# Patient Record
Sex: MALE | Race: WHITE | Employment: FULL TIME | URBAN - METROPOLITAN AREA
[De-identification: names, ages, dates, MRNs, and addresses within clinical notes are randomized per-mention and may not be internally consistent; named-entity substitution may affect disease eponyms.]

---

## 2020-09-26 ENCOUNTER — HOSPITAL ENCOUNTER (INPATIENT)
Age: 20
LOS: 3 days | Discharge: HOME | DRG: 871 | End: 2020-09-29
Attending: INTERNAL MEDICINE | Admitting: STUDENT IN AN ORGANIZED HEALTH CARE EDUCATION/TRAINING PROGRAM

## 2020-09-26 PROBLEM — B95.5 STREPTOCOCCAL BACTEREMIA: Status: ACTIVE | Noted: 2020-09-26

## 2020-09-26 PROBLEM — J02.0 PHARYNGITIS DUE TO STREPTOCOCCUS SPECIES: Status: ACTIVE | Noted: 2020-09-26

## 2020-09-26 PROBLEM — R78.81 BACTEREMIA DUE TO STREPTOCOCCUS PNEUMONIAE: Status: ACTIVE | Noted: 2020-09-26

## 2020-09-26 PROBLEM — R93.89 ABNORMAL CT OF THE CHEST: Status: ACTIVE | Noted: 2020-09-26

## 2020-09-26 PROBLEM — R78.81 STREPTOCOCCAL BACTEREMIA: Status: ACTIVE | Noted: 2020-09-26

## 2020-09-26 PROBLEM — B95.3 BACTEREMIA DUE TO STREPTOCOCCUS PNEUMONIAE: Status: ACTIVE | Noted: 2020-09-26

## 2020-09-26 PROBLEM — E87.6 HYPOKALEMIA: Status: ACTIVE | Noted: 2020-09-26

## 2020-09-26 PROCEDURE — 6360000002 HC RX W HCPCS: Performed by: INTERNAL MEDICINE

## 2020-09-26 PROCEDURE — 6370000000 HC RX 637 (ALT 250 FOR IP): Performed by: INTERNAL MEDICINE

## 2020-09-26 PROCEDURE — 2580000003 HC RX 258: Performed by: INTERNAL MEDICINE

## 2020-09-26 PROCEDURE — 1200000000 HC SEMI PRIVATE

## 2020-09-26 PROCEDURE — 36415 COLL VENOUS BLD VENIPUNCTURE: CPT

## 2020-09-26 PROCEDURE — 99222 1ST HOSP IP/OBS MODERATE 55: CPT | Performed by: INTERNAL MEDICINE

## 2020-09-26 PROCEDURE — 87040 BLOOD CULTURE FOR BACTERIA: CPT

## 2020-09-26 RX ORDER — FLUOXETINE HYDROCHLORIDE 20 MG/1
20 CAPSULE ORAL DAILY
COMMUNITY

## 2020-09-26 RX ORDER — HYDROXYZINE 50 MG/1
50 TABLET, FILM COATED ORAL EVERY 6 HOURS PRN
Status: DISCONTINUED | OUTPATIENT
Start: 2020-09-26 | End: 2020-09-29 | Stop reason: HOSPADM

## 2020-09-26 RX ORDER — OLANZAPINE 5 MG/1
5 TABLET ORAL NIGHTLY
COMMUNITY
End: 2021-01-29 | Stop reason: ALTCHOICE

## 2020-09-26 RX ORDER — SODIUM CHLORIDE 9 MG/ML
INJECTION, SOLUTION INTRAVENOUS CONTINUOUS
Status: DISCONTINUED | OUTPATIENT
Start: 2020-09-26 | End: 2020-09-29 | Stop reason: HOSPADM

## 2020-09-26 RX ORDER — SODIUM CHLORIDE 0.9 % (FLUSH) 0.9 %
10 SYRINGE (ML) INJECTION PRN
Status: DISCONTINUED | OUTPATIENT
Start: 2020-09-26 | End: 2020-09-29 | Stop reason: HOSPADM

## 2020-09-26 RX ORDER — ALBUTEROL SULFATE 2.5 MG/3ML
2.5 SOLUTION RESPIRATORY (INHALATION)
Status: DISCONTINUED | OUTPATIENT
Start: 2020-09-26 | End: 2020-09-29 | Stop reason: HOSPADM

## 2020-09-26 RX ORDER — HYDROXYZINE PAMOATE 50 MG/1
50 CAPSULE ORAL EVERY 6 HOURS PRN
COMMUNITY

## 2020-09-26 RX ORDER — POTASSIUM CHLORIDE 20 MEQ/1
40 TABLET, EXTENDED RELEASE ORAL PRN
Status: DISCONTINUED | OUTPATIENT
Start: 2020-09-26 | End: 2020-09-29 | Stop reason: HOSPADM

## 2020-09-26 RX ORDER — FLUOXETINE HYDROCHLORIDE 20 MG/1
20 CAPSULE ORAL DAILY
Status: DISCONTINUED | OUTPATIENT
Start: 2020-09-26 | End: 2020-09-29 | Stop reason: HOSPADM

## 2020-09-26 RX ORDER — OLANZAPINE 5 MG/1
5 TABLET ORAL NIGHTLY
Status: DISCONTINUED | OUTPATIENT
Start: 2020-09-26 | End: 2020-09-29 | Stop reason: HOSPADM

## 2020-09-26 RX ORDER — NICOTINE 21 MG/24HR
1 PATCH, TRANSDERMAL 24 HOURS TRANSDERMAL DAILY PRN
Status: DISCONTINUED | OUTPATIENT
Start: 2020-09-26 | End: 2020-09-29 | Stop reason: HOSPADM

## 2020-09-26 RX ORDER — PROMETHAZINE HYDROCHLORIDE 12.5 MG/1
12.5 TABLET ORAL EVERY 6 HOURS PRN
Status: DISCONTINUED | OUTPATIENT
Start: 2020-09-26 | End: 2020-09-29 | Stop reason: HOSPADM

## 2020-09-26 RX ORDER — ACETAMINOPHEN 650 MG/1
650 SUPPOSITORY RECTAL EVERY 6 HOURS PRN
Status: DISCONTINUED | OUTPATIENT
Start: 2020-09-26 | End: 2020-09-29 | Stop reason: HOSPADM

## 2020-09-26 RX ORDER — IBUPROFEN 400 MG/1
400 TABLET ORAL EVERY 6 HOURS PRN
COMMUNITY

## 2020-09-26 RX ORDER — IPRATROPIUM BROMIDE AND ALBUTEROL SULFATE 2.5; .5 MG/3ML; MG/3ML
1 SOLUTION RESPIRATORY (INHALATION)
Status: DISCONTINUED | OUTPATIENT
Start: 2020-09-26 | End: 2020-09-27

## 2020-09-26 RX ORDER — POTASSIUM CHLORIDE 7.45 MG/ML
10 INJECTION INTRAVENOUS PRN
Status: DISCONTINUED | OUTPATIENT
Start: 2020-09-26 | End: 2020-09-29 | Stop reason: HOSPADM

## 2020-09-26 RX ORDER — SODIUM CHLORIDE 0.9 % (FLUSH) 0.9 %
10 SYRINGE (ML) INJECTION EVERY 12 HOURS SCHEDULED
Status: DISCONTINUED | OUTPATIENT
Start: 2020-09-26 | End: 2020-09-29 | Stop reason: HOSPADM

## 2020-09-26 RX ORDER — ACETAMINOPHEN 325 MG/1
650 TABLET ORAL EVERY 6 HOURS PRN
Status: DISCONTINUED | OUTPATIENT
Start: 2020-09-26 | End: 2020-09-29 | Stop reason: HOSPADM

## 2020-09-26 RX ORDER — AZITHROMYCIN 250 MG/1
250 TABLET, FILM COATED ORAL DAILY
Status: ON HOLD | COMMUNITY
End: 2020-09-29 | Stop reason: HOSPADM

## 2020-09-26 RX ORDER — ONDANSETRON 2 MG/ML
4 INJECTION INTRAMUSCULAR; INTRAVENOUS EVERY 6 HOURS PRN
Status: DISCONTINUED | OUTPATIENT
Start: 2020-09-26 | End: 2020-09-29 | Stop reason: HOSPADM

## 2020-09-26 RX ORDER — IPRATROPIUM BROMIDE AND ALBUTEROL SULFATE 2.5; .5 MG/3ML; MG/3ML
1 SOLUTION RESPIRATORY (INHALATION) EVERY 4 HOURS PRN
Status: DISCONTINUED | OUTPATIENT
Start: 2020-09-26 | End: 2020-09-29 | Stop reason: HOSPADM

## 2020-09-26 RX ADMIN — OLANZAPINE 5 MG: 5 TABLET, FILM COATED ORAL at 20:15

## 2020-09-26 RX ADMIN — SODIUM CHLORIDE: 9 INJECTION, SOLUTION INTRAVENOUS at 18:30

## 2020-09-26 RX ADMIN — ENOXAPARIN SODIUM 40 MG: 40 INJECTION SUBCUTANEOUS at 20:04

## 2020-09-26 RX ADMIN — GENTAMICIN SULFATE 400 MG: 40 INJECTION, SOLUTION INTRAMUSCULAR; INTRAVENOUS at 20:04

## 2020-09-26 RX ADMIN — CEFTRIAXONE SODIUM 2 G: 2 INJECTION, POWDER, FOR SOLUTION INTRAMUSCULAR; INTRAVENOUS at 23:16

## 2020-09-26 ASSESSMENT — ENCOUNTER SYMPTOMS
BLOOD IN STOOL: 0
ABDOMINAL DISTENTION: 0
SHORTNESS OF BREATH: 0
PHOTOPHOBIA: 0
SORE THROAT: 1
COUGH: 1
VOMITING: 0
DIARRHEA: 1
WHEEZING: 0
NAUSEA: 0

## 2020-09-26 ASSESSMENT — PAIN SCALES - GENERAL
PAINLEVEL_OUTOF10: 0
PAINLEVEL_OUTOF10: 0

## 2020-09-26 NOTE — H&P
[facility]  Office: [address], DO, Shanice Krissy, DO, Nicolasachela Davila, DO, Kayla Singhl Blood, DO, Denisse Brady MD, Keli Billings MD, Darryle So, MD, Alecia Dorantes MD, Carlos Smith MD, Santosh Bettencourt MD, Tacos Goodman MD, Maren Stanley MD, Juan Zambrano MD, Shawn Vanegas, DO, Gio Cason MD, Ally Wilkins MD, Jorge Marquez DO, Dakotah Torres MD,  Lilian Dixon DO, Omar Bueno MD, Shawna Hernandez MD, Bibi Hickey, [facility], [address], CNP, Chip Jett, CNP, Zander Walters, CNS, Patrice Hernandez, CNP, Manish Gomez, CNP, Rene Merino, CNP, Stiven Adams, CNP, Mariano Dhillon, CNP, Iveth Martinez PA-C, Herlinda Barros, DOLLY, Chai Bell, CNP, Francisca Waddell, CNP, Damián Meng, CNP, Tawana Rubinstein, CNP, Basilio Romero, CNP         [facility]   [address] [facility]    HISTORY AND PHYSICAL EXAMINATION            Date:   9/26/2020  Patient name:  Epifanio Key  Date of admission:  9/26/2020  4:14 PM  MRN:   [id number]  Account:  [de-identified]  YOB: 2000  PCP:    No primary care provider on file. Room:   [address] [place], [place] [id number]  Code Status:    No Order    Chief Complaint:     Positive Blood culture    History Obtained From:     patient, electronic medical record    History of Present Illness:      The patient is a 22 y.o. male who is admitted to the hospital for the management of:  Strep viridans positive blood culture    The patient reports his problems began last Saturday  He participated in a fear camp at the [facility]  There had been some yelling and screaming  He developed a sore throat  The patient presented to the [facility] urgent care  He was started on Keflex  His symptoms persisted so he followed up with Annelise Mondragon  He was switched to Zithromax  Blood cultures were drawn and 1 was reportedly positive for Strep viridans  The patient was called back to the hospital  CTA revealed bilateral nodules concerning for septic emboli  ID consultation was obtained  The patient was started on Rocephin and gentamicin    Patient reports that he has been feeling much better  His sore throat has resolved  No sick contacts  Fevers appear to have resolved  No recent dental work  No IV drug abuse    The patient and family requested transfer to [facility]      Past Medical History:     Past Medical History:   Diagnosis Date    Depression     Schizoaffective disorder ([facility] [address]         Past Surgical History:     Past Surgical History:   Procedure Laterality Date    WISDOM TOOTH EXTRACTION      While in high school        Medications Prior to Admission:     Prior to Admission medications    Medication Sig Start Date End Date Taking? Authorizing Provider   FLUoxetine (PROZAC) 20 MG capsule Take 20 mg by mouth daily   Yes Historical Provider, MD   hydrOXYzine (VISTARIL) 50 MG capsule Take 50 mg by mouth every 6 hours as needed for Itching   Yes Historical Provider, MD   azithromycin (ZITHROMAX) 250 MG tablet Take 250 mg by mouth daily   Yes Historical Provider, MD        Allergies:     Patient has no known allergies. Social History:     Tobacco:    reports that he has been smoking. He does not have any smokeless tobacco history on file. Alcohol:      reports previous alcohol use. Drug Use:  has no history on file for drug. Family History:     Family History   Problem Relation Age of Onset    Stroke Mother        Review of Systems:     Positive and Negative as described in HPI. Review of Systems   Constitutional: Positive for fever (Resolving). Negative for activity change, appetite change, chills, diaphoresis and fatigue. HENT: Positive for sore throat (Resolving). Negative for congestion and nosebleeds. Eyes: Negative for photophobia and visual disturbance. Respiratory: Positive for cough (Nonproductive). Negative for shortness of breath and wheezing. Cardiovascular: Negative for chest pain and palpitations.    Gastrointestinal: Positive for diarrhea (Resolving). Negative for abdominal distention, blood in stool, nausea and vomiting. Genitourinary: Negative for flank pain and hematuria. Musculoskeletal: Negative for arthralgias and myalgias. Skin: Negative for rash and wound. Neurological: Negative for dizziness and light-headedness. Physical Exam:   /81   Pulse 95   Temp 98.4 °F (36.9 °C) (Oral)   Resp 16   SpO2 100%   Temp (24hrs), Av.4 °F (36.9 °C), Min:98.4 °F (36.9 °C), Max:98.4 °F (36.9 °C)    No results for input(s): POCGLU in the last 72 hours. No intake or output data in the 24 hours ending 20 1718    Physical Exam  Vitals signs reviewed. Constitutional:       General: He is not in acute distress. Appearance: Normal appearance. He is not diaphoretic. HENT:      Head: Normocephalic. Nose: Nose normal.      Mouth/Throat:      Pharynx: No oropharyngeal exudate or posterior oropharyngeal erythema. Eyes:      General: No scleral icterus. Conjunctiva/sclera: Conjunctivae normal.   Neck:      Musculoskeletal: Neck supple. Trachea: No tracheal deviation. Cardiovascular:      Rate and Rhythm: Normal rate and regular rhythm. Pulmonary:      Effort: Pulmonary effort is normal. No respiratory distress. Breath sounds: Normal breath sounds. No wheezing or rales. Chest:      Chest wall: No tenderness. Abdominal:      General: Bowel sounds are normal. There is no distension. Palpations: Abdomen is soft. Tenderness: There is no abdominal tenderness. Musculoskeletal:         General: No tenderness. Skin:     General: Skin is warm and dry. Neurological:      Mental Status: He is alert and oriented to person, place, and time. Investigations:      Laboratory Testing:  No results found for this or any previous visit (from the past 24 hour(s)). Imaging/Diagnostics:    No results found.     Assessment :      Primary Problem  Principal Problem:    Streptococcal bacteremia: Viridans  Active Problems:    Hypokalemia    Pharyngitis due to Streptococcus species    Abnormal CT of the chest - possible septic emboli   Resolved Problems:    * No resolved hospital problems. *        Plan:     Admit  Antibiotics per Infectious Disease service  Respiratory Therapy and Bronchodilators prn  DVT prophylaxis   May need DANE  Data base in progress   See orders      Consultations:   [facility] TO INFECTIOUS DISEASES     Patient is admitted as inpatient status because of co-morbidities listed above, severity of signs and symptoms as outlined, requirement for current medical therapies and most importantly because of direct risk to patient if care not provided in a hospital setting.     Bereket Lopez DO  9/26/2020  5:18 PM    No PCP on file

## 2020-09-27 ENCOUNTER — APPOINTMENT (OUTPATIENT)
Dept: GENERAL RADIOLOGY | Age: 20
DRG: 871 | End: 2020-09-27
Attending: INTERNAL MEDICINE

## 2020-09-27 PROBLEM — E87.1 HYPONATREMIA: Status: ACTIVE | Noted: 2020-09-27

## 2020-09-27 PROBLEM — R93.89 OPACITY NOTED ON IMAGING STUDY: Status: ACTIVE | Noted: 2020-09-27

## 2020-09-27 LAB
ANION GAP SERPL CALCULATED.3IONS-SCNC: 12 MMOL/L (ref 9–17)
BUN BLDV-MCNC: 12 MG/DL (ref 6–20)
BUN/CREAT BLD: ABNORMAL (ref 9–20)
CALCIUM SERPL-MCNC: 8.8 MG/DL (ref 8.6–10.4)
CHLORIDE BLD-SCNC: 103 MMOL/L (ref 98–107)
CO2: 19 MMOL/L (ref 20–31)
CREAT SERPL-MCNC: 1.09 MG/DL (ref 0.7–1.2)
GENT RANDOM DATE LAST DOSE: NORMAL
GENT RANDOM DOSE AMOUNT: NORMAL
GENT RANDOM DOSE TIME: NORMAL
GENTAMICIN RANDOM: 1.8 UG/ML
GFR AFRICAN AMERICAN: >60 ML/MIN
GFR NON-AFRICAN AMERICAN: >60 ML/MIN
GFR SERPL CREATININE-BSD FRML MDRD: ABNORMAL ML/MIN/{1.73_M2}
GFR SERPL CREATININE-BSD FRML MDRD: ABNORMAL ML/MIN/{1.73_M2}
GLUCOSE BLD-MCNC: 105 MG/DL (ref 70–99)
HCT VFR BLD CALC: 45.5 % (ref 40.7–50.3)
HEMOGLOBIN: 15.1 G/DL (ref 13–17)
MCH RBC QN AUTO: 27.5 PG (ref 25.2–33.5)
MCHC RBC AUTO-ENTMCNC: 33.2 G/DL (ref 28.4–34.8)
MCV RBC AUTO: 82.7 FL (ref 82.6–102.9)
NRBC AUTOMATED: 0 PER 100 WBC
PDW BLD-RTO: 12.3 % (ref 11.8–14.4)
PLATELET # BLD: 195 K/UL (ref 138–453)
PMV BLD AUTO: 10.8 FL (ref 8.1–13.5)
POTASSIUM SERPL-SCNC: 4.4 MMOL/L (ref 3.7–5.3)
RBC # BLD: 5.5 M/UL (ref 4.21–5.77)
SODIUM BLD-SCNC: 134 MMOL/L (ref 135–144)
WBC # BLD: 8 K/UL (ref 4.5–13.5)

## 2020-09-27 PROCEDURE — 71046 X-RAY EXAM CHEST 2 VIEWS: CPT

## 2020-09-27 PROCEDURE — 99232 SBSQ HOSP IP/OBS MODERATE 35: CPT | Performed by: INTERNAL MEDICINE

## 2020-09-27 PROCEDURE — 80170 ASSAY OF GENTAMICIN: CPT

## 2020-09-27 PROCEDURE — 1200000000 HC SEMI PRIVATE

## 2020-09-27 PROCEDURE — 6360000002 HC RX W HCPCS: Performed by: INTERNAL MEDICINE

## 2020-09-27 PROCEDURE — 85027 COMPLETE CBC AUTOMATED: CPT

## 2020-09-27 PROCEDURE — 2580000003 HC RX 258: Performed by: INTERNAL MEDICINE

## 2020-09-27 PROCEDURE — 80048 BASIC METABOLIC PNL TOTAL CA: CPT

## 2020-09-27 PROCEDURE — 6370000000 HC RX 637 (ALT 250 FOR IP): Performed by: INTERNAL MEDICINE

## 2020-09-27 PROCEDURE — 36415 COLL VENOUS BLD VENIPUNCTURE: CPT

## 2020-09-27 RX ORDER — IPRATROPIUM BROMIDE AND ALBUTEROL SULFATE 2.5; .5 MG/3ML; MG/3ML
1 SOLUTION RESPIRATORY (INHALATION) EVERY 4 HOURS PRN
Status: DISCONTINUED | OUTPATIENT
Start: 2020-09-27 | End: 2020-09-29 | Stop reason: HOSPADM

## 2020-09-27 RX ADMIN — OLANZAPINE 5 MG: 5 TABLET, FILM COATED ORAL at 20:21

## 2020-09-27 RX ADMIN — ENOXAPARIN SODIUM 40 MG: 40 INJECTION SUBCUTANEOUS at 08:06

## 2020-09-27 RX ADMIN — FLUOXETINE HYDROCHLORIDE 20 MG: 20 CAPSULE ORAL at 08:06

## 2020-09-27 RX ADMIN — CEFTRIAXONE SODIUM 2 G: 2 INJECTION, POWDER, FOR SOLUTION INTRAMUSCULAR; INTRAVENOUS at 17:41

## 2020-09-27 RX ADMIN — GENTAMICIN SULFATE 400 MG: 40 INJECTION, SOLUTION INTRAMUSCULAR; INTRAVENOUS at 20:18

## 2020-09-27 RX ADMIN — ACETAMINOPHEN 650 MG: 325 TABLET ORAL at 20:18

## 2020-09-27 RX ADMIN — SODIUM CHLORIDE: 9 INJECTION, SOLUTION INTRAVENOUS at 11:42

## 2020-09-27 ASSESSMENT — ENCOUNTER SYMPTOMS
COUGH: 0
SORE THROAT: 0
SHORTNESS OF BREATH: 0
ABDOMINAL DISTENTION: 0
ABDOMINAL PAIN: 0
COLOR CHANGE: 0
TROUBLE SWALLOWING: 0
APNEA: 0
VOMITING: 0
NAUSEA: 0
EYE DISCHARGE: 0

## 2020-09-27 ASSESSMENT — PAIN DESCRIPTION - LOCATION: LOCATION: HEAD

## 2020-09-27 ASSESSMENT — PAIN SCALES - GENERAL: PAINLEVEL_OUTOF10: 2

## 2020-09-27 ASSESSMENT — PAIN DESCRIPTION - PAIN TYPE: TYPE: ACUTE PAIN

## 2020-09-27 NOTE — CONSULTS
[facility] -   Infectious diseases evaluation  admission date 9/26/2020    reason for consultation:   Septic pulm emboli    Impression :   Current:  · Strep bacteremia  · Febrile illness  · Blat pulm  septic emboli    Other:  ·   Discussion / summary of stay / plan of care   · Fever and septic P emboli across the lungs bilat-  · Sore throat but never was pharyngitis on exam  · Illness x 7 PTA  · Echo neg [place]  · DANE ordered  Recommendations   · Keep ceftriaxone and genta/  · DANE pend for today  · COVID neg rapid [place] last 9/22    Infection Control Recommendations   · [place] Precautions    Antimicrobial Stewardship Recommendations   · Simplification of therapy  · Targeted therapy  · Per Kg dosing      Coordination ofOutpatient Care:   · Estimated Length of IV antimicrobials:  · Patient will need Midline / picc Catheter Insertion:   · Patient will need SNF:  · Patient will need outpatient wound care:     History of Present Illness:   Initial history:  Quang Sheffield is a 22y.o.-year-old male admitted to the hospital for management of positive blood culture for strep viridans. He was having some sore throat after yelling and screaming, fever started 9/22 102 max, was given Keflex but did not improve he was switched to Zithromax and blood culture drawn, 1 came back positive for strep viridens - fever contin=ous and throat exam reported as neg.     The patient then was called to the hospital, CT of the chest showed bilateral septic emboli, reviewed films  He was started on ceftriaxone and gentamicin were consulted  In the meantime the sore throat has resolved  No history of drug use      Interval changes  9/28/2020     Summary of relevant labs:  Labs:  WBC 8  Micro:  Blood cultures increased 9/2616 I  [facility]- strep [facility]idans     Imaging:  CT chest Imaging from the other hospital showing septic emboli bilaterally        I have personally reviewed the past medical history, past surgical history, medications, social history, and family history, and I haveupdated the database accordingly.   Past Medical History:     Past Medical History:   Diagnosis Date    Depression     Schizoaffective disorder ([facility] [address]        Past Surgical  History:     Past Surgical History:   Procedure Laterality Date    WISDOM TOOTH EXTRACTION      While in high school       Medications:      gentamicin  400 mg Intravenous Q24H    sodium chloride flush  10 mL Intravenous 2 times per day    enoxaparin  40 mg Subcutaneous Daily    cefTRIAXone (ROCEPHIN) IV  2 g Intravenous Q24H    FLUoxetine  20 mg Oral Daily    OLANZapine  5 mg Oral Nightly    aminoglycoside intermittent dosing (placeholder)   Other RX Placeholder       Social History:     Social History     Socioeconomic History    Marital status: Single     Spouse name: Not on file    Number of children: Not on file    Years of education: Not on file    Highest education level: Not on file   Occupational History    Not on file   Social Needs    Financial resource strain: Not on file    Food insecurity     Worry: Not on file     Inability: Not on file    Transportation needs     Medical: Not on file     Non-medical: Not on file   Tobacco Use    Smoking status: Current Every Day Smoker   Substance and Sexual Activity    Alcohol use: Not Currently    Drug use: Not on file    Sexual activity: Not on file   Lifestyle    Physical activity     Days per week: Not on file     Minutes per session: Not on file    Stress: Not on file   Relationships    Social connections     Talks on phone: Not on file     Gets together: Not on file     Attends Orthodox service: Not on file     Active member of club or organization: Not on file     Attends meetings of clubs or organizations: Not on file     Relationship status: Not on file    Intimate partner violence     Fear of current or ex partner: Not on file Emotionally abused: Not on file     Physically abused: Not on file     Forced sexual activity: Not on file   Other Topics Concern    Not on file   Social History Narrative    Not on file       Family History:     Family History   Problem Relation Age of Onset    Stroke Mother         Allergies:   Patient has no known allergies. Review of Systems:     Review of Systems   Constitutional: Negative for activity change and appetite change. HENT: Positive for sore throat. Negative for congestion. Tinnitus: resolved about 2 days after beginning of the illnes. Eyes: Negative for discharge. Respiratory: Negative for apnea. Cardiovascular: Negative for chest pain. Gastrointestinal: Positive for diarrhea (after AB). Negative for abdominal distention. Endocrine: Negative for heat intolerance. Genitourinary: Negative for dysuria. Musculoskeletal: Negative for arthralgias. Skin: Negative for color change. Allergic/Immunologic: Negative for immunocompromised state. Neurological: Negative for dizziness and weakness. Hematological: Negative for adenopathy. Psychiatric/Behavioral: Negative for agitation. Physical Examination :     Patient Vitals for the past 8 hrs:   BP Temp Temp src Pulse Resp SpO2 Weight   09/28/20 0727 119/75 98.4 °F (36.9 °C) Oral 85 16 94 % --   09/28/20 0645 -- -- -- -- -- -- 198 lb 3.2 oz (89.9 kg)       Physical Exam  Constitutional:       Appearance: Normal appearance. HENT:      Head: Normocephalic and atraumatic. Nose: Nose normal.      Mouth/Throat:      Mouth: Mucous membranes are moist.      Pharynx: Oropharynx is clear. Eyes:      General: No scleral icterus. Conjunctiva/sclera: Conjunctivae normal.      Pupils: Pupils are equal, round, and reactive to light. Neck:      Musculoskeletal: Neck supple. No neck rigidity. Cardiovascular:      Rate and Rhythm: Normal rate and regular rhythm. Heart sounds: Normal heart sounds. No murmur. [phone]

## 2020-09-27 NOTE — PROGRESS NOTES
[facility]    Office: [address], DO, Shanique Garzaer, DO, Farrah Manifold, DO, Gaurav Blanca Blood, DO, Polly Perez MD, Freddy Greene MD, Velvet Fitch MD, Milton Bansal MD, Mark Pack MD, Servando Valdez MD, Chelsi Rowe MD, Celestino Eden MD, Mbradha Hsieh MD, Ronald Og DO, Julia Rodriguez MD, Devon Whatley MD, Daina Danielson DO, Fasial Nelson MD,  Hira Foster DO, Norris Holter, MD, Nicol Avitia MD, Analia Dickerson CNP, [facility] [place], CNP, Jina Gillette CNP, Haresh Christensen, CNS, Arley Howard, CNP, Santi Crawford, CNP, Lauren Grier, CNP, Adair Weaver, CNP, Cristal Berumen, CNP, Pedro Reyes PA-C, Calvin Powers [facility], Pamela Meade, CNP, Lisa Schmidt, CNP, Carey Cox, CNP, Helio Traore, CNP, Yovana Machuca, [address]    Progress Note    9/27/2020    2:54 PM    Name:   Vanessa Lambert  MRN:     [id number]     Acct:      [de-identified]   Room:   [facility] Day:  1  Admit Date:  9/26/2020  4:14 PM    PCP:   No primary care provider on file. Code Status:  Full Code    Subjective:     C/C:   Streptococcal bacteremia  Concern with endocarditis    Interval History Status:  improved  Feels good  No malaise  T-max 99.9    Data Base Updates:  WBC 8.0 k/uL RBC 5.50 m/uL Hemoglobin 15.1     Sodium 134Low     Chest x-ray:  Impression:  Subtle left basilar opacity may represent developing pneumonia. Otherwise   negative chest.     Culture, Blood 1 [[id number]] Collected: 09/26/20 1845 Updated: 09/27/20 0609 Specimen Source: Blood Specimen Description . BLOOD Special Requests LT HAND 10ML Culture NO GROWTH 9 HOURS       Brief History:     The patient is a 22 y.o. male who is admitted to the hospital for the management of:  Strep viridans positive blood culture     The patient reports his problems began last Saturday  He participated in a fear camp [facility] the [facility]  There had been some yelling and screaming  He developed a sore throat  The patient presented to the [address] urgent care  He was started on Keflex  His symptoms persisted so he followed up with Hema Barnes  He was switched to Zithromax  Blood cultures were drawn and 1 was reportedly positive for Strep viridans  The patient was called back to the hospital  CTA revealed bilateral nodules concerning for septic emboli  ID consultation was obtained  The patient was started on Rocephin and gentamicin     Patient reports that he has been feeling much better  His sore throat has resolved  No sick contacts  Fevers appear to have resolved  No recent dental work  No IV drug abuse     The patient and family requested transfer to [facility]    The initial plan included:  Antibiotics per Infectious Disease service  Respiratory Therapy and Bronchodilators prn  DVT prophylaxis   May need DANE    Medications: Allergies:  No Known Allergies    Current Meds:   Scheduled Meds:    gentamicin  400 mg Intravenous Q24H    sodium chloride flush  10 mL Intravenous 2 times per day    enoxaparin  40 mg Subcutaneous Daily    cefTRIAXone (ROCEPHIN) IV  2 g Intravenous Q24H    FLUoxetine  20 mg Oral Daily    OLANZapine  5 mg Oral Nightly    aminoglycoside intermittent dosing (placeholder)   Other RX Placeholder     Continuous Infusions:    sodium chloride 75 mL/hr at 09/27/20 1142     PRN Meds: ipratropium-albuterol, sodium chloride flush, acetaminophen **OR** acetaminophen, magnesium hydroxide, promethazine **OR** ondansetron, nicotine, albuterol, ipratropium-albuterol, hydrOXYzine, potassium chloride **OR** potassium alternative oral replacement **OR** potassium chloride    Data:     Past Medical History:   has a past medical history of Depression and Schizoaffective disorder ([facility] [address] Social History:   reports that he has been smoking. He does not have any smokeless tobacco history on file. He reports previous alcohol use.      Family History:   Family History   Problem Relation Age of Onset    Stroke Mother        Review of Systems:     Review of Systems   Constitutional: Negative for chills and diaphoresis. HENT: Negative for sore throat and trouble swallowing. Respiratory: Negative for cough and shortness of breath. Cardiovascular: Negative for chest pain and palpitations. Gastrointestinal: Negative for abdominal pain, nausea and vomiting. Genitourinary: Negative for flank pain and hematuria. Skin: Negative for rash. Physical Examination:        Physical Exam  Vitals signs reviewed. Constitutional:       General: He is not in acute distress. Appearance: He is not diaphoretic. HENT:      Head: Normocephalic. Nose: Nose normal.   Eyes:      General: No scleral icterus. Conjunctiva/sclera: Conjunctivae normal.   Neck:      Musculoskeletal: Neck supple. Trachea: No tracheal deviation. Cardiovascular:      Rate and Rhythm: Normal rate and regular rhythm. Pulmonary:      Effort: Pulmonary effort is normal. No respiratory distress. Breath sounds: Normal breath sounds. No wheezing or rales. Chest:      Chest wall: No tenderness. Abdominal:      General: Bowel sounds are normal. There is no distension. Palpations: Abdomen is soft. Tenderness: There is no abdominal tenderness. Musculoskeletal:         General: No tenderness. Skin:     General: Skin is warm and dry. Vitals:  /78   Pulse 90   Temp 98.6 °F (37 °C) (Oral)   Resp 18   Ht 5' 7\" (1.702 m) Comment: 5' 7\"  Wt 200 lb 14.4 oz (91.1 kg)   SpO2 94%   BMI 31.47 kg/m²   Temp (24hrs), Av °F (37.2 °C), Min:98.4 °F (36.9 °C), Max:99.9 °F (37.7 °C)    No results for input(s): POCGLU in the last 72 hours. I/O (24Hr):     Intake/Output Summary (Last 24 hours) at 2020 1454  Last data filed at 2020 0650  Gross per 24 hour   Intake 1273.84 ml   Output --   Net 1273.84 ml       Labs:  Hematology:  Recent Labs     20  0609   WBC 8.0 RBC 5.50   HGB 15.1   HCT 45.5   MCV 82.7   MCH 27.5   MCHC 33.2   RDW 12.3      MPV 10.8     Chemistry:  Recent Labs     09/27/20  0609   *   K 4.4      CO2 19*   GLUCOSE 105*   BUN 12   CREATININE 1.09   ANIONGAP 12   LABGLOM >60   GFRAA >60   CALCIUM 8.8   No results for input(s): PROT, LABALBU, LABA1C, I7OAIRA, F8GCXFV, FT4, TSH, AST, ALT, LDH, GGT, ALKPHOS, LABGGT, BILITOT, BILIDIR, AMMONIA, AMYLASE, LIPASE, LACTATE, CHOL, HDL, LDLCHOLESTEROL, CHOLHDLRATIO, TRIG, VLDL, LAB48TB, PHENYTOIN, PHENYF, URICACID, POCGLU in the last 72 hours. ABG:No results found for: POCPH, PHART, PH, POCPCO2, XOF1GJZ, PCO2, POCPO2, PO2ART, PO2, POCHCO3, ZRU4UED, HCO3, NBEA, PBEA, BEART, BE, THGBART, THB, KIQ0DMD, RJAO9ENN, G3QXJRFW, O2SAT, FIO2  Lab Results   Component Value Date/Time    SPECIAL LT HAND 10ML 09/26/2020 06:45 PM     Lab Results   Component Value Date/Time    CULTURE NO GROWTH 9 HOURS 09/26/2020 06:45 PM       Radiology:  Tomas Estrada Chest (2 Vw)    Result Date: 9/27/2020  Subtle left basilar opacity may represent developing pneumonia. Otherwise negative chest.         Assessment:        Principal Problem:    Streptococcal bacteremia: Viridans  Active Problems:    Hypokalemia    Pharyngitis due to Streptococcus species    Abnormal CT of the chest - possible septic emboli     Hyponatremia    Opacity left lower lobe  Resolved Problems:    * No resolved hospital problems.  *      Plan:        Antibiotics per Infectious Disease service  Respiratory Therapy and Bronchodilators prn  DVT prophylaxis   May need DANE  Correct electrolyte abnormalities  Observe for possible left lower lobe pneumonia  Data base in progress   See orders      IP CONSULT TO PHARMACY  IP CONSULT TO INFECTIOUS DISEASES    Shiva Hernandez DO  9/27/2020  2:54 PM

## 2020-09-27 NOTE — PROGRESS NOTES
Pharmacy Aminoglycoside Consult    Aminoglycoside: gentamicin  Day: 2  Current Dosin mg IV q24h    Temp max: 99.9    Estimated Creatinine Clearance: 115 mL/min (based on SCr of 1.09 mg/dL). Estimated CrCl using Ideal Body Weight: 100.23 mL/min (based on IBW 66.1 kg)    Recent Labs     20  0609   CREATININE 1.09       Recent Labs     20  0609   WBC 8.0         Random gentamicin: 1.8    ASSESSMENT/PLAN: continue gentamicin 400 mg IV q24h.      Shavon Lion, PharmD, BCPS  2020  11:51 AM

## 2020-09-27 NOTE — PROGRESS NOTES
Beatrice Novak PPatient Assessment complete. Bacteremia due to Streptococcus pneumoniae [R78.81] . Vitals:    09/26/20 1922   BP: 136/74   Pulse: 94   Resp: 16   Temp: 99.9 °F (37.7 °C)   SpO2: 95%     Assessment    Patient alert and oriented. He currently on room air O2 saturation 97% ,RR18,breathing sounds clear and diminished , patient states that he has history of Asthma. He dose not wear O2 at home or take breathing treatment. No Complication no signs of distress at this time will continue to monitor.   RR 18  Breath Sounds: clear      · Bronchodilator assessment at level  1  · Hyperinflation assessment at level   · Secretion Management assessment at level    ·   · [x]    Bronchodilator Assessment  BRONCHODILATOR ASSESSMENT SCORE  Score 0 1 2 3 4 5   Breath Sounds   []  Patient Baseline [x]  No Wheeze good aeration []  Faint, scattered wheezing, good aeration []  Expiratory Wheezing and or moderately diminished []  Insp/Exp wheeze and/or very diminished []  Insp/Exp and/ or marked distress   Respiratory Rate   []  Patient Baseline [x]  Less than 20 []  Less than 20 []  20-25 []  Greater than 25 []  Greater than 25   Peak flow % of Pred or PB [x]  NA   []  Greater than 90%  []  81-90% []  71-80% []  Less than or equal to 70%  or unable to perform []  Unable due to Respiratory Distress   Dyspnea re []  Patient Baseline [x]  No SOB []  No SOB []  SOB on exertion []  SOB min activity []  At rest/acute   e FEV% Predicted       [x]  NA []  Above 69%  []  Unable []  Above 60-69%  []  Unable []  Above 50-59%  []  Unable []  Above 35-49%  []  Unable []  Less than 35%  []  Unable

## 2020-09-27 NOTE — PLAN OF CARE
Problem: Falls - Risk of:  Goal: Will remain free from falls  Description: Will remain free from falls  9/27/2020 1753 by Urvashi Salmeron RN  Outcome: Ongoing     Problem: Falls - Risk of:  Goal: Absence of physical injury  Description: Absence of physical injury  9/27/2020 1753 by Urvashi Salmeron RN  Outcome: Ongoing     Problem: Daily Care:  Goal: Daily care needs are met  Description: Daily care needs are met  Outcome: Ongoing     Problem: Discharge Planning:  Goal: Patients continuum of care needs are met  Description: Patients continuum of care needs are met  Outcome: Ongoing

## 2020-09-28 ENCOUNTER — APPOINTMENT (OUTPATIENT)
Dept: CARDIAC CATH/INVASIVE PROCEDURES | Age: 20
DRG: 871 | End: 2020-09-28
Attending: INTERNAL MEDICINE

## 2020-09-28 LAB
HIV AG/AB: NONREACTIVE
LV EF: 55 %
LV EF: 58 %
LVEF MODALITY: NORMAL
LVEF MODALITY: NORMAL
SARS-COV-2, RAPID: NOT DETECTED
SARS-COV-2: NORMAL
SARS-COV-2: NORMAL
SOURCE: NORMAL

## 2020-09-28 PROCEDURE — 2580000003 HC RX 258: Performed by: STUDENT IN AN ORGANIZED HEALTH CARE EDUCATION/TRAINING PROGRAM

## 2020-09-28 PROCEDURE — 1200000000 HC SEMI PRIVATE

## 2020-09-28 PROCEDURE — U0002 COVID-19 LAB TEST NON-CDC: HCPCS

## 2020-09-28 PROCEDURE — 6370000000 HC RX 637 (ALT 250 FOR IP): Performed by: STUDENT IN AN ORGANIZED HEALTH CARE EDUCATION/TRAINING PROGRAM

## 2020-09-28 PROCEDURE — 2709999900 HC NON-CHARGEABLE SUPPLY

## 2020-09-28 PROCEDURE — 6360000002 HC RX W HCPCS: Performed by: STUDENT IN AN ORGANIZED HEALTH CARE EDUCATION/TRAINING PROGRAM

## 2020-09-28 PROCEDURE — 99254 IP/OBS CNSLTJ NEW/EST MOD 60: CPT | Performed by: INTERNAL MEDICINE

## 2020-09-28 PROCEDURE — 6360000002 HC RX W HCPCS

## 2020-09-28 PROCEDURE — 99232 SBSQ HOSP IP/OBS MODERATE 35: CPT | Performed by: INTERNAL MEDICINE

## 2020-09-28 PROCEDURE — 36415 COLL VENOUS BLD VENIPUNCTURE: CPT

## 2020-09-28 PROCEDURE — B24BZZ4 ULTRASONOGRAPHY OF HEART WITH AORTA, TRANSESOPHAGEAL: ICD-10-PCS | Performed by: STUDENT IN AN ORGANIZED HEALTH CARE EDUCATION/TRAINING PROGRAM

## 2020-09-28 PROCEDURE — 93312 ECHO TRANSESOPHAGEAL: CPT

## 2020-09-28 PROCEDURE — 87389 HIV-1 AG W/HIV-1&-2 AB AG IA: CPT

## 2020-09-28 PROCEDURE — 93325 DOPPLER ECHO COLOR FLOW MAPG: CPT

## 2020-09-28 RX ORDER — SODIUM CHLORIDE 0.9 % (FLUSH) 0.9 %
10 SYRINGE (ML) INJECTION EVERY 12 HOURS SCHEDULED
Status: DISCONTINUED | OUTPATIENT
Start: 2020-09-28 | End: 2020-09-29 | Stop reason: HOSPADM

## 2020-09-28 RX ORDER — SODIUM CHLORIDE 0.9 % (FLUSH) 0.9 %
10 SYRINGE (ML) INJECTION PRN
Status: DISCONTINUED | OUTPATIENT
Start: 2020-09-28 | End: 2020-09-29 | Stop reason: HOSPADM

## 2020-09-28 RX ADMIN — CEFTRIAXONE SODIUM 2 G: 2 INJECTION, POWDER, FOR SOLUTION INTRAMUSCULAR; INTRAVENOUS at 17:42

## 2020-09-28 RX ADMIN — SODIUM CHLORIDE: 9 INJECTION, SOLUTION INTRAVENOUS at 17:41

## 2020-09-28 RX ADMIN — OLANZAPINE 5 MG: 5 TABLET, FILM COATED ORAL at 21:57

## 2020-09-28 RX ADMIN — FLUOXETINE HYDROCHLORIDE 20 MG: 20 CAPSULE ORAL at 16:28

## 2020-09-28 RX ADMIN — GENTAMICIN SULFATE 400 MG: 40 INJECTION, SOLUTION INTRAMUSCULAR; INTRAVENOUS at 19:49

## 2020-09-28 RX ADMIN — ACETAMINOPHEN 650 MG: 325 TABLET ORAL at 19:49

## 2020-09-28 ASSESSMENT — PAIN SCALES - GENERAL
PAINLEVEL_OUTOF10: 2
PAINLEVEL_OUTOF10: 5

## 2020-09-28 ASSESSMENT — ENCOUNTER SYMPTOMS
NAUSEA: 0
ABDOMINAL PAIN: 0
TROUBLE SWALLOWING: 0
SORE THROAT: 1
SHORTNESS OF BREATH: 0
COUGH: 0
SORE THROAT: 0
VOMITING: 0
DIARRHEA: 1

## 2020-09-28 NOTE — FLOWSHEET NOTE
Assessment: The patient is a 22 y.o. male who is admitted to the hospital for the management of:  Strep viridans positive blood culture. Pt not feeling very well. Intervention:  was rounding on floor.  was bedside support offering prayer and comfort. Outcome: Pt was open to spiritual care. Connection was established. Follow Up: Tricia Aschoff will continue to support as needed during hospital stay.         09/27/20 0742   Encounter Summary   Services provided to: Patient   Referral/Consult From: [address] [place] [address] Family members   Continue Visiting   (9/27/20)   Complexity of Encounter Low   Length of Encounter 15 minutes   Spiritual Assessment Completed Yes   Spiritual/Jew   Type Spiritual support   Assessment Approachable   Intervention Nurtured hope   Outcome Comfort

## 2020-09-28 NOTE — PROCEDURES
[facility]  Transesophageal Echocardiogram       Today's Date:  9/28/2020  Indication:   Rule out endocarditis     Operators:  Primary:   Dr Pari Copeland  (Attending Physician)  Assistant:   Lidia Li MD (Cardiovascular Fellow)      Pre Procedure Conscious Sedation Data:    ASA Class:    [] I [x] II [] III [] IV    Mallampati Class:  [] I [x] II [] III [] IV    Procedure:    Patient seen and examined. History and Physical reviewed. Labs reviewed. After informed consent was obtained with explanation of the risks and benefits, the patient was brought to cardiac cath lab. All sedation was administered by the cardiologist. The oropharynx was pre-anesthesized with viscous lidocaine and cetacaine spray. The ultrasound probe was passed without any difficulty. DANE findings:    LA:  Normal  ASUNCION:  No thrombus  RA:  Normal  RV:   Normal  LV:  Normal  Estimated LVEF:  55%  Aorta:   Mild atheromatous disease arch  Pericardium: No pericardial effusion  Septum:  No intracardiac shunt via color Doppler. Valves:    Mitral Valve: Structurally normal. No regurgitation is identified. Aortic Valve: The aortic valve is trileaflet and opens adequately. No regurgitiation is identified. Tricuspid valve: Structurally normal. No regurgitation is identified. Pulmonary valve: Normal. No significant regurgitation    No valvular vegetations or thrombus identified. Summary:     1. A DANE was performed without complications. 2. LVEF 55 %  3. No thrombus or valvular vegetation identified  4. No intracardiac shunt via color Doppler  5. There were no complications encountered.       Electronically signed by Lidia Li MD on 9/28/2020 at 1:25 PM  Cardiovascular Diseases Fellow  [facility]    I have reviewed the case / procedure with resident / fellow  I have examined the patient personally  Patient agree with treatment plan, correction innotes was made as appropriate, and discussed final arrangement based on  my evaluation and exam.    Risk and benefit of procedure if planned were explained in details. Procedure was performed by me, with all aspect of the procedure being done using standard protocol. Note was modified based on my own assessment and treatment.     Heber Johnson MD  [facility]

## 2020-09-28 NOTE — PROGRESS NOTES
DANE neg for vegetation  elda not solve the problem of the pulm nodules and fever    Asking pulm to eval for diag procedure  Get HIV test    Karyna Florence MD. Infectious Diseases

## 2020-09-28 NOTE — PROGRESS NOTES
reportedly positive for Strep viridans  The patient was called back to the hospital  CTA revealed bilateral nodules concerning for septic emboli  ID consultation was obtained  The patient was started on Rocephin and gentamicin     Patient reports that he has been feeling much better  His sore throat has resolved  No sick contacts  Fevers appear to have resolved  No recent dental work  No IV drug abuse     The patient and family requested transfer to [facility]    The initial plan included:  Antibiotics per Infectious Disease service  Respiratory Therapy and Bronchodilators prn  DVT prophylaxis   May need DANE    Medications: Allergies:  No Known Allergies    Current Meds:   Scheduled Meds:    gentamicin  400 mg Intravenous Q24H    sodium chloride flush  10 mL Intravenous 2 times per day    enoxaparin  40 mg Subcutaneous Daily    cefTRIAXone (ROCEPHIN) IV  2 g Intravenous Q24H    FLUoxetine  20 mg Oral Daily    OLANZapine  5 mg Oral Nightly    aminoglycoside intermittent dosing (placeholder)   Other RX Placeholder     Continuous Infusions:    sodium chloride 75 mL/hr at 09/27/20 1142     PRN Meds: ipratropium-albuterol, sodium chloride flush, acetaminophen **OR** acetaminophen, magnesium hydroxide, promethazine **OR** ondansetron, nicotine, albuterol, ipratropium-albuterol, hydrOXYzine, potassium chloride **OR** potassium alternative oral replacement **OR** potassium chloride    Data:     Past Medical History:   has a past medical history of Depression and Schizoaffective disorder ([address] Social History:   reports that he has been smoking. He does not have any smokeless tobacco history on file. He reports previous alcohol use. Family History:   Family History   Problem Relation Age of Onset    Stroke Mother        Review of Systems:     Review of Systems   Constitutional: Negative for chills and diaphoresis. HENT: Negative for sore throat and trouble swallowing.     Respiratory: Negative for cough and shortness of breath. Cardiovascular: Negative for chest pain and palpitations. Gastrointestinal: Negative for abdominal pain, nausea and vomiting. Genitourinary: Negative for flank pain and hematuria. Skin: Negative for rash. Physical Examination:        Physical Exam  Vitals signs reviewed. Constitutional:       General: He is not in acute distress. Appearance: He is not diaphoretic. HENT:      Head: Normocephalic. Nose: Nose normal.   Eyes:      General: No scleral icterus. Conjunctiva/sclera: Conjunctivae normal.   Neck:      Musculoskeletal: Neck supple. Trachea: No tracheal deviation. Cardiovascular:      Rate and Rhythm: Normal rate and regular rhythm. Pulmonary:      Effort: Pulmonary effort is normal. No respiratory distress. Breath sounds: Normal breath sounds. No wheezing or rales. Chest:      Chest wall: No tenderness. Abdominal:      General: Bowel sounds are normal. There is no distension. Palpations: Abdomen is soft. Tenderness: There is no abdominal tenderness. Musculoskeletal:         General: No tenderness. Skin:     General: Skin is warm and dry. Vitals:  /75   Pulse 85   Temp 98.4 °F (36.9 °C) (Oral)   Resp 16   Ht 5' 7\" (1.702 m) Comment: 5' 7\"  Wt 198 lb 3.2 oz (89.9 kg)   SpO2 94%   BMI 31.04 kg/m²   Temp (24hrs), Av.2 °F (37.3 °C), Min:98.4 °F (36.9 °C), Max:100.6 °F (38.1 °C)    No results for input(s): POCGLU in the last 72 hours. I/O (24Hr):     Intake/Output Summary (Last 24 hours) at 2020 0936  Last data filed at 2020 0645  Gross per 24 hour   Intake 2629.15 ml   Output --   Net 2629.15 ml       Labs:  Hematology:  Recent Labs     20  0609   WBC 8.0   RBC 5.50   HGB 15.1   HCT 45.5   MCV 82.7   MCH 27.5   MCHC 33.2   RDW 12.3      MPV 10.8     Chemistry:  Recent Labs     20  0609   *   K 4.4      CO2 19*   GLUCOSE 105*   BUN 12   CREATININE 1.09   ANIONGAP 12 LABGLOM >60   GFRAA >60   CALCIUM 8.8   No results for input(s): PROT, LABALBU, LABA1C, J5ISOMS, F3KTSFP, FT4, TSH, AST, ALT, LDH, GGT, ALKPHOS, LABGGT, BILITOT, BILIDIR, AMMONIA, AMYLASE, LIPASE, LACTATE, CHOL, HDL, LDLCHOLESTEROL, CHOLHDLRATIO, TRIG, VLDL, HVP93RY, PHENYTOIN, PHENYF, URICACID, POCGLU in the last 72 hours. ABG:No results found for: POCPH, PHART, PH, POCPCO2, BXU8NOR, PCO2, POCPO2, PO2ART, PO2, POCHCO3, GOA8EWD, HCO3, NBEA, PBEA, BEART, BE, THGBART, THB, FUO0KTL, WQQZ1FOV, T1CROLJT, O2SAT, FIO2  Lab Results   Component Value Date/Time    SPECIAL LT HAND 10ML 09/26/2020 06:45 PM     Lab Results   Component Value Date/Time    CULTURE NO GROWTH 2 DAYS 09/26/2020 06:45 PM       Radiology:  Elisabeth Baumann Chest (2 Vw)    Result Date: 9/27/2020  Subtle left basilar opacity may represent developing pneumonia. Otherwise negative chest.         Assessment:        Principal Problem:    Streptococcal bacteremia: Viridans  Active Problems:    Hypokalemia    Pharyngitis due to Streptococcus species    Abnormal CT of the chest - possible septic emboli     Hyponatremia    Opacity left lower lobe  Resolved Problems:    * No resolved hospital problems. *      Plan:        Antibiotics per Infectious Radha [place] Dr. Rc Fletcher  She has recommended moving forward with DANE  Cardiology consulted  Respiratory Therapy and Bronchodilators prn  DVT prophylaxis   Correct electrolyte abnormalities  Observe for possible left lower lobe pneumonia  Follow-up CT chest? deferred to ID. .. Left lower lobe infiltrate?   Data base in progress   See orders      IP CONSULT TO PHARMACY  IP CONSULT TO INFECTIOUS DISEASES  IP CONSULT TO DO Sergo  9/28/2020  9:36 AM

## 2020-09-28 NOTE — PLAN OF CARE
Problem: Falls - Risk of:  Goal: Will remain free from falls  Description: Will remain free from falls  9/28/2020 0416 by Jay Gabriel RN  Outcome: Ongoing  9/27/2020 1753 by Rivas Oliveira RN  Outcome: Ongoing  Goal: Absence of physical injury  Description: Absence of physical injury  9/28/2020 0416 by Jay Gabriel RN  Outcome: Ongoing  9/27/2020 1753 by Rivas Oliveira RN  Outcome: Ongoing     Problem: Daily Care:  Goal: Daily care needs are met  Description: Daily care needs are met  9/28/2020 0416 by Jay Gabriel RN  Outcome: Ongoing  9/27/2020 1753 by Rivas Oliveira RN  Outcome: Ongoing     Problem: Pain:  Goal: Pain level will decrease  Description: Pain level will decrease  Outcome: Ongoing  Goal: Control of acute pain  Description: Control of acute pain  Outcome: Ongoing  Goal: Control of chronic pain  Description: Control of chronic pain  Outcome: Ongoing

## 2020-09-28 NOTE — PROGRESS NOTES
Patient admitted, consent signed and questions answered. Patient ready for procedure. Call light to reach with side rails up 2 of 2. .  No one at bedside with patient. History and physical complete.

## 2020-09-29 ENCOUNTER — APPOINTMENT (OUTPATIENT)
Dept: CT IMAGING | Age: 20
DRG: 871 | End: 2020-09-29
Attending: INTERNAL MEDICINE

## 2020-09-29 VITALS
HEART RATE: 99 BPM | RESPIRATION RATE: 16 BRPM | HEIGHT: 67 IN | OXYGEN SATURATION: 99 % | WEIGHT: 198.2 LBS | DIASTOLIC BLOOD PRESSURE: 86 MMHG | BODY MASS INDEX: 31.11 KG/M2 | SYSTOLIC BLOOD PRESSURE: 140 MMHG | TEMPERATURE: 98.5 F

## 2020-09-29 LAB
ANION GAP SERPL CALCULATED.3IONS-SCNC: 12 MMOL/L (ref 9–17)
BUN BLDV-MCNC: 10 MG/DL (ref 6–20)
BUN/CREAT BLD: ABNORMAL (ref 9–20)
CALCIUM SERPL-MCNC: 9 MG/DL (ref 8.6–10.4)
CHLORIDE BLD-SCNC: 103 MMOL/L (ref 98–107)
CO2: 24 MMOL/L (ref 20–31)
CREAT SERPL-MCNC: 1.03 MG/DL (ref 0.7–1.2)
CULTURE: NORMAL
CULTURE: NORMAL
GFR AFRICAN AMERICAN: >60 ML/MIN
GFR NON-AFRICAN AMERICAN: >60 ML/MIN
GFR SERPL CREATININE-BSD FRML MDRD: ABNORMAL ML/MIN/{1.73_M2}
GFR SERPL CREATININE-BSD FRML MDRD: ABNORMAL ML/MIN/{1.73_M2}
GLUCOSE BLD-MCNC: 112 MG/DL (ref 70–99)
HCT VFR BLD CALC: 45.8 % (ref 40.7–50.3)
HEMOGLOBIN: 14.9 G/DL (ref 13–17)
Lab: NORMAL
Lab: NORMAL
MCH RBC QN AUTO: 27.2 PG (ref 25.2–33.5)
MCHC RBC AUTO-ENTMCNC: 32.5 G/DL (ref 28.4–34.8)
MCV RBC AUTO: 83.6 FL (ref 82.6–102.9)
NRBC AUTOMATED: 0 PER 100 WBC
PDW BLD-RTO: 12.3 % (ref 11.8–14.4)
PLATELET # BLD: 239 K/UL (ref 138–453)
PMV BLD AUTO: 10.4 FL (ref 8.1–13.5)
POTASSIUM SERPL-SCNC: 4.6 MMOL/L (ref 3.7–5.3)
RBC # BLD: 5.48 M/UL (ref 4.21–5.77)
SODIUM BLD-SCNC: 139 MMOL/L (ref 135–144)
SPECIMEN DESCRIPTION: NORMAL
SPECIMEN DESCRIPTION: NORMAL
WBC # BLD: 7 K/UL (ref 4.5–13.5)

## 2020-09-29 PROCEDURE — 6360000002 HC RX W HCPCS: Performed by: STUDENT IN AN ORGANIZED HEALTH CARE EDUCATION/TRAINING PROGRAM

## 2020-09-29 PROCEDURE — 99232 SBSQ HOSP IP/OBS MODERATE 35: CPT | Performed by: STUDENT IN AN ORGANIZED HEALTH CARE EDUCATION/TRAINING PROGRAM

## 2020-09-29 PROCEDURE — 80048 BASIC METABOLIC PNL TOTAL CA: CPT

## 2020-09-29 PROCEDURE — 85027 COMPLETE CBC AUTOMATED: CPT

## 2020-09-29 PROCEDURE — 99232 SBSQ HOSP IP/OBS MODERATE 35: CPT | Performed by: INTERNAL MEDICINE

## 2020-09-29 PROCEDURE — 76937 US GUIDE VASCULAR ACCESS: CPT

## 2020-09-29 PROCEDURE — 6360000004 HC RX CONTRAST MEDICATION: Performed by: STUDENT IN AN ORGANIZED HEALTH CARE EDUCATION/TRAINING PROGRAM

## 2020-09-29 PROCEDURE — 73701 CT LOWER EXTREMITY W/DYE: CPT

## 2020-09-29 PROCEDURE — 2580000003 HC RX 258: Performed by: STUDENT IN AN ORGANIZED HEALTH CARE EDUCATION/TRAINING PROGRAM

## 2020-09-29 PROCEDURE — 6370000000 HC RX 637 (ALT 250 FOR IP): Performed by: STUDENT IN AN ORGANIZED HEALTH CARE EDUCATION/TRAINING PROGRAM

## 2020-09-29 PROCEDURE — 99254 IP/OBS CNSLTJ NEW/EST MOD 60: CPT | Performed by: INTERNAL MEDICINE

## 2020-09-29 PROCEDURE — 36569 INSJ PICC 5 YR+ W/O IMAGING: CPT

## 2020-09-29 PROCEDURE — 87040 BLOOD CULTURE FOR BACTERIA: CPT

## 2020-09-29 RX ORDER — ALBUTEROL SULFATE 90 UG/1
2 AEROSOL, METERED RESPIRATORY (INHALATION) 4 TIMES DAILY PRN
Qty: 1 INHALER | Refills: 0 | Status: SHIPPED | OUTPATIENT
Start: 2020-09-29

## 2020-09-29 RX ADMIN — ENOXAPARIN SODIUM 40 MG: 40 INJECTION SUBCUTANEOUS at 09:29

## 2020-09-29 RX ADMIN — IOPAMIDOL 75 ML: 755 INJECTION, SOLUTION INTRAVENOUS at 16:05

## 2020-09-29 RX ADMIN — FLUOXETINE HYDROCHLORIDE 20 MG: 20 CAPSULE ORAL at 09:28

## 2020-09-29 RX ADMIN — CEFTRIAXONE SODIUM 2 G: 2 INJECTION, POWDER, FOR SOLUTION INTRAMUSCULAR; INTRAVENOUS at 17:35

## 2020-09-29 RX ADMIN — SODIUM CHLORIDE: 9 INJECTION, SOLUTION INTRAVENOUS at 09:29

## 2020-09-29 ASSESSMENT — ENCOUNTER SYMPTOMS
EYE DISCHARGE: 0
ABDOMINAL DISTENTION: 0
DIARRHEA: 1
SORE THROAT: 0
APNEA: 0
COLOR CHANGE: 0

## 2020-09-29 ASSESSMENT — PAIN SCALES - GENERAL
PAINLEVEL_OUTOF10: 0

## 2020-09-29 NOTE — PROGRESS NOTES
Patient discharged by self with all belongings. Discharge paperwork provided and discussed. All questions answered. One peripheral IV removed with no complications and catheter intact. One Left cephalic midline left in place for continuation of home IV antibiotics. Patient educated on care of midline. Patient verbalized understanding. Patient states he will  his albuterol prescription tomorrow when he comes back for his antibiotic infusion. Patient ambulated off unit.

## 2020-09-29 NOTE — DISCHARGE INSTR - COC
Continuity of Care Form    Patient Name: Gina Ahumada   :  2000  MRN:  [id number]    Admit date:  2020  Discharge date:  ***    Code Status Order: Full Code   Advance Directives:   Advance Care Flowsheet Documentation       Date/Time Healthcare Directive Type of Healthcare Directive Copy in [address] Agent's Name Healthcare Agent's Phone Number    20 1653  No, patient does not have an advance directive for healthcare treatment -- -- -- -- --            Admitting Physician:  Jadene Boast, MD  PCP: No primary care provider on file. Discharging Nurse: [facility] Unit/Room#: 0203/4475-11  Discharging Unit Phone Number: ***    Emergency Contact:   Extended Emergency Contact Information  Primary Emergency Contact: [address] [facility], [address] [place] [address] Phone: [phone]  Mobile Phone: [phone]  Relation: Parent  Preferred language: English   needed? No    Past Surgical History:  Past Surgical History:   Procedure Laterality Date    WISDOM TOOTH EXTRACTION      While in high school       Immunization History: There is no immunization history on file for this patient.     Active Problems:  Patient Active Problem List   Diagnosis Code    Streptococcal bacteremia: Viridans R78.81, B95.5    Hypokalemia E87.6    Pharyngitis due to Streptococcus species J02.0    Abnormal CT of the chest - possible septic emboli  R93.89    Hyponatremia E87.1    Opacity left lower lobe R93.89       Isolation/Infection:   Isolation            No Isolation          Patient Infection Status       Infection Onset Added Last Indicated Last Indicated By Review Planned Expiration Resolved Resolved By    None active    Resolved    COVID-19 Rule Out 20 COVID-19 (Ordered)   20 Rule-Out Test Resulted            Nurse Assessment:  Last Vital Signs: BP (!) 140/86 Comment: Nurse Notified  Pulse 99   Temp 98.5 °F (36.9 °C) (Oral)   Resp 16   Ht 5' 7\" (1.702 m) Comment: 5' 7\"  Wt 198 lb 3.2 oz (89.9 kg)   SpO2 99%   BMI 31.04 kg/m²     Last documented pain score (0-10 scale): Pain Level: 0  Last Weight:   Wt Readings from Last 1 Encounters:   09/28/20 198 lb 3.2 oz (89.9 kg)     Mental Status:  {IP PT MENTAL STATUS:20030:::0}    IV Access:  { PRIYANKA IV ACCESS:854927960:::0}    Nursing Mobility/ADLs:  Walking   {CHP DME ADLs:206167462:::0}  Transfer  {CHP DME ADLs:400833438:::0}  Bathing  {CHP DME ADLs:808608814:::0}  Dressing  {CHP DME ADLs:486819332:::0}  Toileting  {CHP DME ADLs:511167213:::0}  Feeding  {CHP DME ADLs:637971379:::0}  Med Admin  {CHP DME ADLs:742089499:::0}  Med Delivery   { PRIYANKA MED Delivery:251964873:::0}    Wound Care Documentation and Therapy:        Elimination:  Continence: Bowel: {YES / IS:95885}  Bladder: {YES / HH:58315}  Urinary Catheter: {Urinary Catheter:053421084:::0}   Colostomy/Ileostomy/Ileal Conduit: {YES / LJ:20578}       Date of Last BM: ***    Intake/Output Summary (Last 24 hours) at 9/29/2020 1658  Last data filed at 9/28/2020 1917  Gross per 24 hour   Intake 900 ml   Output --   Net 900 ml     I/O last 3 completed shifts:   In: 900 [I.V.:900]  Out: -     Safety Concerns:     [address] Fail-Safe Safety Concerns:233696612:::0}    Impairments/Disabilities:      [address] Fail-Safe Impairments/Disabilities:459257194:::0}    Nutrition Therapy:  Current Nutrition Therapy:   [address] Fail-Safe Diet List:971563901:::0}    Routes of Feeding: {CHP DME Other Feedings:317594150:::0}  Liquids: {Slp liquid thickness:87703}  Daily Fluid Restriction: {CHP DME Yes amt example:911441516:::0}  Last Modified Barium Swallow with Video (Video Swallowing Test): {Done Not Done YDTE:010616583:::6}    Treatments at the Time of Hospital Discharge:   Respiratory Treatments: ***  Oxygen Therapy:  {Therapy; copd oxygen:65453:::0}  Ventilator:    {KIRSTIN RANDALL Vent List:544303630:::0}    Rehab Therapies: {THERAPEUTIC INTERVENTION:7842857017}  Weight Bearing Status/Restrictions: {KIRSTIN RANDALL

## 2020-09-29 NOTE — PROGRESS NOTES
[facility]  Office: [address], DO, Bailey Jennifer, DO, Anishjanes Muir, DO, Ericka Diaz Blood, DO, Valiant Sicard, MD, Cristel Kaur MD, Pancho Palacios MD, Lloyd Reynoso MD, Eliceo Rivera MD, Essie Hernandez MD, Kiran Cervantes MD, Leon Guadarrama MD, Juan Zuleta MD, Vlad Berg, DO, Terell Teague MD, Ernestina Guthrie MD, Etta Tirado DO, Lyndsay Desir MD,  Jeromy Carmichael DO, Shreya Kam MD, Sindy Shepherd MD, José Gonzalez, [facility], [facility], [facility], Raine Quevedo, [facility], Toi Warner, CNS, Claire Cesar, CNP, Gabby Chambers, CNP, Taylor Escudero, CNP, Mandeep Ambrose, CNP, Eligio Ortiz, CNP, Gregoria Tam PA-C, Jeromy Bae, [facility], Kehinde Reddy, CNP, Jimi Diana, CNP, Summer Cassidy, CNP, Marleny Dunn, CNP, Juan Jose Serna, [address] [place] [address]    Progress Note    9/29/2020    10:56 AM    Name:   Marilee Cifuentes  MRN:     [id number]     Acct:      [de-identified]   Room:   69/0308-52   Day:  3  Admit Date:  9/26/2020  4:14 PM    PCP:   No primary care provider on file. Code Status:  Full Code    Subjective:     C/C: No chief complaint on file. Sorethroat and fever  Interval History Status: improved. Patient seen and examined at bedside. No acute issues overnight. No fevers reported while on antibiotics. Patient mentions that he may have stepped on a nail approximately 1 week before all of the symptoms started. Brief History:     64-year-old male no significant past medical history was admitted due to strep viridans positive blood culture as well as intermittent fevers. Patient was tried on Keflex that did not resolve, was switched to azithromycin and after 1 day of antibiotics was asked to come into the hospital.  Patient had a CTA at outpatient facility showing bilateral nodules concerning for septic emboli.   Patient was on Rocephin gentamicin and transferred to [facility] for further 72 hours. I/O (24Hr): Intake/Output Summary (Last 24 hours) at 9/29/2020 1056  Last data filed at 9/28/2020 1917  Gross per 24 hour   Intake 900 ml   Output --   Net 900 ml       Labs:  Hematology:  Recent Labs     09/27/20  0609 09/29/20  0537   WBC 8.0 7.0   RBC 5.50 5.48   HGB 15.1 14.9   HCT 45.5 45.8   MCV 82.7 83.6   MCH 27.5 27.2   MCHC 33.2 32.5   RDW 12.3 12.3    239   MPV 10.8 10.4     Chemistry:  Recent Labs     09/27/20  0609 09/29/20  0537   * 139   K 4.4 4.6    103   CO2 19* 24   GLUCOSE 105* 112*   BUN 12 10   CREATININE 1.09 1.03   ANIONGAP 12 12   LABGLOM >60 >60   GFRAA >60 >60   CALCIUM 8.8 9.0   No results for input(s): PROT, LABALBU, LABA1C, J8IZDLL, A4LZYPD, FT4, TSH, AST, ALT, LDH, GGT, ALKPHOS, LABGGT, BILITOT, BILIDIR, AMMONIA, AMYLASE, LIPASE, LACTATE, CHOL, HDL, LDLCHOLESTEROL, CHOLHDLRATIO, TRIG, VLDL, DKZ00SH, PHENYTOIN, PHENYF, URICACID, POCGLU in the last 72 hours. ABG:No results found for: POCPH, PHART, PH, POCPCO2, GXQ3SFU, PCO2, POCPO2, PO2ART, PO2, POCHCO3, BFN2TED, HCO3, NBEA, PBEA, BEART, BE, THGBART, THB, NFQ6YVO, FHRR4QBG, S1ZEBMUR, O2SAT, FIO2  Lab Results   Component Value Date/Time    SPECIAL LT HAND 10ML 09/26/2020 06:45 PM     Lab Results   Component Value Date/Time    CULTURE NO GROWTH 3 DAYS 09/26/2020 06:45 PM       Radiology:  Anali Lee Chest (2 Vw)    Result Date: 9/27/2020  Subtle left basilar opacity may represent developing pneumonia.   Otherwise negative chest.       Physical Examination:        General appearance:  alert, cooperative and no distress  Mental Status:  oriented to person, place and time and normal affect  Lungs:  clear to auscultation bilaterally, normal effort  Heart:  regular rate and rhythm, no murmur  Abdomen:  soft, nontender, nondistended, normal bowel sounds, no masses, hepatomegaly, splenomegaly  Extremities:  no edema, redness, tenderness in the calves, mild fluctuance in right metatarsal.  Skin:  no gross lesions, rashes, induration    Assessment:        Hospital Problems           Last Modified POA    * (Principal) Streptococcal bacteremia: Viridans 9/26/2020 Yes    Hypokalemia 9/26/2020 Yes    Pharyngitis due to Streptococcus species 9/26/2020 Yes    Abnormal CT of the chest - possible septic emboli  9/26/2020 Yes    Hyponatremia 9/27/2020 Yes    Opacity left lower lobe 9/27/2020 Yes                Plan:        1. Strep viridans positive blood culture. Appreciate infectious disease recommendations. Patient underwent DANE and no vegetations were appreciated. HIV was negative. Plan to discuss with pulmonology for possible bronc and BAL for further evaluation of septic emboli. Patient currently on Rocephin 2 g every 24 hours and gentamicin 400 mg every 24 hours. 2. Follow-up CT scanning of right foot for evaluation of osteomyelitis.   3. Will get 2 blood cultures again    Leyla Soto MD  9/29/2020  10:56 AM

## 2020-09-29 NOTE — PROGRESS NOTES
[facility] -   Infectious diseases evaluation  admission date 9/26/2020    reason for consultation:   Septic pulm emboli    Impression :   Current:  · Strep bacteremia  · Febrile illness  · Blat pulm  septic emboli    Other:  ·   Discussion / summary of stay / plan of care   · Fever and septic P emboli across the lungs bilat-  · Sore throat but never was pharyngitis on exam  · Illness x 7 PTA  · Echo neg [place]  · DANE ordered  Recommendations   · Keep ceftriaxone 28 days till 10/26/20  ·  and stop genta  · See me office 4 weeks and repeat CT chest at end of tx  · COVID neg rapid [place] last 9/22  · misline  · Ok for DC    Infection Control Recommendations   · [place] Precautions    Antimicrobial Stewardship Recommendations   · Simplification of therapy  · Targeted therapy  · Per Kg dosing      Coordination ofOutpatient Care:   · Estimated Length of IV antimicrobials:  · Patient will need Midline / picc Catheter Insertion:   · Patient will need SNF:  · Patient will need outpatient wound care:     History of Present Illness:   Initial history:  Ernesto Garrett is a 22y.o.-year-old male admitted to the hospital for management of positive blood culture for strep viridans. He was having some sore throat after yelling and screaming, fever started 9/22 102 max, was given Keflex but did not improve he was switched to Zithromax and blood culture drawn, 1 came back positive for strep viridens - fever contin=ous and throat exam reported as neg.     The patient then was called to the hospital, CT of the chest showed bilateral septic emboli, reviewed films  He was started on ceftriaxone and gentamicin were consulted  In the meantime the sore throat has resolved  No history of drug use      Interval changes  9/29/2020   Feels better - no fever  Exam neg  R heel nail 2 weeks ago, pus from the exit site till 5 days PTA  No not tender and no pain  - no pus - exit wound Days per week: Not on file     Minutes per session: Not on file    Stress: Not on file   Relationships    Social connections     Talks on phone: Not on file     Gets together: Not on file     Attends Denominational service: Not on file     Active member of club or organization: Not on file     Attends meetings of clubs or organizations: Not on file     Relationship status: Not on file    Intimate partner violence     Fear of current or ex partner: Not on file     Emotionally abused: Not on file     Physically abused: Not on file     Forced sexual activity: Not on file   Other Topics Concern    Not on file   Social History Narrative    Not on file       Family History:     Family History   Problem Relation Age of Onset    Stroke Mother         Allergies:   Patient has no known allergies. Review of Systems:     Review of Systems   Constitutional: Negative for activity change and appetite change. HENT: Negative for congestion and sore throat. Tinnitus: resolved about 2 days after beginning of the illnes. Eyes: Negative for discharge. Respiratory: Negative for apnea. Cardiovascular: Negative for chest pain. Gastrointestinal: Positive for diarrhea (after AB). Negative for abdominal distention. Endocrine: Negative for heat intolerance and polyuria. Genitourinary: Negative for dysuria. Musculoskeletal: Negative for arthralgias. Skin: Negative for color change. Allergic/Immunologic: Negative for immunocompromised state. Neurological: Negative for dizziness and weakness. Hematological: Negative for adenopathy. Psychiatric/Behavioral: Negative for agitation. Physical Examination :     Patient Vitals for the past 8 hrs:   BP Temp Temp src Pulse Resp SpO2   09/29/20 1200 130/89 98.4 °F (36.9 °C) Oral 96 16 98 %   09/29/20 0730 123/77 98.5 °F (36.9 °C) Oral 93 16 98 %       Physical Exam  Constitutional:       Appearance: Normal appearance.    HENT:      Head: Normocephalic and results: Thank you for allowing us to participate in the care of this patient. Please call with questions. This note is created with the assistance of a speech recognition program.  While intending to generate adocument that actually reflects the content of the visit, the document can still have some errors including those of syntax and sound a like substitutions which may escape proof reading. It such instances, actual meaningcan be extrapolated by contextual diversion.     Gloria Taylor MD  Office: [phone]  Perfect serve / office [phone]

## 2020-09-29 NOTE — PLAN OF CARE
Problem: Falls - Risk of:  Goal: Will remain free from falls  Description: Will remain free from falls  9/29/2020 1705 by Nannette Lauren RN  Outcome: Ongoing  9/29/2020 0540 by Jose Dacosta RN  Outcome: Met This Shift  Goal: Absence of physical injury  Description: Absence of physical injury  9/29/2020 1705 by Nannette Lauren RN  Outcome: Ongoing  9/29/2020 0540 by Jose Dacosta RN  Outcome: Met This Shift     Problem: Daily Care:  Goal: Daily care needs are met  Description: Daily care needs are met  9/29/2020 1705 by Nannette Lauren RN  Outcome: Ongoing  9/29/2020 0540 by Jose Dacosta RN  Outcome: Met This Shift     Problem: Discharge Planning:  Goal: Patients continuum of care needs are met  Description: Patients continuum of care needs are met  Outcome: Ongoing     Problem: Pain:  Goal: Pain level will decrease  Description: Pain level will decrease  9/29/2020 1705 by Nannette Lauren RN  Outcome: Ongoing  9/29/2020 0540 by Jose Dacosta RN  Outcome: Ongoing  Goal: Control of acute pain  Description: Control of acute pain  9/29/2020 1705 by Nannette Lauren RN  Outcome: Ongoing  9/29/2020 0540 by Jose Dacosta RN  Outcome: Ongoing  Goal: Control of chronic pain  Description: Control of chronic pain  9/29/2020 1705 by Nannette Lauren RN  Outcome: Ongoing  9/29/2020 0540 by Jose Dacosta RN  Outcome: Ongoing

## 2020-09-29 NOTE — CONSULTS
PULMONARY & CRITICAL CARE MEDICINE CONSULT NOTE     Patient:  Aissatou Hubbard  MRN: [id number]  Admit date: 9/26/2020  Primary Care Physician: No primary care provider on file. Consulting Physician: Chidi Calix MD    HISTORY     CHIEF COMPLAINT/REASON FOR CONSULT: Bilateral Pulmonary Nodules, fever    HISTORY OF PRESENT ILLNESS:  The patient is a 22 y.o. male who is admitted to the hospital with positive blood cultures for strep viridans. He had a fever that started on 9/22. Tmax 102. He was prescribed keflex which did not help him. Was started on Zithromax and blood cultures were drawn and one of them came back positive again. The patient was then called to the hospital. CT chest was done which showed bilateral septic emboli. He was started on rocephin and gentamicin and ID was consulted. His throat exam was unremarkable. He had an echo at [facility] which was negative. Cardiology did a DANE yesterday which came out to be negative for vegetations as well. Pulmonology has been consulted for bilateral pulm emboli and fever and considering a need for any procedure. PAST MEDICAL HISTORY:        Diagnosis Date    Depression     Schizoaffective disorder ([facility] [address]      PAST SURGICAL HISTORY:        Procedure Laterality Date    WISDOM TOOTH EXTRACTION      While in high school     FAMILY HISTORY:       Problem Relation Age of Onset    Stroke Mother      SOCIAL HISTORY:   TOBACCO:   reports that he has been smoking. He does not have any smokeless tobacco history on file. ETOH:  reports previous alcohol use. DRUGS: has no history on file for drug. ALLERGIES:    No Known Allergies      HOME MEDICATIONS:  Prior to Admission medications    Medication Sig Start Date End Date Taking?  Authorizing Provider   FLUoxetine (PROZAC) 20 MG capsule Take 20 mg by mouth daily   Yes Historical Provider, MD   hydrOXYzine (VISTARIL) 50 MG capsule Take 50 mg by mouth every 6 hours as needed for Itching   Yes Historical Provider, MD   azithromycin (ZITHROMAX) 250 MG tablet Take 250 mg by mouth daily   Yes Historical Provider, MD   OLANZapine (ZYPREXA) 5 MG tablet Take 5 mg by mouth nightly   Yes Historical Provider, MD   ibuprofen (ADVIL;MOTRIN) 400 MG tablet Take 400 mg by mouth every 6 hours as needed for Pain   Yes Historical Provider, MD     IMMUNIZATIONS:    There is no immunization history on file for this patient.     REVIEW OF SYSTEMS:  General: negative for chills, fatigue or fever  ENT: negative for headaches, nasal congestion, sore throat or visual changes  Allergy and Immunology: negative for postnasal drip or seasonal allergies  Hematological and Lymphatic: negative for bleeding problems, swollen lymph nodes  Respiratory: Negative  Cardiovascular: negative for edema or palpitations  Gastrointestinal: negative for abdominal pain, change in bowel habits or nausea/vomiting  Genito-Urinary: negative for dysuria or urinary frequency/urgency  Musculoskeletal: negative for joint pain or joint swelling  Neurological: negative for numbness/tingling, seizures or weakness  Dermatological: negative for pruritus or rash    PHYSICAL EXAMINATION     VITAL SIGNS:   LAST-  /77   Pulse 93   Temp 98.5 °F (36.9 °C) (Oral)   Resp 16   Ht 5' 7\" (1.702 m) Comment: 5' 7\"  Wt 198 lb 3.2 oz (89.9 kg)   SpO2 98%   BMI 31.04 kg/m²   8-24 HR RANGE-  TEMP Temp  Av.1 °F (37.3 °C)  Min: 98.5 °F (36.9 °C)  Max: 99.9 °F (78.6 °C)   BP Systolic (41NGT), NGF:244 , Min:118 , BWJ:229      Diastolic (72WMG), GZ, Min:68, Max:88     PULSE Pulse  Av.8  Min: 61  Max: 102   RR Resp  Avg: 15  Min: 14  Max: 16   O2 SAT SpO2  Av.3 %  Min: 97 %  Max: 98 %   OXYGEN DELIVERY No data recorded     SYSTEMIC EXAMINATION:    General appearance - well appearing, overweight, comfortable and in no acute distress   Mental status - alert, oriented to person, place, and time   Eyes - pupils equal and reactive, extraocular eye movements intact,  103   CO2 19* 24   BUN 12 10   CREATININE 1.09 1.03   GLUCOSE 105* 112*     Liver Function Test:   No results for input(s): PROT, LABALBU, ALT, AST, GGT, ALKPHOS, BILITOT in the last 72 hours. Amylase/Lipase:  No results for input(s): AMYLASE, LIPASE in the last 72 hours. Coagulation Profile:   No results for input(s): INR, PROTIME, APTT in the last 72 hours. Cardiac Enzymes:  No results for input(s): CKTOTAL, CKMB, CKMBINDEX, TROPONINI in the last 72 hours. Lactic Acid:  No results found for: LACTA  BNP:   No results found for: BNP  D-Dimer:  No results found for: DDIMER  Others:   No results found for: TSH, J4MYPAG, Y0BYWMH, THYROIDAB, FT3, T4FREE  No results found for: CHAU, RHEUMFACTOR, SEDRATE, CRP  No results found for: Cydney Saner  No results found for: IRON, TIBC, FERRITIN  No results found for: SPEP, UPEP  No results found for: PSA, CEA, , HT4021,   Microbiology:    Pathology:    Radiology Reports:  XR CHEST (2 VW)   Final Result   Subtle left basilar opacity may represent developing pneumonia. Otherwise   negative chest.             Pulmonary Function test:    Polysomnogram:    Echocardiogram:   No results found for this or any previous visit. Cardiac Catheterization:   No results found for this or any previous visit. ASSESSMENT AND PLAN     Assessment:    Strep viridans bacteremia  Febrile illness  Bilateral septic emboli  DANE negative for vegetations    Plan:    I personally interviewed/examined the patient; reviewed interval history, interpreted all available radiographic and laboratory data at the time of service. Patient's images and lab work-up reviewed. Patient seen at bedside. No indication for bronchoscopy at this time. Continue antibiotics as per infectious disease. Continue supportive care as per primary team.  Pulmonology will continue to follow.     I updated the patient regarding the current clinical condition, provisional diagnosis and management plan. He verbalized a clear understanding and I addressed his concerns, and answered all questions to the best of my abilities. The patient's mother was on the phone during our encounter as well. It was my pleasure to evaluate Deepa Hall today. We will continue to follow. I would like to thank you for allowing me to participate in the care of this patient. Please feel free to call with any further questions or concerns. Yaona Yoder MD  PGY-3, Department of Internal Medicine  [address] [place] [address]  9/29/2020 9:22 AM    Please note that this chart was generated using voice recognition Dragon dictation software. Although every effort was made to ensure the accuracy of this automated transcription, some errors in transcription may have occurred.

## 2020-09-29 NOTE — DISCHARGE SUMMARY
[facility]  Office: [phone]  Miguelito Blanca DO, Pepe Castorena, DO, Sierra Padilla, DO, Bora Garcia, DO, Shonna Mcfarlane MD, Iris Carbajal MD, Liam Pina MD, Yrn Banks MD, Simona Padilla MD, Lois Alfonso MD, Sergei Molina MD, Diane Cota MD, Juan Jorge MD, Lamonte Cooks, DO, Sarath Portillo MD, Marquis Rico MD, Dee Dee Burgos DO, Alejandra Kam MD,  Francis Costa DO, Lauren Stanton MD, Dell Denis MD, Crawfordclaudia [facility], [facility], CNP, Suzanna Frey, CNP, Farshad Hale, CNS, Oxana Gilbert, CNP, [place] Rivero, CNP, Aurea Sequeira, CNP, Rufus Rodney, CNP, Parisa Lawler, CNP, Pam Farmer PA-C, Nick Kwong, [facility], Jessee Ortiz, CNP, Naif Moran, CNP, Taylor Bueno, CNP, Selvin Lawler, CNP, Reena Sarah, [facility]   [facility]    Discharge Summary     Patient ID: Maria Esther Mcallister  :  2000   MRN: [id number]     ACCOUNT:  [de-identified]   Patient's PCP: No primary care provider on file. Admit Date: 2020   Discharge Date: 2020     Length of Stay: 3  Code Status:  Full Code  Admitting Physician: Alejandra Kam MD  Discharge Physician: Alejandra Kam MD     Active Discharge Diagnoses:     Hospital Problem Lists:  Principal Problem:    Streptococcal bacteremia: Viridans  Active Problems:    Hypokalemia    Pharyngitis due to Streptococcus species    Abnormal CT of the chest - possible septic emboli     Hyponatremia    Opacity left lower lobe  Resolved Problems:    * No resolved hospital problems. *      Admission Condition:  stable     Discharged Condition: stable    Hospital Stay:     Hospital Course:  Maria Esther Mcallister is a 22 y.o. male who was admitted for the management of   Streptococcal bacteremia , presented to ER with Fevers and shortness of breath. 49-year-old male admitted due to strep viridans positive blood culture as well as intermittent fevers.   That was not resolving on Keflex or azithromycin. Patient was at outlying facility had a CT a chest showing bilateral nodules concerning for septic emboli. Patient underwent DANE and no vegetation was appreciated. Patient was discharged on Rocephin IV and to follow-up with infectious disease as outpatient. Significant therapeutic interventions:   TEE9/26/2020:  Summary:     1. A DANE was performed without complications. 2. LVEF 55 %   3. No thrombus or valvular vegetation identified   4. No intracardiac shunt via color Doppler   5. There were no complications encountered. Significant Diagnostic Studies:   Labs / Micro:  CBC:   Lab Results   Component Value Date    WBC 7.0 09/29/2020    RBC 5.48 09/29/2020    HGB 14.9 09/29/2020    HCT 45.8 09/29/2020    MCV 83.6 09/29/2020    MCH 27.2 09/29/2020    MCHC 32.5 09/29/2020    RDW 12.3 09/29/2020     09/29/2020     BMP:    Lab Results   Component Value Date    GLUCOSE 112 09/29/2020     09/29/2020    K 4.6 09/29/2020     09/29/2020    CO2 24 09/29/2020    ANIONGAP 12 09/29/2020    BUN 10 09/29/2020    CREATININE 1.03 09/29/2020    BUNCRER NOT REPORTED 09/29/2020    CALCIUM 9.0 09/29/2020    LABGLOM >60 09/29/2020    GFRAA >60 09/29/2020    GFR      09/29/2020    GFR NOT REPORTED 09/29/2020        Radiology:  Xr Chest (2 Vw)    Result Date: 9/27/2020  Subtle left basilar opacity may represent developing pneumonia. Otherwise negative chest.     Ct Foot Right W Contrast    Result Date: 9/29/2020  1. No CT evidence for osteomyelitis. 2. No acute osseous abnormality identified. 3. Mild subcutaneous edema with no organized fluid collection or subcutaneous gas identified. No evidence for radiopaque foreign body.        Consultations:    Consults:     Final Specialist Recommendations/Findings:   IP CONSULT TO PHARMACY  IP CONSULT TO INFECTIOUS DISEASES  IP CONSULT TO CARDIOLOGY  IP CONSULT TO PULMONOLOGY  IP CONSULT TO HOME CARE NEEDS      The patient was seen and examined on day of discharge and this discharge summary is in conjunction with any daily progress note from day of discharge. Discharge plan:     Disposition: Home    Physician Follow Up:         Schedule an appointment as soon as possible for a visit in 4 weeks  infec diseases- get the CT few days before your visit    Ibrahima Antonio [address] [place] [id number]  [facility] [address], [address] [facility], [address] [place] [address] [phone]    In 1 month  Make an appointmet scheduled after completing CT       Requiring Further Evaluation/Follow Up POST HOSPITALIZATION/Incidental Findings: Patient to be discharged with IV antibiotics. Follow-up with CT scan in approximately 4 weeks. And to schedule an appointment with infectious disease a few days after completion of CT scan. Diet: regular diet    Activity: As tolerated    Instructions to Patient: Please take antibiotics as prescribed. There is a prescription for a CT scan please schedule to have CAT scan done in approximately 4 weeks. And please call to schedule an appointment with infectious disease to have the scheduling after completing your CT scan.     Discharge Medications:      Medication List      START taking these medications    albuterol sulfate  (90 Base) MCG/ACT inhaler  Commonly known as:  Ventolin HFA  Inhale 2 puffs into the lungs 4 times daily as needed for Wheezing     cefTRIAXone  infusion  Commonly known as:  ROCEPHIN  Infuse 2,000 mg intravenously every 24 hours for 27 days Stop after 10/26/20 and pull the line  Compound per protocol        CONTINUE taking these medications    FLUoxetine 20 MG capsule  Commonly known as:  PROZAC     hydrOXYzine 50 MG capsule  Commonly known as:  VISTARIL     ibuprofen 400 MG tablet  Commonly known as:  ADVIL;MOTRIN     OLANZapine 5 MG tablet  Commonly known as:  ZYPREXA        STOP taking these medications    azithromycin 250 MG tablet  Commonly known as: Otoniel Olmos           Where to Get Your Medications      These medications were sent to [facility] [address], [address]  [address], [facility] [address]    Phone:  [phone]   · albuterol sulfate  (90 Base) MCG/ACT inhaler     You can get these medications from any pharmacy    Bring a paper prescription for each of these medications  · cefTRIAXone  infusion         Discharge Procedure Orders   CT CHEST WO CONTRAST   Standing Status: Future Standing Exp. Date: 09/29/21     Order Specific Question Answer Comments   Reason for exam: look for resolution of the se[ptic pulm emboli      CBC With Auto Differential   Standing Status: Standing Number of Occurrences: 4 Standing Exp. Date: 09/29/21     Hepatic Function Panel   Standing Status: Standing Number of Occurrences: 4 Standing Exp. Date: 09/29/21     Creatinine, Serum   Standing Status: Standing Number of Occurrences: 4 Standing Exp. Date: 10/29/20       Time Spent on discharge is  31 mins in patient examination, evaluation, counseling as well as medication reconciliation, prescriptions for required medications, discharge plan and follow up. Electronically signed by   Latasha Spann MD  9/29/2020  5:58 PM      Thank you Dr. Sandip Mayes primary care provider on file. for the opportunity to be involved in this patient's care.

## 2020-09-30 ENCOUNTER — HOSPITAL ENCOUNTER (OUTPATIENT)
Dept: ONCOLOGY | Age: 20
Discharge: HOME | End: 2020-09-30
Attending: INTERNAL MEDICINE | Admitting: INTERNAL MEDICINE

## 2020-09-30 VITALS
TEMPERATURE: 100.6 F | RESPIRATION RATE: 16 BRPM | HEART RATE: 103 BPM | SYSTOLIC BLOOD PRESSURE: 137 MMHG | OXYGEN SATURATION: 99 % | DIASTOLIC BLOOD PRESSURE: 75 MMHG

## 2020-09-30 PROBLEM — R78.81 BACTEREMIA: Status: ACTIVE | Noted: 2020-09-30

## 2020-09-30 LAB — INTERVENTION: NORMAL

## 2020-09-30 PROCEDURE — 6360000002 HC RX W HCPCS: Performed by: INTERNAL MEDICINE

## 2020-09-30 PROCEDURE — 2580000003 HC RX 258: Performed by: INTERNAL MEDICINE

## 2020-09-30 PROCEDURE — 96365 THER/PROPH/DIAG IV INF INIT: CPT

## 2020-09-30 RX ADMIN — CEFTRIAXONE SODIUM 2 G: 2 INJECTION, POWDER, FOR SOLUTION INTRAMUSCULAR; INTRAVENOUS at 18:37

## 2020-10-01 ENCOUNTER — TELEPHONE (OUTPATIENT)
Dept: INFECTIOUS DISEASES | Age: 20
End: 2020-10-01

## 2020-10-01 ENCOUNTER — HOSPITAL ENCOUNTER (OUTPATIENT)
Dept: ONCOLOGY | Age: 20
Discharge: HOME | End: 2020-10-01
Attending: INTERNAL MEDICINE | Admitting: INTERNAL MEDICINE
Payer: COMMERCIAL

## 2020-10-01 VITALS
SYSTOLIC BLOOD PRESSURE: 126 MMHG | DIASTOLIC BLOOD PRESSURE: 80 MMHG | HEART RATE: 98 BPM | RESPIRATION RATE: 16 BRPM | TEMPERATURE: 99.3 F | OXYGEN SATURATION: 97 %

## 2020-10-01 PROCEDURE — 2580000003 HC RX 258: Performed by: INTERNAL MEDICINE

## 2020-10-01 PROCEDURE — 6360000002 HC RX W HCPCS: Performed by: INTERNAL MEDICINE

## 2020-10-01 PROCEDURE — 96365 THER/PROPH/DIAG IV INF INIT: CPT

## 2020-10-01 PROCEDURE — 96367 TX/PROPH/DG ADDL SEQ IV INF: CPT

## 2020-10-01 RX ORDER — GENTAMICIN SULFATE 100 MG/100ML
100 INJECTION, SOLUTION INTRAVENOUS ONCE
Status: CANCELLED
Start: 2020-10-02

## 2020-10-01 RX ORDER — GENTAMICIN SULFATE 100 MG/100ML
100 INJECTION, SOLUTION INTRAVENOUS ONCE
Status: CANCELLED
Start: 2020-10-01

## 2020-10-01 RX ORDER — GENTAMICIN SULFATE 100 MG/100ML
100 INJECTION, SOLUTION INTRAVENOUS ONCE
Status: COMPLETED | OUTPATIENT
Start: 2020-10-01 | End: 2020-10-01

## 2020-10-01 RX ADMIN — GENTAMICIN SULFATE 100 MG: 100 INJECTION, SOLUTION INTRAVENOUS at 18:28

## 2020-10-01 RX ADMIN — CEFTRIAXONE SODIUM 2 G: 2 INJECTION, POWDER, FOR SOLUTION INTRAMUSCULAR; INTRAVENOUS at 19:04

## 2020-10-02 ENCOUNTER — HOSPITAL ENCOUNTER (OUTPATIENT)
Dept: ONCOLOGY | Age: 20
Discharge: HOME | End: 2020-10-02
Attending: INTERNAL MEDICINE | Admitting: INTERNAL MEDICINE
Payer: COMMERCIAL

## 2020-10-02 ENCOUNTER — HOSPITAL ENCOUNTER (OUTPATIENT)
Age: 20
Discharge: HOME | End: 2020-10-02
Payer: COMMERCIAL

## 2020-10-02 VITALS
TEMPERATURE: 99.3 F | RESPIRATION RATE: 16 BRPM | SYSTOLIC BLOOD PRESSURE: 121 MMHG | DIASTOLIC BLOOD PRESSURE: 77 MMHG | OXYGEN SATURATION: 97 % | HEART RATE: 95 BPM

## 2020-10-02 LAB
ABSOLUTE EOS #: 0.13 K/UL (ref 0–0.44)
ABSOLUTE IMMATURE GRANULOCYTE: 0.03 K/UL (ref 0–0.3)
ABSOLUTE LYMPH #: 2.57 K/UL (ref 1.1–3.7)
ABSOLUTE MONO #: 0.85 K/UL (ref 0.1–1.4)
ALBUMIN SERPL-MCNC: 4.3 G/DL (ref 3.5–5.2)
ALBUMIN/GLOBULIN RATIO: 1.2 (ref 1–2.5)
ALP BLD-CCNC: 63 U/L (ref 40–129)
ALT SERPL-CCNC: 57 U/L (ref 5–41)
AST SERPL-CCNC: 28 U/L
BASOPHILS # BLD: 1 % (ref 0–2)
BASOPHILS ABSOLUTE: 0.1 K/UL (ref 0–0.2)
BILIRUB SERPL-MCNC: 0.36 MG/DL (ref 0.3–1.2)
BILIRUBIN DIRECT: 0.11 MG/DL
BILIRUBIN, INDIRECT: 0.25 MG/DL (ref 0–1)
C-REACTIVE PROTEIN: 12.8 MG/L (ref 0–5)
CREAT SERPL-MCNC: 1.05 MG/DL (ref 0.7–1.2)
CULTURE: NORMAL
CULTURE: NORMAL
DIFFERENTIAL TYPE: ABNORMAL
EOSINOPHILS RELATIVE PERCENT: 2 % (ref 1–4)
GENT TROUGH DATE LAST DOSE: NORMAL
GENT TROUGH DOSE AMOUNT: NORMAL
GENT TROUGH DOSE TIME: NORMAL
GENTAMICIN TROUGH: <0.3 UG/ML
GFR AFRICAN AMERICAN: >60 ML/MIN
GFR NON-AFRICAN AMERICAN: >60 ML/MIN
GFR SERPL CREATININE-BSD FRML MDRD: NORMAL ML/MIN/{1.73_M2}
GFR SERPL CREATININE-BSD FRML MDRD: NORMAL ML/MIN/{1.73_M2}
GLOBULIN: ABNORMAL G/DL (ref 1.5–3.8)
HCT VFR BLD CALC: 47.6 % (ref 40.7–50.3)
HEMOGLOBIN: 15.6 G/DL (ref 13–17)
IMMATURE GRANULOCYTES: 0 %
LYMPHOCYTES # BLD: 36 % (ref 25–45)
Lab: NORMAL
Lab: NORMAL
MCH RBC QN AUTO: 27.4 PG (ref 25.2–33.5)
MCHC RBC AUTO-ENTMCNC: 32.8 G/DL (ref 28.4–34.8)
MCV RBC AUTO: 83.5 FL (ref 82.6–102.9)
MONOCYTES # BLD: 12 % (ref 2–8)
NRBC AUTOMATED: 0 PER 100 WBC
PDW BLD-RTO: 12.2 % (ref 11.8–14.4)
PLATELET # BLD: 329 K/UL (ref 138–453)
PLATELET ESTIMATE: ABNORMAL
PMV BLD AUTO: 10.2 FL (ref 8.1–13.5)
RBC # BLD: 5.7 M/UL (ref 4.21–5.77)
RBC # BLD: ABNORMAL 10*6/UL
SEG NEUTROPHILS: 49 % (ref 34–64)
SEGMENTED NEUTROPHILS ABSOLUTE COUNT: 3.56 K/UL (ref 1.5–8.1)
SPECIMEN DESCRIPTION: NORMAL
SPECIMEN DESCRIPTION: NORMAL
TOTAL PROTEIN: 8 G/DL (ref 6.4–8.3)
WBC # BLD: 7.2 K/UL (ref 4.5–13.5)
WBC # BLD: ABNORMAL 10*3/UL

## 2020-10-02 PROCEDURE — 85025 COMPLETE CBC W/AUTO DIFF WBC: CPT

## 2020-10-02 PROCEDURE — 36415 COLL VENOUS BLD VENIPUNCTURE: CPT

## 2020-10-02 PROCEDURE — 96367 TX/PROPH/DG ADDL SEQ IV INF: CPT

## 2020-10-02 PROCEDURE — 6360000002 HC RX W HCPCS: Performed by: INTERNAL MEDICINE

## 2020-10-02 PROCEDURE — 80076 HEPATIC FUNCTION PANEL: CPT

## 2020-10-02 PROCEDURE — 80170 ASSAY OF GENTAMICIN: CPT

## 2020-10-02 PROCEDURE — 96365 THER/PROPH/DIAG IV INF INIT: CPT

## 2020-10-02 PROCEDURE — 86140 C-REACTIVE PROTEIN: CPT

## 2020-10-02 PROCEDURE — 2580000003 HC RX 258: Performed by: INTERNAL MEDICINE

## 2020-10-02 PROCEDURE — 82565 ASSAY OF CREATININE: CPT

## 2020-10-02 PROCEDURE — 87040 BLOOD CULTURE FOR BACTERIA: CPT

## 2020-10-02 RX ORDER — GENTAMICIN SULFATE 100 MG/100ML
100 INJECTION, SOLUTION INTRAVENOUS ONCE
Status: CANCELLED
Start: 2020-10-03

## 2020-10-02 RX ORDER — GENTAMICIN SULFATE 100 MG/100ML
100 INJECTION, SOLUTION INTRAVENOUS ONCE
Status: COMPLETED | OUTPATIENT
Start: 2020-10-02 | End: 2020-10-02

## 2020-10-02 RX ADMIN — GENTAMICIN SULFATE 100 MG: 100 INJECTION, SOLUTION INTRAVENOUS at 18:15

## 2020-10-02 RX ADMIN — CEFTRIAXONE SODIUM 2 G: 2 INJECTION, POWDER, FOR SOLUTION INTRAMUSCULAR; INTRAVENOUS at 17:50

## 2020-10-02 ASSESSMENT — PAIN SCALES - GENERAL: PAINLEVEL_OUTOF10: 0

## 2020-10-04 ENCOUNTER — HOSPITAL ENCOUNTER (OUTPATIENT)
Dept: ONCOLOGY | Age: 20
Discharge: HOME | End: 2020-10-04
Attending: INTERNAL MEDICINE | Admitting: INTERNAL MEDICINE
Payer: COMMERCIAL

## 2020-10-04 VITALS
OXYGEN SATURATION: 100 % | RESPIRATION RATE: 16 BRPM | TEMPERATURE: 98.3 F | DIASTOLIC BLOOD PRESSURE: 86 MMHG | SYSTOLIC BLOOD PRESSURE: 140 MMHG | HEART RATE: 68 BPM

## 2020-10-04 LAB
CREAT SERPL-MCNC: 1.08 MG/DL (ref 0.7–1.2)
GENT TROUGH DATE LAST DOSE: NORMAL
GENT TROUGH DOSE AMOUNT: NORMAL
GENT TROUGH DOSE TIME: NORMAL
GENTAMICIN TROUGH: <0.3 UG/ML
GFR AFRICAN AMERICAN: >60 ML/MIN
GFR NON-AFRICAN AMERICAN: >60 ML/MIN
GFR SERPL CREATININE-BSD FRML MDRD: NORMAL ML/MIN/{1.73_M2}
GFR SERPL CREATININE-BSD FRML MDRD: NORMAL ML/MIN/{1.73_M2}

## 2020-10-04 PROCEDURE — 36415 COLL VENOUS BLD VENIPUNCTURE: CPT

## 2020-10-04 PROCEDURE — 82565 ASSAY OF CREATININE: CPT

## 2020-10-04 PROCEDURE — 96367 TX/PROPH/DG ADDL SEQ IV INF: CPT

## 2020-10-04 PROCEDURE — 6360000002 HC RX W HCPCS: Performed by: INTERNAL MEDICINE

## 2020-10-04 PROCEDURE — 96365 THER/PROPH/DIAG IV INF INIT: CPT

## 2020-10-04 PROCEDURE — 2580000003 HC RX 258: Performed by: INTERNAL MEDICINE

## 2020-10-04 PROCEDURE — 80170 ASSAY OF GENTAMICIN: CPT

## 2020-10-04 RX ORDER — GENTAMICIN SULFATE 100 MG/100ML
100 INJECTION, SOLUTION INTRAVENOUS ONCE
Status: CANCELLED
Start: 2020-10-05

## 2020-10-04 RX ORDER — GENTAMICIN SULFATE 100 MG/100ML
100 INJECTION, SOLUTION INTRAVENOUS ONCE
Status: DISCONTINUED | OUTPATIENT
Start: 2020-10-04 | End: 2020-10-04

## 2020-10-04 RX ORDER — GENTAMICIN IN NACL, ISO-OSM 100MG/0.1L
100 INTRAVENOUS SOLUTION, PIGGYBACK (ML) INTRAVENOUS ONCE
Status: COMPLETED | OUTPATIENT
Start: 2020-10-04 | End: 2020-10-04

## 2020-10-04 RX ADMIN — GENTAMICIN SULFATE 100 MG: 100 INJECTION, SOLUTION INTRAVENOUS at 19:01

## 2020-10-04 RX ADMIN — CEFTRIAXONE SODIUM 2 G: 2 INJECTION, POWDER, FOR SOLUTION INTRAMUSCULAR; INTRAVENOUS at 18:08

## 2020-10-05 ENCOUNTER — HOSPITAL ENCOUNTER (OUTPATIENT)
Dept: ONCOLOGY | Age: 20
Discharge: HOME | End: 2020-10-05
Attending: INTERNAL MEDICINE | Admitting: INTERNAL MEDICINE
Payer: COMMERCIAL

## 2020-10-05 VITALS
OXYGEN SATURATION: 96 % | TEMPERATURE: 99 F | HEART RATE: 89 BPM | SYSTOLIC BLOOD PRESSURE: 129 MMHG | RESPIRATION RATE: 16 BRPM | DIASTOLIC BLOOD PRESSURE: 80 MMHG

## 2020-10-05 LAB
CREAT SERPL-MCNC: 1.05 MG/DL (ref 0.7–1.2)
GENT RANDOM DATE LAST DOSE: NORMAL
GENT RANDOM DOSE AMOUNT: NORMAL
GENT RANDOM DOSE TIME: NORMAL
GENTAMICIN RANDOM: 8.7 UG/ML
GFR AFRICAN AMERICAN: >60 ML/MIN
GFR NON-AFRICAN AMERICAN: >60 ML/MIN
GFR SERPL CREATININE-BSD FRML MDRD: NORMAL ML/MIN/{1.73_M2}
GFR SERPL CREATININE-BSD FRML MDRD: NORMAL ML/MIN/{1.73_M2}

## 2020-10-05 PROCEDURE — 96365 THER/PROPH/DIAG IV INF INIT: CPT

## 2020-10-05 PROCEDURE — 2580000003 HC RX 258: Performed by: INTERNAL MEDICINE

## 2020-10-05 PROCEDURE — 82565 ASSAY OF CREATININE: CPT

## 2020-10-05 PROCEDURE — 36415 COLL VENOUS BLD VENIPUNCTURE: CPT

## 2020-10-05 PROCEDURE — 96367 TX/PROPH/DG ADDL SEQ IV INF: CPT

## 2020-10-05 PROCEDURE — 80170 ASSAY OF GENTAMICIN: CPT

## 2020-10-05 PROCEDURE — 6360000002 HC RX W HCPCS: Performed by: INTERNAL MEDICINE

## 2020-10-05 RX ADMIN — GENTAMICIN SULFATE 220 MG: 40 INJECTION, SOLUTION INTRAMUSCULAR; INTRAVENOUS at 17:44

## 2020-10-05 RX ADMIN — CEFTRIAXONE SODIUM 2 G: 2 INJECTION, POWDER, FOR SOLUTION INTRAMUSCULAR; INTRAVENOUS at 18:26

## 2020-10-05 NOTE — PLAN OF CARE
Patient has transcribed orders for gentamicin and creatinine serum to be drawn every other day. Labs were drawn 10/2 and 10/4 and are due to be drawn 10/6. Pharmacy confirmed that levels should be drawn after gentamicin has been administered. Patient agreed to get labs drawn on 10/6/20.

## 2020-10-06 ENCOUNTER — HOSPITAL ENCOUNTER (OUTPATIENT)
Dept: ONCOLOGY | Age: 20
Discharge: HOME | End: 2020-10-06
Attending: INTERNAL MEDICINE | Admitting: INTERNAL MEDICINE
Payer: COMMERCIAL

## 2020-10-06 LAB
CREAT SERPL-MCNC: 1 MG/DL (ref 0.7–1.2)
GENT TROUGH DATE LAST DOSE: NORMAL
GENT TROUGH DOSE AMOUNT: NORMAL
GENT TROUGH DOSE TIME: NORMAL
GENTAMICIN TROUGH: <0.3 UG/ML
GFR AFRICAN AMERICAN: >60 ML/MIN
GFR NON-AFRICAN AMERICAN: >60 ML/MIN
GFR SERPL CREATININE-BSD FRML MDRD: NORMAL ML/MIN/{1.73_M2}
GFR SERPL CREATININE-BSD FRML MDRD: NORMAL ML/MIN/{1.73_M2}

## 2020-10-06 PROCEDURE — 80170 ASSAY OF GENTAMICIN: CPT

## 2020-10-06 PROCEDURE — 2580000003 HC RX 258: Performed by: INTERNAL MEDICINE

## 2020-10-06 PROCEDURE — 82565 ASSAY OF CREATININE: CPT

## 2020-10-06 PROCEDURE — 96365 THER/PROPH/DIAG IV INF INIT: CPT

## 2020-10-06 PROCEDURE — 6360000002 HC RX W HCPCS: Performed by: INTERNAL MEDICINE

## 2020-10-06 PROCEDURE — 96367 TX/PROPH/DG ADDL SEQ IV INF: CPT

## 2020-10-06 PROCEDURE — 36415 COLL VENOUS BLD VENIPUNCTURE: CPT

## 2020-10-06 RX ADMIN — GENTAMICIN SULFATE 220 MG: 40 INJECTION, SOLUTION INTRAMUSCULAR; INTRAVENOUS at 18:47

## 2020-10-06 RX ADMIN — CEFTRIAXONE SODIUM 2 G: 2 INJECTION, POWDER, FOR SOLUTION INTRAMUSCULAR; INTRAVENOUS at 17:29

## 2020-10-07 ENCOUNTER — HOSPITAL ENCOUNTER (OUTPATIENT)
Dept: ONCOLOGY | Age: 20
Discharge: HOME | DRG: 724 | End: 2020-10-07
Attending: INTERNAL MEDICINE | Admitting: INTERNAL MEDICINE
Payer: COMMERCIAL

## 2020-10-07 VITALS
HEART RATE: 89 BPM | DIASTOLIC BLOOD PRESSURE: 81 MMHG | RESPIRATION RATE: 18 BRPM | SYSTOLIC BLOOD PRESSURE: 131 MMHG | TEMPERATURE: 99.1 F | OXYGEN SATURATION: 100 %

## 2020-10-07 PROCEDURE — 96367 TX/PROPH/DG ADDL SEQ IV INF: CPT

## 2020-10-07 PROCEDURE — 2580000003 HC RX 258: Performed by: INTERNAL MEDICINE

## 2020-10-07 PROCEDURE — 96365 THER/PROPH/DIAG IV INF INIT: CPT

## 2020-10-07 PROCEDURE — 6360000002 HC RX W HCPCS: Performed by: INTERNAL MEDICINE

## 2020-10-07 RX ADMIN — GENTAMICIN SULFATE 220 MG: 40 INJECTION, SOLUTION INTRAMUSCULAR; INTRAVENOUS at 18:08

## 2020-10-07 RX ADMIN — CEFTRIAXONE SODIUM 2 G: 2 INJECTION, POWDER, FOR SOLUTION INTRAMUSCULAR; INTRAVENOUS at 17:19

## 2020-10-08 LAB
CULTURE: NORMAL
CULTURE: NORMAL
Lab: NORMAL
Lab: NORMAL
SPECIMEN DESCRIPTION: NORMAL
SPECIMEN DESCRIPTION: NORMAL

## 2020-10-09 ENCOUNTER — HOSPITAL ENCOUNTER (OUTPATIENT)
Dept: ONCOLOGY | Age: 20
Discharge: HOME | DRG: 724 | End: 2020-10-09
Attending: INTERNAL MEDICINE | Admitting: INTERNAL MEDICINE
Payer: COMMERCIAL

## 2020-10-09 VITALS
SYSTOLIC BLOOD PRESSURE: 143 MMHG | TEMPERATURE: 98.1 F | HEART RATE: 93 BPM | RESPIRATION RATE: 16 BRPM | DIASTOLIC BLOOD PRESSURE: 83 MMHG | OXYGEN SATURATION: 100 %

## 2020-10-09 PROCEDURE — 36569 INSJ PICC 5 YR+ W/O IMAGING: CPT

## 2020-10-09 PROCEDURE — 76937 US GUIDE VASCULAR ACCESS: CPT

## 2020-10-09 PROCEDURE — 96365 THER/PROPH/DIAG IV INF INIT: CPT

## 2020-10-09 PROCEDURE — C1751 CATH, INF, PER/CENT/MIDLINE: HCPCS

## 2020-10-09 PROCEDURE — 6360000002 HC RX W HCPCS: Performed by: INTERNAL MEDICINE

## 2020-10-09 PROCEDURE — 2580000003 HC RX 258: Performed by: INTERNAL MEDICINE

## 2020-10-09 RX ADMIN — CEFTRIAXONE SODIUM 2 G: 2 INJECTION, POWDER, FOR SOLUTION INTRAMUSCULAR; INTRAVENOUS at 14:52

## 2020-10-10 ENCOUNTER — APPOINTMENT (OUTPATIENT)
Dept: CT IMAGING | Age: 20
DRG: 724 | End: 2020-10-10
Attending: INTERNAL MEDICINE
Payer: COMMERCIAL

## 2020-10-10 PROBLEM — R91.8 MULTIPLE LUNG NODULES ON CT: Status: ACTIVE | Noted: 2020-10-10

## 2020-10-10 PROCEDURE — 71250 CT THORAX DX C-: CPT

## 2020-10-12 ENCOUNTER — ANESTHESIA EVENT (OUTPATIENT)
Dept: ENDOSCOPY | Age: 20
DRG: 724 | End: 2020-10-12
Payer: COMMERCIAL

## 2020-10-12 ENCOUNTER — ANESTHESIA (OUTPATIENT)
Dept: ENDOSCOPY | Age: 20
DRG: 724 | End: 2020-10-12
Payer: COMMERCIAL

## 2020-10-12 NOTE — ANESTHESIA PRE PROCEDURE
Department of Anesthesiology  Preprocedure Note       Name:  Manfred Carvalho   Age:  22 y.o.  :  2000                                          MRN:  [id number]         Date:  10/12/2020      Surgeon: Maeve Espino):  Megan Heart MD    Procedure: Procedure(s):  BRONCHOSCOPY    Medications prior to admission:   Prior to Admission medications    Medication Sig Start Date End Date Taking?  Authorizing Provider   cefTRIAXone (ROCEPHIN) infusion Infuse 2,000 mg intravenously every 24 hours for 27 days Stop after 10/26/20 and pull the line  Compound per protocol 9/29/20 10/26/20 Yes Gloria Taylor MD   albuterol sulfate HFA (VENTOLIN HFA) 108 (90 Base) MCG/ACT inhaler Inhale 2 puffs into the lungs 4 times daily as needed for Wheezing 20  Yes Delfino Patterson MD   FLUoxetine (PROZAC) 20 MG capsule Take 20 mg by mouth daily   Yes Historical Provider, MD   hydrOXYzine (VISTARIL) 50 MG capsule Take 50 mg by mouth every 6 hours as needed for Itching   Yes Historical Provider, MD   OLANZapine (ZYPREXA) 5 MG tablet Take 5 mg by mouth nightly   Yes Historical Provider, MD   ibuprofen (ADVIL;MOTRIN) 400 MG tablet Take 400 mg by mouth every 6 hours as needed for Pain   Yes Historical Provider, MD       Current medications:    Current Facility-Administered Medications   Medication Dose Route Frequency Provider Last Rate Last Dose    cefTRIAXone (ROCEPHIN) 2 g IVPB in D5W 50ml minibag  2 g Intravenous Q24H Nikki Hung MD   Stopped at 10/11/20 1619    sodium chloride flush 0.9 % injection 10 mL  10 mL Intravenous 2 times per day Taylor Bolden DO   10 mL at 10/11/20 2042    sodium chloride flush 0.9 % injection 10 mL  10 mL Intravenous PRN Taylor Bolden DO        [Held by provider] enoxaparin (LOVENOX) injection 40 mg  40 mg Subcutaneous Daily Taylor Bolden DO   Stopped at 10/12/20 0900    acetaminophen (TYLENOL) tablet 650 mg  650 mg Oral Q4H PRN Taylor Bolden DO   650 mg at 10/11/20 2041    10/10/20 198 lb (89.8 kg)   09/28/20 198 lb 3.2 oz (89.9 kg)     Body mass index is 31.01 kg/m². CBC:   Lab Results   Component Value Date    WBC 3.8 10/12/2020    RBC 5.21 10/12/2020    HGB 14.2 10/12/2020    HCT 43.4 10/12/2020    MCV 83.3 10/12/2020    RDW 11.9 10/12/2020     10/12/2020       CMP:   Lab Results   Component Value Date     10/12/2020    K 3.9 10/12/2020     10/12/2020    CO2 22 10/12/2020    BUN 8 10/12/2020    CREATININE 0.92 10/12/2020    GFRAA >60 10/12/2020    LABGLOM >60 10/12/2020    GLUCOSE 107 10/12/2020    PROT 8.1 10/10/2020    CALCIUM 8.6 10/12/2020    BILITOT 0.29 10/10/2020    ALKPHOS 61 10/10/2020    AST 25 10/10/2020    ALT 30 10/10/2020       POC Tests: No results for input(s): POCGLU, POCNA, POCK, POCCL, POCBUN, POCHEMO, POCHCT in the last 72 hours.     Coags:   Lab Results   Component Value Date    PROTIME 11.1 10/12/2020    INR 1.1 10/12/2020       HCG (If Applicable): No results found for: PREGTESTUR, PREGSERUM, HCG, HCGQUANT     ABGs: No results found for: PHART, PO2ART, FFU7KTS, LGJ7FHY, BEART, O2WTCSZC     Type & Screen (If Applicable):  No results found for: LABABO, LABRH    Drug/Infectious Status (If Applicable):  No results found for: HIV, HEPCAB    COVID-19 Screening (If Applicable):   Lab Results   Component Value Date    COVID19 Not Detected 10/11/2020         Anesthesia Evaluation  Patient summary reviewed and Nursing notes reviewed no history of anesthetic complications:   Airway: Mallampati: II  TM distance: >3 FB   Neck ROM: full  Mouth opening: > = 3 FB Dental: normal exam         Pulmonary:normal exam  breath sounds clear to auscultation                            ROS comment: Pulmonary nodules  Streptococcal bacteremia Viridans  Former smoker  Opacity Left Lower Lobe  Pharyngitis due to Streptococcus Species  Abnormal CT Chest ?septic emboli   Cardiovascular:  Exercise tolerance: good (>4 METS),   (+) valvular problems/murmurs (Says Pulmonic valve murmur, echo ok): no interval change,       ECG reviewed  Rhythm: regular  Rate: normal  Echocardiogram reviewed         Beta Blocker:  Not on Beta Blocker         Neuro/Psych:   (+) psychiatric history (Schizoaffective disorder): stable with treatmentdepression/anxiety             GI/Hepatic/Renal: Neg GI/Hepatic/Renal ROS            Endo/Other:    (+) electrolyte abnormalities (Hyponatremia), . Abdominal:       Abdomen: soft. Vascular:                                        Anesthesia Plan      MAC     ASA 3       Induction: intravenous. Anesthetic plan and risks discussed with patient. Plan discussed with attending.                   Tami Ortiz, APRN - CRNA   10/12/2020

## 2020-10-14 ENCOUNTER — HOSPITAL ENCOUNTER (OUTPATIENT)
Dept: ONCOLOGY | Age: 20
Discharge: HOME | End: 2020-10-14
Attending: EMERGENCY MEDICINE | Admitting: EMERGENCY MEDICINE
Payer: COMMERCIAL

## 2020-10-14 VITALS — WEIGHT: 198 LBS | BODY MASS INDEX: 31.08 KG/M2 | HEIGHT: 67 IN

## 2020-10-14 PROCEDURE — 96365 THER/PROPH/DIAG IV INF INIT: CPT

## 2020-10-14 PROCEDURE — 96366 THER/PROPH/DIAG IV INF ADDON: CPT

## 2020-10-14 PROCEDURE — 2580000003 HC RX 258: Performed by: EMERGENCY MEDICINE

## 2020-10-14 PROCEDURE — 96367 TX/PROPH/DG ADDL SEQ IV INF: CPT

## 2020-10-14 PROCEDURE — 6360000002 HC RX W HCPCS: Performed by: EMERGENCY MEDICINE

## 2020-10-14 PROCEDURE — 2500000003 HC RX 250 WO HCPCS: Performed by: EMERGENCY MEDICINE

## 2020-10-14 RX ADMIN — Medication 1750 MG: at 15:19

## 2020-10-14 RX ADMIN — CEFEPIME HYDROCHLORIDE 2 G: 2 INJECTION, POWDER, FOR SOLUTION INTRAVENOUS at 14:29

## 2020-10-14 NOTE — CARE COORDINATION
1440: informed by Dr Mamadou Hernandez that this patient needs to have IV ATB's arranged for home. Verified patient's phone number and address. Verified with patient that he is agreeable for home infusions    1440: call placed to [facility]) [phone], she will relay the message to Britney (Enrico). Faxed: [phone] external referral for IDconsult to [facility] for 2nd opinion. Physician 1st spoke with someone at the [facility] access line. 1615: call placed to [facility]) [phone], she states she will require scripts for the ATB's,     Patient will also require a PIC line for Vanco infusion >7 days. Donna states to continue with outpatient treatment, and every attempt will be made to precert medications through [employer], and arrange nursing services. The above was discussed/agreed  with Dr Mamadou Hernandez. The above was discussed with the patient, and he is agreeable. Patient  questions that on weekends he works 7pm-12midnight, questioning the timing of the infusions.

## 2020-10-14 NOTE — DISCHARGE INSTR - COC
Continuity of Care Form    Patient Name: Ernesto Garrett   :  2000  MRN:  [id number]    Admit date:  10/14/2020  Discharge date:  ***    Code Status Order: Prior   Advance Directives:      Admitting Physician:  Maliha Murphy MD  PCP: Anson Fox    Discharging Nurse: [facility] Unit/Room#: 9161/3070-96  Discharging Unit Phone Number: ***    Emergency Contact:   Extended Emergency Contact Information  Primary Emergency Contact: Annette Hayes  Home Phone: [phone]  Mobile Phone: [phone]  Relation: Other  Secondary Emergency Contact: [address], [address] [place] [address] Street Phone: [phone]  Mobile Phone: [phone]  Relation: Parent  Preferred language: English   needed? No    Past Surgical History:  Past Surgical History:   Procedure Laterality Date    WISDOM TOOTH EXTRACTION      While in high school       Immunization History: There is no immunization history on file for this patient.     Active Problems:  Patient Active Problem List   Diagnosis Code    Streptococcal bacteremia: Viridans R78.81, B95.5    Hypokalemia E87.6    Pharyngitis due to Streptococcus species J02.0    Abnormal CT of the chest - possible septic emboli  R93.89    Hyponatremia E87.1    Opacity left lower lobe R93.89    Bacteremia R78.81    Multiple lung nodules on CT R91.8       Isolation/Infection:   Isolation            No Isolation          Patient Infection Status       Infection Onset Added Last Indicated Last Indicated By Review Planned Expiration Resolved Resolved By    None active    Resolved    COVID-19 Rule Out 10/11/20 10/11/20 10/11/20 COVID-19 (Ordered)   10/11/20 Rule-Out Test Resulted    COVID-19 Rule Out 20 COVID-19 (Ordered)   20 Rule-Out Test Resulted            Nurse Assessment:  Last Vital Signs: Ht 5' 7\" (1.702 m)   Wt 198 lb (89.8 kg)   BMI 31.01 kg/m²     Last documented pain score (0-10 scale):    Last Weight:   Wt Readings from Last 1 Encounters:

## 2020-10-15 ENCOUNTER — HOSPITAL ENCOUNTER (OUTPATIENT)
Dept: ONCOLOGY | Age: 20
Discharge: HOME | End: 2020-10-15
Attending: EMERGENCY MEDICINE | Admitting: EMERGENCY MEDICINE
Payer: COMMERCIAL

## 2020-10-15 VITALS — BODY MASS INDEX: 31.01 KG/M2 | WEIGHT: 198 LBS

## 2020-10-15 PROCEDURE — 96367 TX/PROPH/DG ADDL SEQ IV INF: CPT

## 2020-10-15 PROCEDURE — 36569 INSJ PICC 5 YR+ W/O IMAGING: CPT

## 2020-10-15 PROCEDURE — 6360000002 HC RX W HCPCS: Performed by: EMERGENCY MEDICINE

## 2020-10-15 PROCEDURE — 96365 THER/PROPH/DIAG IV INF INIT: CPT

## 2020-10-15 PROCEDURE — 76937 US GUIDE VASCULAR ACCESS: CPT

## 2020-10-15 PROCEDURE — C1751 CATH, INF, PER/CENT/MIDLINE: HCPCS

## 2020-10-15 PROCEDURE — 96366 THER/PROPH/DIAG IV INF ADDON: CPT

## 2020-10-15 PROCEDURE — 2500000003 HC RX 250 WO HCPCS: Performed by: EMERGENCY MEDICINE

## 2020-10-15 PROCEDURE — 2580000003 HC RX 258: Performed by: EMERGENCY MEDICINE

## 2020-10-15 RX ORDER — SODIUM CHLORIDE 0.9 % (FLUSH) 0.9 %
10 SYRINGE (ML) INJECTION EVERY 12 HOURS SCHEDULED
Status: DISCONTINUED | OUTPATIENT
Start: 2020-10-15 | End: 2020-10-15 | Stop reason: HOSPADM

## 2020-10-15 RX ORDER — SODIUM CHLORIDE 0.9 % (FLUSH) 0.9 %
10 SYRINGE (ML) INJECTION PRN
Status: DISCONTINUED | OUTPATIENT
Start: 2020-10-15 | End: 2020-10-15 | Stop reason: HOSPADM

## 2020-10-15 RX ADMIN — CEFEPIME HYDROCHLORIDE 2 G: 2 INJECTION, POWDER, FOR SOLUTION INTRAVENOUS at 15:42

## 2020-10-15 RX ADMIN — Medication 1750 MG: at 16:35

## 2020-10-15 NOTE — PROCEDURES
History/labs/allergies reviewed  Placed by Cate RN  Assisted by Nelly Phelps RN  Consent signed and obtained by physician  Time out performed using two identifiers  Catheter type double  lumen picc  Product type solo2 w 3cg  Lot # [id number]  Expiration date 8/31/2021  Catheter size 5  Romansh  Trimmed at 36  Total length 37  External catheter length 1 cm  Location RBV  Number of attempts 1  Estimated blood loss 2 ml  Pre procedure cardiac Rhythm NS per bedside telemetry and/or 3CG Tracing. Placement verified by- CXR and/or 3cg Max P wave noted by amplitude changes of the P wave, positive blood return, flushes easily  Special equipment used- ultrasound, micro-introducer technique and 3cg technology if indicated  Catheter secured with statlock  Dressing applied- Tegaderm CHG  Lidocaine administered intradermally conc. 1% 2 mL  PICC line education:   [x  ] Discussed with patient/Family or POA prior to signing Informed Procedural Consent. Risks and Benefits along with reason for procedure were discussed and teaching was reinforced with an education handout on PICC insertion. [facility] FAQ Catheter Associated Blood Stream Infections and [address] REV. 7/13 Nursing and Bard Booklet left at bedside or in chart. Patient (Family or POA) acknowledged understanding of information taught and agreed to procedure. [  ] Was not discussed with patient/family or POA due to pts medical status at time of procedure. pts family or POA not available to discuss PICC education.  [facility] FAQ Catheter Associated Blood Stream Infections and [address] REV. 7/13 Nursing and Bard Booklet left at bedside or in chart

## 2020-10-15 NOTE — CARE COORDINATION
Spoke to Jasmin regarding infusions at home. Obtained order from Dr Nani Guillory. Faxed to [place]. Benefits ran. Difficulty getting home care agency to start services. Declines from [place] and [address] per carly due to short staffed. [address] home care can start services on Saturday. Patient will nee dto come to infusion center Friday 10/16 then first doses at home 10/16. Times need to be worked out. [address] would like to do 8a-8p per Jasmin. Spoke to patient and advised of q12h dosing and need to be available. Pt states works 7pm-12 midnight but that Mom will be available on Sunday to learn how to do so that they can do when able. Carly advised of above.     PICC line was placed today

## 2020-10-15 NOTE — PROGRESS NOTES
Pt. Discharged. Pt. Educated on new line care. Pt. Left unit with all personal belongings, ambulatory, and in stable condition.

## 2020-10-19 ENCOUNTER — HOSPITAL ENCOUNTER (OUTPATIENT)
Age: 20
Setting detail: SPECIMEN
Discharge: HOME | End: 2020-10-19
Payer: COMMERCIAL

## 2020-10-19 LAB
BUN BLDV-MCNC: 10 MG/DL (ref 6–20)
CREAT SERPL-MCNC: 0.86 MG/DL (ref 0.7–1.2)
GFR AFRICAN AMERICAN: >60 ML/MIN
GFR NON-AFRICAN AMERICAN: >60 ML/MIN
GFR SERPL CREATININE-BSD FRML MDRD: NORMAL ML/MIN/{1.73_M2}
GFR SERPL CREATININE-BSD FRML MDRD: NORMAL ML/MIN/{1.73_M2}
VANCOMYCIN TROUGH DATE LAST DOSE: NORMAL
VANCOMYCIN TROUGH DOSE AMOUNT: 1500
VANCOMYCIN TROUGH TIME LAST DOSE: 830
VANCOMYCIN TROUGH: 11.4 UG/ML (ref 10–20)

## 2020-10-19 PROCEDURE — 84520 ASSAY OF UREA NITROGEN: CPT

## 2020-10-19 PROCEDURE — 82565 ASSAY OF CREATININE: CPT

## 2020-10-19 PROCEDURE — 80202 ASSAY OF VANCOMYCIN: CPT

## 2020-10-21 ENCOUNTER — TELEPHONE (OUTPATIENT)
Dept: INFECTIOUS DISEASES | Age: 20
End: 2020-10-21

## 2020-10-22 ENCOUNTER — HOSPITAL ENCOUNTER (OUTPATIENT)
Age: 20
Setting detail: SPECIMEN
Discharge: HOME | End: 2020-10-22
Payer: COMMERCIAL

## 2020-10-22 LAB
ABSOLUTE EOS #: 0.2 K/UL (ref 0–0.4)
ABSOLUTE IMMATURE GRANULOCYTE: ABNORMAL K/UL (ref 0–0.3)
ABSOLUTE LYMPH #: 1.52 K/UL (ref 1–4.8)
ABSOLUTE MONO #: 0.39 K/UL (ref 0.1–1.3)
ANION GAP SERPL CALCULATED.3IONS-SCNC: 16 MMOL/L (ref 9–17)
BASOPHILS # BLD: 2 % (ref 0–2)
BASOPHILS ABSOLUTE: 0.08 K/UL (ref 0–0.2)
BUN BLDV-MCNC: 9 MG/DL (ref 6–20)
BUN/CREAT BLD: NORMAL (ref 9–20)
C-REACTIVE PROTEIN: 12.6 MG/L (ref 0–5)
CALCIUM SERPL-MCNC: 9.2 MG/DL (ref 8.6–10.4)
CHLORIDE BLD-SCNC: 102 MMOL/L (ref 98–107)
CO2: 22 MMOL/L (ref 20–31)
CREAT SERPL-MCNC: 0.89 MG/DL (ref 0.7–1.2)
DIFFERENTIAL TYPE: ABNORMAL
EOSINOPHILS RELATIVE PERCENT: 5 % (ref 0–4)
GFR AFRICAN AMERICAN: >60 ML/MIN
GFR NON-AFRICAN AMERICAN: >60 ML/MIN
GFR SERPL CREATININE-BSD FRML MDRD: NORMAL ML/MIN/{1.73_M2}
GFR SERPL CREATININE-BSD FRML MDRD: NORMAL ML/MIN/{1.73_M2}
GLUCOSE BLD-MCNC: 97 MG/DL (ref 70–99)
HCT VFR BLD CALC: 41.8 % (ref 41–53)
HEMOGLOBIN: 14.1 G/DL (ref 13.5–17.5)
IMMATURE GRANULOCYTES: ABNORMAL %
LYMPHOCYTES # BLD: 39 % (ref 24–44)
MCH RBC QN AUTO: 27.2 PG (ref 26–34)
MCHC RBC AUTO-ENTMCNC: 33.7 G/DL (ref 31–37)
MCV RBC AUTO: 80.9 FL (ref 80–100)
MONOCYTES # BLD: 10 % (ref 1–7)
MORPHOLOGY: ABNORMAL
NRBC AUTOMATED: ABNORMAL PER 100 WBC
PDW BLD-RTO: 13 % (ref 11.5–14.9)
PLATELET # BLD: 197 K/UL (ref 150–450)
PLATELET ESTIMATE: ABNORMAL
PMV BLD AUTO: 9.1 FL (ref 6–12)
POTASSIUM SERPL-SCNC: 4 MMOL/L (ref 3.7–5.3)
RBC # BLD: 5.16 M/UL (ref 4.5–5.9)
RBC # BLD: ABNORMAL 10*6/UL
SEG NEUTROPHILS: 44 % (ref 36–66)
SEGMENTED NEUTROPHILS ABSOLUTE COUNT: 1.71 K/UL (ref 1.3–9.1)
SODIUM BLD-SCNC: 140 MMOL/L (ref 135–144)
VANCOMYCIN TROUGH DATE LAST DOSE: ABNORMAL
VANCOMYCIN TROUGH DOSE AMOUNT: ABNORMAL
VANCOMYCIN TROUGH TIME LAST DOSE: 2300
VANCOMYCIN TROUGH: 9.9 UG/ML (ref 10–20)
WBC # BLD: 3.9 K/UL (ref 3.5–11)
WBC # BLD: ABNORMAL 10*3/UL

## 2020-10-22 PROCEDURE — 85025 COMPLETE CBC W/AUTO DIFF WBC: CPT

## 2020-10-22 PROCEDURE — 80202 ASSAY OF VANCOMYCIN: CPT

## 2020-10-22 PROCEDURE — 86140 C-REACTIVE PROTEIN: CPT

## 2020-10-22 PROCEDURE — 80048 BASIC METABOLIC PNL TOTAL CA: CPT

## 2020-10-23 NOTE — TELEPHONE ENCOUNTER
Called Jw to inform. He provided me with Enrico Ridley. S cell number to give orders. lmom [phone]     Asked Keyana to have pt call office to schedule appt and to see if [facility] appt has been scheduled.   Gave order for blood cult x 2

## 2020-10-26 ENCOUNTER — HOSPITAL ENCOUNTER (OUTPATIENT)
Age: 20
Setting detail: SPECIMEN
Discharge: HOME | End: 2020-10-26
Payer: COMMERCIAL

## 2020-10-26 LAB
ANION GAP SERPL CALCULATED.3IONS-SCNC: 12 MMOL/L (ref 9–17)
BUN BLDV-MCNC: 10 MG/DL (ref 6–20)
BUN/CREAT BLD: NORMAL (ref 9–20)
CALCIUM SERPL-MCNC: 9.5 MG/DL (ref 8.6–10.4)
CHLORIDE BLD-SCNC: 104 MMOL/L (ref 98–107)
CO2: 25 MMOL/L (ref 20–31)
CREAT SERPL-MCNC: 0.98 MG/DL (ref 0.7–1.2)
GFR AFRICAN AMERICAN: >60 ML/MIN
GFR NON-AFRICAN AMERICAN: >60 ML/MIN
GFR SERPL CREATININE-BSD FRML MDRD: NORMAL ML/MIN/{1.73_M2}
GFR SERPL CREATININE-BSD FRML MDRD: NORMAL ML/MIN/{1.73_M2}
GLUCOSE BLD-MCNC: 99 MG/DL (ref 70–99)
POTASSIUM SERPL-SCNC: 4.3 MMOL/L (ref 3.7–5.3)
SODIUM BLD-SCNC: 141 MMOL/L (ref 135–144)
VANCOMYCIN TROUGH DATE LAST DOSE: NORMAL
VANCOMYCIN TROUGH DOSE AMOUNT: NORMAL
VANCOMYCIN TROUGH TIME LAST DOSE: 2200
VANCOMYCIN TROUGH: 10.3 UG/ML (ref 10–20)

## 2020-10-26 PROCEDURE — 80202 ASSAY OF VANCOMYCIN: CPT

## 2020-10-26 PROCEDURE — 80048 BASIC METABOLIC PNL TOTAL CA: CPT

## 2020-10-26 NOTE — TELEPHONE ENCOUNTER
Pt called office to sched. appt- he states [facility] needs records prior to appt. Informed him they have access to [facility] records. Pt home care RN asking he go to [address] [place] [address] for blood cult. Placed order in [address] [place] [address] Rd.

## 2020-10-27 ENCOUNTER — HOSPITAL ENCOUNTER (OUTPATIENT)
Dept: ONCOLOGY | Age: 20
Discharge: HOME | End: 2020-10-27
Attending: INTERNAL MEDICINE

## 2020-10-29 ENCOUNTER — HOSPITAL ENCOUNTER (OUTPATIENT)
Age: 20
Setting detail: SPECIMEN
Discharge: HOME | End: 2020-10-29
Payer: COMMERCIAL

## 2020-10-29 LAB
ANION GAP SERPL CALCULATED.3IONS-SCNC: 10 MMOL/L (ref 9–17)
BUN BLDV-MCNC: 10 MG/DL (ref 6–20)
BUN/CREAT BLD: ABNORMAL (ref 9–20)
C-REACTIVE PROTEIN: 10.1 MG/L (ref 0–5)
CALCIUM SERPL-MCNC: 9 MG/DL (ref 8.6–10.4)
CHLORIDE BLD-SCNC: 103 MMOL/L (ref 98–107)
CO2: 24 MMOL/L (ref 20–31)
CREAT SERPL-MCNC: 0.98 MG/DL (ref 0.7–1.2)
GFR AFRICAN AMERICAN: >60 ML/MIN
GFR NON-AFRICAN AMERICAN: >60 ML/MIN
GFR SERPL CREATININE-BSD FRML MDRD: ABNORMAL ML/MIN/{1.73_M2}
GFR SERPL CREATININE-BSD FRML MDRD: ABNORMAL ML/MIN/{1.73_M2}
GLUCOSE BLD-MCNC: 126 MG/DL (ref 70–99)
HCT VFR BLD CALC: 39.5 % (ref 41–53)
HEMOGLOBIN: 13.8 G/DL (ref 13.5–17.5)
MCH RBC QN AUTO: 27.9 PG (ref 26–34)
MCHC RBC AUTO-ENTMCNC: 34.8 G/DL (ref 31–37)
MCV RBC AUTO: 80 FL (ref 80–100)
NRBC AUTOMATED: ABNORMAL PER 100 WBC
PDW BLD-RTO: 13 % (ref 11.5–14.9)
PLATELET # BLD: 198 K/UL (ref 150–450)
PMV BLD AUTO: 9 FL (ref 6–12)
POTASSIUM SERPL-SCNC: 3.9 MMOL/L (ref 3.7–5.3)
RBC # BLD: 4.94 M/UL (ref 4.5–5.9)
SODIUM BLD-SCNC: 137 MMOL/L (ref 135–144)
VANCOMYCIN TROUGH DATE LAST DOSE: NORMAL
VANCOMYCIN TROUGH DOSE AMOUNT: 2300
VANCOMYCIN TROUGH TIME LAST DOSE: NORMAL
VANCOMYCIN TROUGH: 10.5 UG/ML (ref 10–20)
WBC # BLD: 3.4 K/UL (ref 3.5–11)

## 2020-10-29 PROCEDURE — 86140 C-REACTIVE PROTEIN: CPT

## 2020-10-29 PROCEDURE — 80202 ASSAY OF VANCOMYCIN: CPT

## 2020-10-29 PROCEDURE — 85027 COMPLETE CBC AUTOMATED: CPT

## 2020-10-29 PROCEDURE — 80048 BASIC METABOLIC PNL TOTAL CA: CPT

## 2020-10-29 NOTE — TELEPHONE ENCOUNTER
Received voicemail message from Keyana/Enrico to report pt still hasn't gone to get his blood cultures. Called Keyana back- she said he is   6 days fever free.

## 2020-11-02 ENCOUNTER — HOSPITAL ENCOUNTER (OUTPATIENT)
Age: 20
Setting detail: SPECIMEN
Discharge: HOME | End: 2020-11-02
Payer: COMMERCIAL

## 2020-11-02 LAB
ANION GAP SERPL CALCULATED.3IONS-SCNC: 10 MMOL/L (ref 9–17)
BUN BLDV-MCNC: 11 MG/DL (ref 6–20)
BUN/CREAT BLD: ABNORMAL (ref 9–20)
CALCIUM SERPL-MCNC: 9.3 MG/DL (ref 8.6–10.4)
CHLORIDE BLD-SCNC: 102 MMOL/L (ref 98–107)
CO2: 23 MMOL/L (ref 20–31)
CREAT SERPL-MCNC: 1.01 MG/DL (ref 0.7–1.2)
GFR AFRICAN AMERICAN: >60 ML/MIN
GFR NON-AFRICAN AMERICAN: >60 ML/MIN
GFR SERPL CREATININE-BSD FRML MDRD: ABNORMAL ML/MIN/{1.73_M2}
GFR SERPL CREATININE-BSD FRML MDRD: ABNORMAL ML/MIN/{1.73_M2}
GLUCOSE BLD-MCNC: 104 MG/DL (ref 70–99)
POTASSIUM SERPL-SCNC: 4.3 MMOL/L (ref 3.7–5.3)
SODIUM BLD-SCNC: 135 MMOL/L (ref 135–144)
VANCOMYCIN TROUGH DATE LAST DOSE: 1500
VANCOMYCIN TROUGH DOSE AMOUNT: 2200
VANCOMYCIN TROUGH TIME LAST DOSE: NORMAL
VANCOMYCIN TROUGH: 14.3 UG/ML (ref 10–20)

## 2020-11-02 PROCEDURE — 80202 ASSAY OF VANCOMYCIN: CPT

## 2020-11-02 PROCEDURE — 80048 BASIC METABOLIC PNL TOTAL CA: CPT

## 2020-11-02 PROCEDURE — 36415 COLL VENOUS BLD VENIPUNCTURE: CPT

## 2020-11-05 ENCOUNTER — TELEPHONE (OUTPATIENT)
Dept: INFECTIOUS DISEASES | Age: 20
End: 2020-11-05

## 2020-11-05 ENCOUNTER — HOSPITAL ENCOUNTER (OUTPATIENT)
Age: 20
Setting detail: SPECIMEN
Discharge: HOME | End: 2020-11-05
Payer: COMMERCIAL

## 2020-11-05 LAB
ANION GAP SERPL CALCULATED.3IONS-SCNC: 11 MMOL/L (ref 9–17)
BUN BLDV-MCNC: 12 MG/DL (ref 6–20)
BUN/CREAT BLD: NORMAL (ref 9–20)
C-REACTIVE PROTEIN: 8.9 MG/L (ref 0–5)
CALCIUM SERPL-MCNC: 9.6 MG/DL (ref 8.6–10.4)
CHLORIDE BLD-SCNC: 102 MMOL/L (ref 98–107)
CO2: 25 MMOL/L (ref 20–31)
CREAT SERPL-MCNC: 0.85 MG/DL (ref 0.7–1.2)
GFR AFRICAN AMERICAN: >60 ML/MIN
GFR NON-AFRICAN AMERICAN: >60 ML/MIN
GFR SERPL CREATININE-BSD FRML MDRD: NORMAL ML/MIN/{1.73_M2}
GFR SERPL CREATININE-BSD FRML MDRD: NORMAL ML/MIN/{1.73_M2}
GLUCOSE BLD-MCNC: 96 MG/DL (ref 70–99)
HCT VFR BLD CALC: 41.2 % (ref 41–53)
HEMOGLOBIN: 14 G/DL (ref 13.5–17.5)
MCH RBC QN AUTO: 26.9 PG (ref 26–34)
MCHC RBC AUTO-ENTMCNC: 34.1 G/DL (ref 31–37)
MCV RBC AUTO: 79.1 FL (ref 80–100)
NRBC AUTOMATED: ABNORMAL PER 100 WBC
PDW BLD-RTO: 12.7 % (ref 11.5–14.9)
PLATELET # BLD: 182 K/UL (ref 150–450)
PMV BLD AUTO: 9.2 FL (ref 6–12)
POTASSIUM SERPL-SCNC: 4.2 MMOL/L (ref 3.7–5.3)
RBC # BLD: 5.21 M/UL (ref 4.5–5.9)
SODIUM BLD-SCNC: 138 MMOL/L (ref 135–144)
VANCOMYCIN TROUGH DATE LAST DOSE: NORMAL
VANCOMYCIN TROUGH DOSE AMOUNT: NORMAL
VANCOMYCIN TROUGH TIME LAST DOSE: NORMAL
VANCOMYCIN TROUGH: 13.8 UG/ML (ref 10–20)
WBC # BLD: 3.8 K/UL (ref 3.5–11)

## 2020-11-05 PROCEDURE — 80202 ASSAY OF VANCOMYCIN: CPT

## 2020-11-05 PROCEDURE — 85027 COMPLETE CBC AUTOMATED: CPT

## 2020-11-05 PROCEDURE — 86140 C-REACTIVE PROTEIN: CPT

## 2020-11-05 PROCEDURE — 80048 BASIC METABOLIC PNL TOTAL CA: CPT

## 2020-11-06 NOTE — TELEPHONE ENCOUNTER
Mallory Yusuf MD 2020 9:26 AM Pamela Queen, : [birth year]. PICC line is slow to flush and having trouble getting a blood return. Enrico is wondering if they can give the order for Cath flow/Activase? Bhumika Spicer Read 2020 10:36 PM 2020 10:36 PM Ok for [employer]      20-Jw informed. Yvonne Carrera

## 2020-11-09 ENCOUNTER — HOSPITAL ENCOUNTER (OUTPATIENT)
Age: 20
Setting detail: SPECIMEN
Discharge: HOME | End: 2020-11-09
Payer: COMMERCIAL

## 2020-11-09 ENCOUNTER — VIRTUAL VISIT (OUTPATIENT)
Dept: INFECTIOUS DISEASES | Age: 20
End: 2020-11-09
Payer: COMMERCIAL

## 2020-11-09 ENCOUNTER — HOSPITAL ENCOUNTER (OUTPATIENT)
Dept: CT IMAGING | Age: 20
Discharge: HOME | End: 2020-11-11
Payer: COMMERCIAL

## 2020-11-09 ENCOUNTER — HOSPITAL ENCOUNTER (OUTPATIENT)
Age: 20
Discharge: HOME | End: 2020-11-09
Payer: COMMERCIAL

## 2020-11-09 LAB
ANION GAP SERPL CALCULATED.3IONS-SCNC: 12 MMOL/L (ref 9–17)
BUN BLDV-MCNC: 13 MG/DL (ref 6–20)
BUN/CREAT BLD: ABNORMAL (ref 9–20)
CALCIUM SERPL-MCNC: 9.8 MG/DL (ref 8.6–10.4)
CHLORIDE BLD-SCNC: 105 MMOL/L (ref 98–107)
CO2: 23 MMOL/L (ref 20–31)
CREAT SERPL-MCNC: 0.98 MG/DL (ref 0.7–1.2)
GFR AFRICAN AMERICAN: >60 ML/MIN
GFR NON-AFRICAN AMERICAN: >60 ML/MIN
GFR SERPL CREATININE-BSD FRML MDRD: ABNORMAL ML/MIN/{1.73_M2}
GFR SERPL CREATININE-BSD FRML MDRD: ABNORMAL ML/MIN/{1.73_M2}
GLUCOSE BLD-MCNC: 121 MG/DL (ref 70–99)
POTASSIUM SERPL-SCNC: 4.3 MMOL/L (ref 3.7–5.3)
SODIUM BLD-SCNC: 140 MMOL/L (ref 135–144)
VANCOMYCIN TROUGH DATE LAST DOSE: NORMAL
VANCOMYCIN TROUGH DOSE AMOUNT: NORMAL
VANCOMYCIN TROUGH TIME LAST DOSE: NORMAL
VANCOMYCIN TROUGH: 12.2 UG/ML (ref 10–20)

## 2020-11-09 PROCEDURE — 1111F DSCHRG MED/CURRENT MED MERGE: CPT | Performed by: INTERNAL MEDICINE

## 2020-11-09 PROCEDURE — 36415 COLL VENOUS BLD VENIPUNCTURE: CPT

## 2020-11-09 PROCEDURE — 99214 OFFICE O/P EST MOD 30 MIN: CPT | Performed by: INTERNAL MEDICINE

## 2020-11-09 PROCEDURE — 80202 ASSAY OF VANCOMYCIN: CPT

## 2020-11-09 PROCEDURE — G8427 DOCREV CUR MEDS BY ELIG CLIN: HCPCS | Performed by: INTERNAL MEDICINE

## 2020-11-09 PROCEDURE — 71260 CT THORAX DX C+: CPT

## 2020-11-09 PROCEDURE — 6360000004 HC RX CONTRAST MEDICATION: Performed by: INTERNAL MEDICINE

## 2020-11-09 PROCEDURE — 80048 BASIC METABOLIC PNL TOTAL CA: CPT

## 2020-11-09 PROCEDURE — 87040 BLOOD CULTURE FOR BACTERIA: CPT

## 2020-11-09 RX ADMIN — IOPAMIDOL 75 ML: 755 INJECTION, SOLUTION INTRAVENOUS at 13:38

## 2020-11-09 NOTE — PATIENT INSTRUCTIONS
Return in about 4 weeks (around 12/7/2020). Check out comments: Pls ask laxmi to increase the vanco trough 14-17- not less *done.kks*  Start using the heparin flushes in the picc to avoid clotting *done.kks*  See me in a month after a CT chest done then - pls schedule  Keep same ceftriaxone and vanco x another 30 days till 12/9/20 *done.kks*  Same blood work creat and vanco 2 x per week and CRP cbc diff weekly till end of AB *done.kks*     *Jw gonzales/Laxmi notified*. [address] [place] [address] for patient to call me to schedule CT & Follow up.  Tala Hernandez

## 2020-11-09 NOTE — PROGRESS NOTES
Ernesto Garrett is a 23 y.o. male being evaluated by a Virtual Visit (video visit) encounter to address concerns as mentioned above. A caregiver was present when appropriate. Due to this being a TeleHealth encounter (During YPPUL-52 public health emergency), evaluation of the following organ systems was limited: Vitals/Constitutional/EENT/Resp/CV/GI//MS/Neuro/Skin/Heme-Lymph-Imm. Pursuant to the emergency declaration under the [address] [place] [address] and the [employer] Act, this Virtual Visit was conducted with patient's (and/or legal guardian's) consent, to reduce the patient's risk of exposure to COVID-19 and provide necessary medical care. The patient (and/or legal guardian) has also been advised to contact this office for worsening conditions or problems, and seek emergency medical treatment and/or call 911 if deemed necessary. Services were provided through a video synchronous discussion virtually to substitute for in-person clinic visit. Patient and provider were located at their individual homes. --Mary Myers MD on 11/9/2020 at 4:23 PM    An electronic signature was used to authenticate this note. [facility] - Initial Consult Note  Today's Date: 11/9/2020    Impression :   · Active pulmonary emboli bilat multilobar   · Fever 102, Sore throat from yelling 9/2020, no pharyngitis  · Failed po AB x 2 course  · BC + strep viridens x 1 and SNC x 1 [facility] - ?  Significance -9/22/20  · DANE neg 9/26/20 - teeth good  · All repeat BC neg - no ivdu  · F responded on ceftriaxone gent 10 days, relapsed on ceftriaxone alone -Ct worse 10/10/2020  · GC/ HIV/ chlamydia all neg - EBV inactive  · vanco / cefepime 10/12/20 - declined Bronch - fever/ low elevation CRP maintained till 10/21 LGF, the resolved - CT chest 11/9/20 much smaller nodules -vanco trough 10-12 14-17- not less  · Start using the heparin flushes in the picc to avoid clotting   · See me in a month after a CT chest done then - pls schedule  · Keep same ceftriaxone and vanco x another 30 days till 12/9/20  · Same blood work creat and vanco 2 x per week and CRP cbc diff weekly till end of AB        I have personally reviewed the past medical history, past surgical history, medications, social history, and family history, and I haveupdated the database accordingly.   Past Medical History:     Past Medical History:   Diagnosis Date    Depression     Schizoaffective disorder ([facility] [address]        Past Surgical  History:     Past Surgical History:   Procedure Laterality Date    HC PICC LINE DOUBLE LUMEN  10/15/2020         WISDOM TOOTH EXTRACTION      While in high school       Medications:     Current Outpatient Medications:     cefTRIAXone Sodium (ROCEPHIN IV), Infuse intravenously, Disp: , Rfl:     vancomycin (VANCOCIN), Infuse 1,500 mg intravenously every 12 hours, Disp: , Rfl:     albuterol sulfate HFA (VENTOLIN HFA) 108 (90 Base) MCG/ACT inhaler, Inhale 2 puffs into the lungs 4 times daily as needed for Wheezing, Disp: 1 Inhaler, Rfl: 0    FLUoxetine (PROZAC) 20 MG capsule, Take 20 mg by mouth daily, Disp: , Rfl:     hydrOXYzine (VISTARIL) 50 MG capsule, Take 50 mg by mouth every 6 hours as needed for Itching, Disp: , Rfl:     OLANZapine (ZYPREXA) 5 MG tablet, Take 5 mg by mouth nightly, Disp: , Rfl:     ibuprofen (ADVIL;MOTRIN) 400 MG tablet, Take 400 mg by mouth every 6 hours as needed for Pain, Disp: , Rfl:       Social History:     Social History     Socioeconomic History    Marital status: Single     Spouse name: Not on file    Number of children: Not on file    Years of education: Not on file    Highest education level: Not on file   Occupational History    Not on file   Social Needs    Financial resource strain: Not on file    Food insecurity     Worry: Not on file     Inability: Not on file  Transportation needs     Medical: Not on file     Non-medical: Not on file   Tobacco Use    Smoking status: Current Every Day Smoker   Substance and Sexual Activity    Alcohol use: Not Currently    Drug use: Not on file    Sexual activity: Not on file   Lifestyle    Physical activity     Days per week: Not on file     Minutes per session: Not on file    Stress: Not on file   Relationships    Social connections     Talks on phone: Not on file     Gets together: Not on file     Attends Yazdanism service: Not on file     Active member of club or organization: Not on file     Attends meetings of clubs or organizations: Not on file     Relationship status: Not on file    Intimate partner violence     Fear of current or ex partner: Not on file     Emotionally abused: Not on file     Physically abused: Not on file     Forced sexual activity: Not on file   Other Topics Concern    Not on file   Social History Narrative    Not on file       Family History:     Family History   Problem Relation Age of Onset    Stroke Mother         Allergies:   Patient has no known allergies. Review of Systems:   Review of Systems    Physical Examination :   There were no vitals taken for this visit.     Physical Exam      Medical Decision Making:   I have independently reviewed/ordered the following labs:    CBCwith Differential:   Lab Results   Component Value Date    WBC 3.8 11/05/2020    WBC 3.4 10/29/2020    HGB 14.0 11/05/2020    HGB 13.8 10/29/2020    HCT 41.2 11/05/2020    HCT 39.5 10/29/2020     11/05/2020     10/29/2020    LYMPHOPCT 39 10/22/2020    LYMPHOPCT 35 10/12/2020    MONOPCT 10 10/22/2020    MONOPCT 15 10/12/2020     BMP:  Lab Results   Component Value Date     11/09/2020     11/05/2020    K 4.3 11/09/2020    K 4.2 11/05/2020     11/09/2020     11/05/2020    CO2 23 11/09/2020    CO2 25 11/05/2020    BUN 13 11/09/2020    BUN 12 11/05/2020    CREATININE 0.98 11/09/2020 CREATININE 0.85 11/05/2020     Hepatic Function Panel:   Lab Results   Component Value Date    PROT 8.1 10/10/2020    PROT 8.0 10/02/2020    LABALBU 4.1 10/10/2020    LABALBU 4.3 10/02/2020    BILIDIR 0.10 10/10/2020    BILIDIR 0.11 10/02/2020    IBILI 0.19 10/10/2020    IBILI 0.25 10/02/2020    BILITOT 0.29 10/10/2020    BILITOT 0.36 10/02/2020    ALKPHOS 61 10/10/2020    ALKPHOS 63 10/02/2020    ALT 30 10/10/2020    ALT 57 10/02/2020    AST 25 10/10/2020    AST 28 10/02/2020     No results found for: RPR  No results found for: HIV  No results found for: [facility]  Lab Results   Component Value Date    RBC 5.21 11/05/2020    WBC 3.8 11/05/2020     Lab Results   Component Value Date    CREATININE 0.98 11/09/2020    GLUCOSE 121 11/09/2020   you for allowing us to participate in the care of this patient. Please call with questions. Claudette Kell, MD  - Office: [phone]    Please note that this chart was generated using voice recognition Dragon dictation software. Although every effort was made to ensure the accuracy of this automated transcription, some errors in transcription mayhave occurred.

## 2020-11-12 ENCOUNTER — HOSPITAL ENCOUNTER (OUTPATIENT)
Age: 20
Setting detail: SPECIMEN
Discharge: HOME | End: 2020-11-12
Payer: COMMERCIAL

## 2020-11-12 LAB
BUN BLDV-MCNC: 12 MG/DL (ref 6–20)
CREAT SERPL-MCNC: 0.93 MG/DL (ref 0.7–1.2)
GFR AFRICAN AMERICAN: >60 ML/MIN
GFR NON-AFRICAN AMERICAN: >60 ML/MIN
GFR SERPL CREATININE-BSD FRML MDRD: NORMAL ML/MIN/{1.73_M2}
GFR SERPL CREATININE-BSD FRML MDRD: NORMAL ML/MIN/{1.73_M2}
VANCOMYCIN TROUGH DATE LAST DOSE: NORMAL
VANCOMYCIN TROUGH DOSE AMOUNT: NORMAL
VANCOMYCIN TROUGH TIME LAST DOSE: 2230
VANCOMYCIN TROUGH: 15.7 UG/ML (ref 10–20)

## 2020-11-12 PROCEDURE — 82565 ASSAY OF CREATININE: CPT

## 2020-11-12 PROCEDURE — 80202 ASSAY OF VANCOMYCIN: CPT

## 2020-11-12 PROCEDURE — 84520 ASSAY OF UREA NITROGEN: CPT

## 2020-11-16 ENCOUNTER — HOSPITAL ENCOUNTER (OUTPATIENT)
Age: 20
Setting detail: SPECIMEN
Discharge: HOME | End: 2020-11-16
Payer: COMMERCIAL

## 2020-11-16 ENCOUNTER — TELEPHONE (OUTPATIENT)
Dept: INFECTIOUS DISEASES | Age: 20
End: 2020-11-16

## 2020-11-16 LAB
BUN BLDV-MCNC: 9 MG/DL (ref 6–20)
C-REACTIVE PROTEIN: 7.5 MG/L (ref 0–5)
CREAT SERPL-MCNC: 0.94 MG/DL (ref 0.7–1.2)
GFR AFRICAN AMERICAN: >60 ML/MIN
GFR NON-AFRICAN AMERICAN: >60 ML/MIN
GFR SERPL CREATININE-BSD FRML MDRD: NORMAL ML/MIN/{1.73_M2}
GFR SERPL CREATININE-BSD FRML MDRD: NORMAL ML/MIN/{1.73_M2}
HCT VFR BLD CALC: 40.1 % (ref 41–53)
HEMOGLOBIN: 14 G/DL (ref 13.5–17.5)
MCH RBC QN AUTO: 27.4 PG (ref 26–34)
MCHC RBC AUTO-ENTMCNC: 34.8 G/DL (ref 31–37)
MCV RBC AUTO: 78.7 FL (ref 80–100)
NRBC AUTOMATED: ABNORMAL PER 100 WBC
PDW BLD-RTO: 13.5 % (ref 11.5–14.9)
PLATELET # BLD: 195 K/UL (ref 150–450)
PMV BLD AUTO: 8.6 FL (ref 6–12)
RBC # BLD: 5.09 M/UL (ref 4.5–5.9)
SEDIMENTATION RATE, ERYTHROCYTE: 9 MM (ref 0–15)
VANCOMYCIN TROUGH DATE LAST DOSE: ABNORMAL
VANCOMYCIN TROUGH DOSE AMOUNT: ABNORMAL
VANCOMYCIN TROUGH TIME LAST DOSE: 130
VANCOMYCIN TROUGH: 23.9 UG/ML (ref 10–20)
WBC # BLD: 4.2 K/UL (ref 3.5–11)

## 2020-11-16 PROCEDURE — 85027 COMPLETE CBC AUTOMATED: CPT

## 2020-11-16 PROCEDURE — 85652 RBC SED RATE AUTOMATED: CPT

## 2020-11-16 PROCEDURE — 84520 ASSAY OF UREA NITROGEN: CPT

## 2020-11-16 PROCEDURE — 82565 ASSAY OF CREATININE: CPT

## 2020-11-16 PROCEDURE — 86140 C-REACTIVE PROTEIN: CPT

## 2020-11-16 PROCEDURE — 80202 ASSAY OF VANCOMYCIN: CPT

## 2020-11-16 NOTE — TELEPHONE ENCOUNTER
I received a faxed vanco trough on patient of 23.9 from 11/16/20. Enrico is dosing vanco. I called Enrico and spoke with Bryce Thomas and informed her of vanco trough. She voiced understanding. I also faxed the lab results to her at [phone].

## 2020-11-17 ENCOUNTER — HOSPITAL ENCOUNTER (OUTPATIENT)
Age: 20
Setting detail: SPECIMEN
Discharge: HOME | End: 2020-11-17
Payer: COMMERCIAL

## 2020-11-17 LAB
VANCOMYCIN TROUGH DATE LAST DOSE: NORMAL
VANCOMYCIN TROUGH DOSE AMOUNT: NORMAL
VANCOMYCIN TROUGH TIME LAST DOSE: 2200
VANCOMYCIN TROUGH: 11 UG/ML (ref 10–20)

## 2020-11-17 PROCEDURE — 80202 ASSAY OF VANCOMYCIN: CPT

## 2020-11-19 ENCOUNTER — HOSPITAL ENCOUNTER (OUTPATIENT)
Age: 20
Setting detail: SPECIMEN
Discharge: HOME | End: 2020-11-19
Payer: COMMERCIAL

## 2020-11-19 LAB
BUN BLDV-MCNC: 10 MG/DL (ref 6–20)
CREAT SERPL-MCNC: 0.98 MG/DL (ref 0.7–1.2)
GFR AFRICAN AMERICAN: >60 ML/MIN
GFR NON-AFRICAN AMERICAN: >60 ML/MIN
GFR SERPL CREATININE-BSD FRML MDRD: NORMAL ML/MIN/{1.73_M2}
GFR SERPL CREATININE-BSD FRML MDRD: NORMAL ML/MIN/{1.73_M2}
VANCOMYCIN TROUGH DATE LAST DOSE: NORMAL
VANCOMYCIN TROUGH DOSE AMOUNT: 2230
VANCOMYCIN TROUGH TIME LAST DOSE: 1650
VANCOMYCIN TROUGH: 12 UG/ML (ref 10–20)

## 2020-11-19 PROCEDURE — 84520 ASSAY OF UREA NITROGEN: CPT

## 2020-11-19 PROCEDURE — 80202 ASSAY OF VANCOMYCIN: CPT

## 2020-11-19 PROCEDURE — 36415 COLL VENOUS BLD VENIPUNCTURE: CPT

## 2020-11-19 PROCEDURE — 82565 ASSAY OF CREATININE: CPT

## 2020-11-23 ENCOUNTER — HOSPITAL ENCOUNTER (OUTPATIENT)
Age: 20
Discharge: HOME | End: 2020-11-23
Payer: COMMERCIAL

## 2020-11-23 LAB
ABSOLUTE BANDS #: 0.04 K/UL (ref 0–1)
ABSOLUTE EOS #: 0.13 K/UL (ref 0–0.4)
ABSOLUTE IMMATURE GRANULOCYTE: ABNORMAL K/UL (ref 0–0.3)
ABSOLUTE LYMPH #: 1.3 K/UL (ref 1–4.8)
ABSOLUTE MONO #: 0.84 K/UL (ref 0.1–1.3)
BANDS: 1 % (ref 0–10)
BASOPHILS # BLD: 1 % (ref 0–2)
BASOPHILS ABSOLUTE: 0.04 K/UL (ref 0–0.2)
BUN BLDV-MCNC: 12 MG/DL (ref 6–20)
C-REACTIVE PROTEIN: 4.6 MG/L (ref 0–5)
CREAT SERPL-MCNC: 1.03 MG/DL (ref 0.7–1.2)
DIFFERENTIAL TYPE: ABNORMAL
EOSINOPHILS RELATIVE PERCENT: 3 % (ref 0–4)
GFR AFRICAN AMERICAN: >60 ML/MIN
GFR NON-AFRICAN AMERICAN: >60 ML/MIN
GFR SERPL CREATININE-BSD FRML MDRD: NORMAL ML/MIN/{1.73_M2}
GFR SERPL CREATININE-BSD FRML MDRD: NORMAL ML/MIN/{1.73_M2}
HCT VFR BLD CALC: 38.5 % (ref 41–53)
HEMOGLOBIN: 13.3 G/DL (ref 13.5–17.5)
IMMATURE GRANULOCYTES: ABNORMAL %
LYMPHOCYTES # BLD: 31 % (ref 24–44)
MCH RBC QN AUTO: 27.3 PG (ref 26–34)
MCHC RBC AUTO-ENTMCNC: 34.6 G/DL (ref 31–37)
MCV RBC AUTO: 78.9 FL (ref 80–100)
MONOCYTES # BLD: 20 % (ref 1–7)
MORPHOLOGY: NORMAL
NRBC AUTOMATED: ABNORMAL PER 100 WBC
PDW BLD-RTO: 14 % (ref 11.5–14.9)
PLATELET # BLD: 189 K/UL (ref 150–450)
PLATELET ESTIMATE: ABNORMAL
PMV BLD AUTO: 8 FL (ref 6–12)
RBC # BLD: 4.88 M/UL (ref 4.5–5.9)
RBC # BLD: ABNORMAL 10*6/UL
SEDIMENTATION RATE, ERYTHROCYTE: 7 MM (ref 0–15)
SEG NEUTROPHILS: 44 % (ref 36–66)
SEGMENTED NEUTROPHILS ABSOLUTE COUNT: 1.85 K/UL (ref 1.3–9.1)
VANCOMYCIN TROUGH DATE LAST DOSE: NORMAL
VANCOMYCIN TROUGH DOSE AMOUNT: 0
VANCOMYCIN TROUGH TIME LAST DOSE: NORMAL
VANCOMYCIN TROUGH: 17.8 UG/ML (ref 10–20)
WBC # BLD: 4.2 K/UL (ref 3.5–11)
WBC # BLD: ABNORMAL 10*3/UL

## 2020-11-23 PROCEDURE — 85652 RBC SED RATE AUTOMATED: CPT

## 2020-11-23 PROCEDURE — 82565 ASSAY OF CREATININE: CPT

## 2020-11-23 PROCEDURE — 84520 ASSAY OF UREA NITROGEN: CPT

## 2020-11-23 PROCEDURE — 86140 C-REACTIVE PROTEIN: CPT

## 2020-11-23 PROCEDURE — 36415 COLL VENOUS BLD VENIPUNCTURE: CPT

## 2020-11-23 PROCEDURE — 85025 COMPLETE CBC W/AUTO DIFF WBC: CPT

## 2020-11-23 PROCEDURE — 80202 ASSAY OF VANCOMYCIN: CPT

## 2020-11-27 ENCOUNTER — HOSPITAL ENCOUNTER (OUTPATIENT)
Age: 20
Setting detail: SPECIMEN
Discharge: HOME | End: 2020-11-27
Payer: COMMERCIAL

## 2020-11-27 LAB
BUN BLDV-MCNC: 16 MG/DL (ref 6–20)
CREAT SERPL-MCNC: 1.01 MG/DL (ref 0.7–1.2)
GFR AFRICAN AMERICAN: >60 ML/MIN
GFR NON-AFRICAN AMERICAN: >60 ML/MIN
GFR SERPL CREATININE-BSD FRML MDRD: NORMAL ML/MIN/{1.73_M2}
GFR SERPL CREATININE-BSD FRML MDRD: NORMAL ML/MIN/{1.73_M2}
VANCOMYCIN TROUGH DATE LAST DOSE: NORMAL
VANCOMYCIN TROUGH DOSE AMOUNT: NORMAL
VANCOMYCIN TROUGH TIME LAST DOSE: 2230
VANCOMYCIN TROUGH: 18.8 UG/ML (ref 10–20)

## 2020-11-27 PROCEDURE — 84520 ASSAY OF UREA NITROGEN: CPT

## 2020-11-27 PROCEDURE — 80202 ASSAY OF VANCOMYCIN: CPT

## 2020-11-27 PROCEDURE — 82565 ASSAY OF CREATININE: CPT

## 2020-11-30 ENCOUNTER — HOSPITAL ENCOUNTER (OUTPATIENT)
Dept: CT IMAGING | Age: 20
Discharge: HOME | End: 2020-12-02
Payer: COMMERCIAL

## 2020-11-30 ENCOUNTER — HOSPITAL ENCOUNTER (OUTPATIENT)
Age: 20
Setting detail: SPECIMEN
Discharge: HOME | End: 2020-11-30
Payer: COMMERCIAL

## 2020-11-30 LAB
BUN BLDV-MCNC: 13 MG/DL (ref 6–20)
C-REACTIVE PROTEIN: 4.6 MG/L (ref 0–5)
CREAT SERPL-MCNC: 0.93 MG/DL (ref 0.7–1.2)
GFR AFRICAN AMERICAN: >60 ML/MIN
GFR NON-AFRICAN AMERICAN: >60 ML/MIN
GFR SERPL CREATININE-BSD FRML MDRD: NORMAL ML/MIN/{1.73_M2}
GFR SERPL CREATININE-BSD FRML MDRD: NORMAL ML/MIN/{1.73_M2}
HCT VFR BLD CALC: 42.9 % (ref 41–53)
HEMOGLOBIN: 14.5 G/DL (ref 13.5–17.5)
MCH RBC QN AUTO: 26.8 PG (ref 26–34)
MCHC RBC AUTO-ENTMCNC: 33.7 G/DL (ref 31–37)
MCV RBC AUTO: 79.5 FL (ref 80–100)
NRBC AUTOMATED: ABNORMAL PER 100 WBC
PDW BLD-RTO: 14.1 % (ref 11.5–14.9)
PLATELET # BLD: 207 K/UL (ref 150–450)
PMV BLD AUTO: 10 FL (ref 6–12)
RBC # BLD: 5.39 M/UL (ref 4.5–5.9)
SEDIMENTATION RATE, ERYTHROCYTE: 9 MM (ref 0–15)
VANCOMYCIN TROUGH DATE LAST DOSE: NORMAL
VANCOMYCIN TROUGH DOSE AMOUNT: 2230
VANCOMYCIN TROUGH TIME LAST DOSE: 1650
VANCOMYCIN TROUGH: 14.9 UG/ML (ref 10–20)
WBC # BLD: 5 K/UL (ref 3.5–11)

## 2020-11-30 PROCEDURE — 36415 COLL VENOUS BLD VENIPUNCTURE: CPT

## 2020-11-30 PROCEDURE — 84520 ASSAY OF UREA NITROGEN: CPT

## 2020-11-30 PROCEDURE — 80202 ASSAY OF VANCOMYCIN: CPT

## 2020-11-30 PROCEDURE — 85027 COMPLETE CBC AUTOMATED: CPT

## 2020-11-30 PROCEDURE — 71260 CT THORAX DX C+: CPT

## 2020-11-30 PROCEDURE — 82565 ASSAY OF CREATININE: CPT

## 2020-11-30 PROCEDURE — 86140 C-REACTIVE PROTEIN: CPT

## 2020-11-30 PROCEDURE — 85652 RBC SED RATE AUTOMATED: CPT

## 2020-11-30 PROCEDURE — 6360000004 HC RX CONTRAST MEDICATION: Performed by: INTERNAL MEDICINE

## 2020-11-30 RX ADMIN — IOPAMIDOL 75 ML: 755 INJECTION, SOLUTION INTRAVENOUS at 12:18

## 2020-12-03 ENCOUNTER — TELEPHONE (OUTPATIENT)
Dept: INFECTIOUS DISEASES | Age: 20
End: 2020-12-03

## 2020-12-03 NOTE — TELEPHONE ENCOUNTER
HORACIO from [place] is calling stating that pt missed both of his vanco doses yesterday so she did not draw the vanco trough and she informed the pt to get his vanco trough done at [facility] tomorrow.

## 2020-12-04 ENCOUNTER — HOSPITAL ENCOUNTER (OUTPATIENT)
Age: 20
Discharge: HOME | End: 2020-12-04
Payer: COMMERCIAL

## 2020-12-04 ENCOUNTER — TELEPHONE (OUTPATIENT)
Dept: INFECTIOUS DISEASES | Age: 20
End: 2020-12-04

## 2020-12-04 LAB
BUN BLDV-MCNC: 13 MG/DL (ref 6–20)
C-REACTIVE PROTEIN: 3.5 MG/L (ref 0–5)
CREAT SERPL-MCNC: 0.94 MG/DL (ref 0.7–1.2)
GFR AFRICAN AMERICAN: >60 ML/MIN
GFR NON-AFRICAN AMERICAN: >60 ML/MIN
GFR SERPL CREATININE-BSD FRML MDRD: NORMAL ML/MIN/{1.73_M2}
GFR SERPL CREATININE-BSD FRML MDRD: NORMAL ML/MIN/{1.73_M2}
HCT VFR BLD CALC: 42 % (ref 41–53)
HEMOGLOBIN: 14.4 G/DL (ref 13.5–17.5)
MCH RBC QN AUTO: 27.2 PG (ref 26–34)
MCHC RBC AUTO-ENTMCNC: 34.4 G/DL (ref 31–37)
MCV RBC AUTO: 79 FL (ref 80–100)
NRBC AUTOMATED: ABNORMAL PER 100 WBC
PDW BLD-RTO: 14.5 % (ref 11.5–14.9)
PLATELET # BLD: 208 K/UL (ref 150–450)
PMV BLD AUTO: 8.7 FL (ref 6–12)
RBC # BLD: 5.32 M/UL (ref 4.5–5.9)
SEDIMENTATION RATE, ERYTHROCYTE: 6 MM (ref 0–15)
VANCOMYCIN TROUGH DATE LAST DOSE: NORMAL
VANCOMYCIN TROUGH DOSE AMOUNT: 815
VANCOMYCIN TROUGH TIME LAST DOSE: 1650
VANCOMYCIN TROUGH: 10.5 UG/ML (ref 10–20)
WBC # BLD: 4.5 K/UL (ref 3.5–11)

## 2020-12-04 PROCEDURE — 82565 ASSAY OF CREATININE: CPT

## 2020-12-04 PROCEDURE — 85027 COMPLETE CBC AUTOMATED: CPT

## 2020-12-04 PROCEDURE — 36415 COLL VENOUS BLD VENIPUNCTURE: CPT

## 2020-12-04 PROCEDURE — 86140 C-REACTIVE PROTEIN: CPT

## 2020-12-04 PROCEDURE — 84520 ASSAY OF UREA NITROGEN: CPT

## 2020-12-04 PROCEDURE — 85652 RBC SED RATE AUTOMATED: CPT

## 2020-12-04 PROCEDURE — 80202 ASSAY OF VANCOMYCIN: CPT

## 2020-12-04 NOTE — TELEPHONE ENCOUNTER
12/4/2020 10:55 AM Please call Dr. Светлана Anderson at [facility] [phone] : Reg:   Sammye Bumpers  23year old male  2000  Thanks, Patric Keesha 12/4/2020 10:57 AM 12/4/2020 11:21 AM I have played dc ordered for Negrita Mcelroy for a blood test. Urine test. Ct and pelvis. Referral to immunology. Pls call him to get all done ASAP. I also ordered itraconazole pills two x per day and labs twice a week. X one month. Ordered a itraconszole level in a week. Pls try to reach him. Very important he starts on all. Tried to call him many times. No response. I called the [facility] doc. Likely this is histoplasmosis. Hence I am doing all the above. If he is avail I like to talk to the Pt. No need for [facility] referral anymore. Let me know. SEND © 2020, [employer] Version 2.1.2     I talked and explained all to him. Pls schedule w dr Omar Steward. Schedule Ct chest in 6 weeks.    See me 4 weeks    `

## 2020-12-04 NOTE — TELEPHONE ENCOUNTER
Feng Gilman MD 12/4/2020 1:57 PM Did you tell Darren Castillo to go to [facility] today to get his PICC line removed? If so they need an order and I didn't see anything about this in his chart. Yessica Polk Read 12/4/2020 2:33 PM 12/4/2020 2:10 PM Patient is there on 3C right now. Read 12/4/2020 2:33 PM 12/4/2020 2:34 PM Yes. Pull line. Stop all AB that are given before. I had send a result message last week about it. I m surprised was not done 12/4/2020 2:42 PM That message was to stop and pull the line on 12/9. Read 12/4/2020 2:44 PM 12/4/2020 2:45 PM Really! I m sure There was another. Newer one. Saying to stop all   A result note 12/4/2020 2:49 PM I looked in all results for lab and radiology and encounters. I don't see one. This note was from 12/2 on his 11/30 labs. Read 12/4/2020 2:49 PM 12/4/2020 2:49 PM Ok. Rodolfo Crawford on 3C confirmed the line is getting pulled.

## 2020-12-08 ENCOUNTER — HOSPITAL ENCOUNTER (OUTPATIENT)
Age: 20
Setting detail: SPECIMEN
Discharge: HOME | End: 2020-12-08
Payer: COMMERCIAL

## 2020-12-08 LAB
ALBUMIN SERPL-MCNC: 4.3 G/DL (ref 3.5–5.2)
ALBUMIN/GLOBULIN RATIO: 1.5 (ref 1–2.5)
ALP BLD-CCNC: 61 U/L (ref 40–129)
ALT SERPL-CCNC: 51 U/L (ref 5–41)
AST SERPL-CCNC: 39 U/L
BILIRUB SERPL-MCNC: 0.32 MG/DL (ref 0.3–1.2)
BILIRUBIN DIRECT: 0.11 MG/DL
BILIRUBIN, INDIRECT: 0.21 MG/DL (ref 0–1)
GLOBULIN: ABNORMAL G/DL (ref 1.5–3.8)
TOTAL PROTEIN: 7.1 G/DL (ref 6.4–8.3)

## 2020-12-11 LAB
HYDROXYITRACONAZOLE LEVEL: 1 UG/ML
ITRACONAZOLE LVL: 0.5 UG/ML

## 2020-12-14 LAB
HISTOPLASMA AG, S: NOT DETECTED
HISTOPLASMA ANTIGEN, SERUM: NOT DETECTED

## 2020-12-15 LAB
HISTOPLASMA ABS, ID: DETECTED
HISTOPLASMA ANTIBODY MYCELIAL CF: NORMAL
HISTOPLASMA ANTIBODY YEAST CF: NORMAL

## 2021-01-04 ENCOUNTER — OFFICE VISIT (OUTPATIENT)
Dept: INFECTIOUS DISEASES | Age: 21
End: 2021-01-04
Payer: COMMERCIAL

## 2021-01-04 VITALS
OXYGEN SATURATION: 97 % | TEMPERATURE: 97.6 F | DIASTOLIC BLOOD PRESSURE: 80 MMHG | RESPIRATION RATE: 16 BRPM | WEIGHT: 209 LBS | HEIGHT: 67 IN | SYSTOLIC BLOOD PRESSURE: 128 MMHG | HEART RATE: 61 BPM | BODY MASS INDEX: 32.8 KG/M2

## 2021-01-04 DIAGNOSIS — B39.2: Primary | ICD-10-CM

## 2021-01-04 PROCEDURE — 99214 OFFICE O/P EST MOD 30 MIN: CPT | Performed by: INTERNAL MEDICINE

## 2021-01-04 PROCEDURE — G8484 FLU IMMUNIZE NO ADMIN: HCPCS | Performed by: INTERNAL MEDICINE

## 2021-01-04 PROCEDURE — 4004F PT TOBACCO SCREEN RCVD TLK: CPT | Performed by: INTERNAL MEDICINE

## 2021-01-04 PROCEDURE — G8427 DOCREV CUR MEDS BY ELIG CLIN: HCPCS | Performed by: INTERNAL MEDICINE

## 2021-01-04 PROCEDURE — G8417 CALC BMI ABV UP PARAM F/U: HCPCS | Performed by: INTERNAL MEDICINE

## 2021-01-04 RX ORDER — OLANZAPINE 10 MG/1
TABLET ORAL
COMMUNITY
Start: 2020-12-29

## 2021-01-04 RX ORDER — FLUOXETINE HYDROCHLORIDE 40 MG/1
CAPSULE ORAL
COMMUNITY
Start: 2020-12-29 | End: 2021-01-29 | Stop reason: ALTCHOICE

## 2021-01-04 ASSESSMENT — ENCOUNTER SYMPTOMS
APNEA: 0
ABDOMINAL DISTENTION: 0
EYE DISCHARGE: 0
COLOR CHANGE: 0

## 2021-01-04 NOTE — PROGRESS NOTES
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 Significance -9/22/20  · DANE neg 9/26/20 - teeth good  · All repeat BC neg - no ivdu  · F responded on ceftriaxone gent 10 days, relapsed on ceftriaxone alone -Ct worse 10/10/2020  · GC/ HIV/ chlamydia all neg - EBV inactive  · vanco / cefepime 10/12/20 - declined Bronch - fever/ low elevation CRP maintained till 10/21 LGF, the resolved - CT chest 11/9/20 much smaller nodules -vanco trough 10-12 always  · 11/9/20 increase vanco trough between 14-17 to improve response - DW pt risk on kidneys through small  · 10/2020 titers high for histo - serum AG neg  · 12/420 itraconazole 200 mg daily  · 12/4 histo titers down by half  · 12/4 CT chest lesions had increased then stabilized    Recommendations   Histoplasma AB 10/2020 1/32 and 1/512  Repeat early itra tx was already better 1/16 and 1/128  Serum AG was neg in blood - U test never ran. Plan 6 mon histo at least for now  Saw DR Juan Leal and will ask for FU look for immuno def     Diagnosis Orders   1. Pneumonia, Histoplasma capsulatum ([facility] [address]  CT CHEST W CONTRAST    Itraconazole Level    Histoplasma Antibodies    Miscellaneous Sendout 1    External Referral To Allergy       Return in about 4 weeks (around 2/1/2021).       History of Present Illness:   Melody Beavers is a 23y.o.-year-old  male who presents with   Chief Complaint   Patient presents with    Follow-up     Follow up with CT from [address] [facility]   virtual visit 11/9/20  · Active pulmonary emboli bilat multilobar   · Fever 102, Sore throat from yelling 9/2020, no pharyngitis  · Failed po AB x 2 course · BC + strep viridens x 1 and SNC x 1 [facility] - ? Significance -9/22/20  · DANE neg 9/26/20 - teeth good  · All repeat BC neg - no ivdu  · F responded on ceftriaxone gent 10 days, relapsed on ceftriaxone alone -Ct worse 10/10/2020  · GC/ HIV/ chlamydia all neg - EBV inactive  · vanco / cefepime started 10/12/20 - declined Bronch -offered [facility], never scheduled. · fever/ low elevation CRP maintained till 10/21 LGF, then resolved - CT chest 11/9/20 much smaller nodules -vanco trough 10-12 always  · 11/9/20 increase vanco trough between 14-17 to improve response - DW pt risk on kidneys through small    He feels good no abdominal pain no diarrhea tolerating the medication very well the PICC line has been clotted but it has been opened and it does not flush at this point. The patient is on saline flushes, will ask [place] to make it heparin flushes to avoid further clotting of the picc. Pt happy no issues or complaints    Plan   · coram to increase the vanco trough 14-17- not less  · Start using the heparin flushes in the picc to avoid clotting   · See me in a month after a CT chest done then - pls schedule  · Keep same ceftriaxone and vanco x another 30 days till 12/9/20  · Same blood work creat and vanco 2 x per week and CRP cbc diff weekly till end of AB    Visit 1/4/21  Histo titers increased 10/2020 and dropped by half 12/4/20  conferred w [facility] ID and due to concern for diffuse pulm and mediastinal histo, started 100 mg bid itraconazole 12/4/20 and did well - histoplasma better  Level was 0.5- LFT ok  Feels great - no fever  Or sweats  reepat CT chest w contrast   Likely histo diffuse mediastinal and pulm -   To see DR Chan Corcoran for ? underlying immuno deficiency - no appoint yet, weill facilitate. Histoplasma AB 10/2020 1/32 and 1/512  Repeat early itra tx was already better 1/16 and 1/128  Serum AG was neg in blood - U test never ran.     Plan;  Plan 6 mon histo at least for now Immunologist look for immuno def  Repeat histo titers  And U histo AG  itracon level  Ct chest w contrast  See in 1 month    I have personally reviewed the past medical history, past surgical history, medications, social history, and family history, and I haveupdated the database accordingly.   Past Medical History:     Past Medical History:   Diagnosis Date    Depression     Schizoaffective disorder ([facility] [address]        Past Surgical  History:     Past Surgical History:   Procedure Laterality Date    HC PICC LINE DOUBLE LUMEN  10/15/2020         WISDOM TOOTH EXTRACTION      While in high school       Medications:     Current Outpatient Medications:     FLUoxetine (PROZAC) 40 MG capsule, take 1 capsule by mouth once daily, Disp: , Rfl:     OLANZapine (ZYPREXA) 10 MG tablet, take 1 tablet by mouth once daily, Disp: , Rfl:     albuterol sulfate HFA (VENTOLIN HFA) 108 (90 Base) MCG/ACT inhaler, Inhale 2 puffs into the lungs 4 times daily as needed for Wheezing, Disp: 1 Inhaler, Rfl: 0    hydrOXYzine (VISTARIL) 50 MG capsule, Take 50 mg by mouth every 6 hours as needed for Itching, Disp: , Rfl:     ibuprofen (ADVIL;MOTRIN) 400 MG tablet, Take 400 mg by mouth every 6 hours as needed for Pain, Disp: , Rfl:     FLUoxetine (PROZAC) 20 MG capsule, Take 20 mg by mouth daily, Disp: , Rfl:     OLANZapine (ZYPREXA) 5 MG tablet, Take 5 mg by mouth nightly, Disp: , Rfl:       Social History:     Social History     Socioeconomic History    Marital status: Single     Spouse name: Not on file    Number of children: Not on file    Years of education: Not on file    Highest education level: Not on file   Occupational History    Not on file   Social Needs    Financial resource strain: Not on file    Food insecurity     Worry: Not on file     Inability: Not on file    Transportation needs     Medical: Not on file     Non-medical: Not on file   Tobacco Use    Smoking status: Current Every Day Smoker     Types: Cigarettes  Smokeless tobacco: Never Used   Substance and Sexual Activity    Alcohol use: Not Currently    Drug use: Not on file    Sexual activity: Not on file   Lifestyle    Physical activity     Days per week: Not on file     Minutes per session: Not on file    Stress: Not on file   Relationships    Social connections     Talks on phone: Not on file     Gets together: Not on file     Attends Jew service: Not on file     Active member of club or organization: Not on file     Attends meetings of clubs or organizations: Not on file     Relationship status: Not on file    Intimate partner violence     Fear of current or ex partner: Not on file     Emotionally abused: Not on file     Physically abused: Not on file     Forced sexual activity: Not on file   Other Topics Concern    Not on file   Social History Narrative    Not on file       Family History:     Family History   Problem Relation Age of Onset    Stroke Mother         Allergies:   Patient has no known allergies. Review of Systems:   Review of Systems   Constitutional: Negative for activity change and appetite change. HENT: Negative for congestion. Eyes: Negative for discharge. Respiratory: Negative for apnea. Cardiovascular: Negative for chest pain. Gastrointestinal: Negative for abdominal distention. Endocrine: Negative for heat intolerance. Genitourinary: Negative for dysuria. Musculoskeletal: Negative for arthralgias. Skin: Negative for color change. Allergic/Immunologic: Negative for immunocompromised state. Neurological: Negative for dizziness. Hematological: Negative for adenopathy. Psychiatric/Behavioral: Negative for agitation. Physical Examination :   Blood pressure 128/80, pulse 61, temperature 97.6 °F (36.4 °C), temperature source Temporal, resp. rate 16, height 5' 7\" (1.702 m), weight 209 lb (94.8 kg), SpO2 97 %. Physical Exam  Constitutional:       General: He is not in acute distress. Appearance: Normal appearance. He is not ill-appearing. HENT:      Head: Normocephalic and atraumatic. Nose: Nose normal.      Mouth/Throat:      Mouth: Mucous membranes are moist.   Eyes:      General: No scleral icterus. Conjunctiva/sclera: Conjunctivae normal.   Neck:      Musculoskeletal: Neck supple. No neck rigidity. Cardiovascular:      Rate and Rhythm: Normal rate and regular rhythm. Heart sounds: Normal heart sounds. No murmur. Pulmonary:      Effort: No respiratory distress. Breath sounds: Normal breath sounds. Abdominal:      General: There is no distension. Palpations: Abdomen is soft. Genitourinary:     Comments: No steel  Musculoskeletal:         General: No swelling or deformity. Skin:     General: Skin is dry. Coloration: Skin is not jaundiced. Neurological:      General: No focal deficit present. Mental Status: He is alert. Psychiatric:         Mood and Affect: Mood normal.         Thought Content:  Thought content normal.           Medical Decision Making:   I have independently reviewed/ordered the following labs:    CBCwith Differential:   Lab Results   Component Value Date    WBC 4.5 12/04/2020    WBC 5.0 11/30/2020    HGB 14.4 12/04/2020    HGB 14.5 11/30/2020    HCT 42.0 12/04/2020    HCT 42.9 11/30/2020     12/04/2020     11/30/2020    LYMPHOPCT 31 11/23/2020    LYMPHOPCT 39 10/22/2020    MONOPCT 20 11/23/2020    MONOPCT 10 10/22/2020     BMP:  Lab Results   Component Value Date     11/09/2020     11/05/2020    K 4.3 11/09/2020    K 4.2 11/05/2020     11/09/2020     11/05/2020    CO2 23 11/09/2020    CO2 25 11/05/2020    BUN 13 12/04/2020    BUN 13 11/30/2020    CREATININE 0.94 12/04/2020    CREATININE 0.93 11/30/2020     Hepatic Function Panel:   Lab Results   Component Value Date    PROT 7.1 12/08/2020    PROT 8.1 10/10/2020    LABALBU 4.3 12/08/2020    LABALBU 4.1 10/10/2020 BILIDIR 0.11 12/08/2020    BILIDIR 0.10 10/10/2020    IBILI 0.21 12/08/2020    IBILI 0.19 10/10/2020    BILITOT 0.32 12/08/2020    BILITOT 0.29 10/10/2020    ALKPHOS 61 12/08/2020    ALKPHOS 61 10/10/2020    ALT 51 12/08/2020    ALT 30 10/10/2020    AST 39 12/08/2020    AST 25 10/10/2020     No results found for: RPR  No results found for: HIV  No results found for: [facility]  Lab Results   Component Value Date    RBC 5.32 12/04/2020    WBC 4.5 12/04/2020     Lab Results   Component Value Date    CREATININE 0.94 12/04/2020    GLUCOSE 121 11/09/2020   you for allowing us to participate in the care of this patient. Please call with questions. Susana Medrano MD  - Office: [phone]    Please note that this chart was generated using voice recognition Dragon dictation software. Although every effort was made to ensure the accuracy of this automated transcription, some errors in transcription mayhave occurred.

## 2021-01-07 ENCOUNTER — TELEPHONE (OUTPATIENT)
Dept: INFECTIOUS DISEASES | Age: 21
End: 2021-01-07

## 2021-01-21 ENCOUNTER — HOSPITAL ENCOUNTER (OUTPATIENT)
Dept: CT IMAGING | Age: 21
Discharge: HOME | End: 2021-01-23
Payer: COMMERCIAL

## 2021-01-21 ENCOUNTER — HOSPITAL ENCOUNTER (OUTPATIENT)
Age: 21
Discharge: HOME | End: 2021-01-21
Payer: COMMERCIAL

## 2021-01-21 DIAGNOSIS — B39.2: ICD-10-CM

## 2021-01-21 PROCEDURE — 86698 HISTOPLASMA ANTIBODY: CPT

## 2021-01-21 PROCEDURE — 36415 COLL VENOUS BLD VENIPUNCTURE: CPT

## 2021-01-21 PROCEDURE — 71260 CT THORAX DX C+: CPT

## 2021-01-21 PROCEDURE — 6360000004 HC RX CONTRAST MEDICATION: Performed by: INTERNAL MEDICINE

## 2021-01-21 PROCEDURE — 80189 DRUG ASSAY ITRACONAZOLE: CPT

## 2021-01-21 PROCEDURE — 87385 HISTOPLASMA CAPSUL AG IA: CPT

## 2021-01-21 RX ADMIN — IOPAMIDOL 75 ML: 755 INJECTION, SOLUTION INTRAVENOUS at 14:29

## 2021-01-25 ENCOUNTER — TELEPHONE (OUTPATIENT)
Dept: INFECTIOUS DISEASES | Age: 21
End: 2021-01-25

## 2021-01-25 LAB
HISTOPLASMA GALACTOMANNAN AG, URINE: NOT DETECTED NG/ML
INTERPRETATION: NOT DETECTED

## 2021-01-25 NOTE — TELEPHONE ENCOUNTER
Receive a PA notice for ITRACONAZOLE. This was processed with covermymeds. Archivedtoday  Outcome   Approvedtoday   CaseId:[id number];Status:Approved; Review Type:Prior Auth; Coverage Start Date:12/26/2020; Coverage End Date:07/24/2021;   DrugItraconazole 100MG capsules   FormExpress Scripts Electronic PA Form   Original Claim InfoMR

## 2021-01-26 DIAGNOSIS — R91.8 PULMONARY NODULES: ICD-10-CM

## 2021-01-26 DIAGNOSIS — B39.9 HISTOPLASMOSIS: Primary | ICD-10-CM

## 2021-01-27 LAB
HISTOPLASMA ABS, ID: DETECTED
HISTOPLASMA ANTIBODY MYCELIAL CF: NORMAL
HISTOPLASMA ANTIBODY YEAST CF: NORMAL
HYDROXYITRACONAZOLE LEVEL: 0.5 UG/ML
ITRACONAZOLE LVL: 0.4 UG/ML

## 2021-01-29 ENCOUNTER — VIRTUAL VISIT (OUTPATIENT)
Dept: PULMONOLOGY | Age: 21
End: 2021-01-29
Payer: COMMERCIAL

## 2021-01-29 DIAGNOSIS — J45.20 MILD INTERMITTENT ASTHMA WITHOUT COMPLICATION: ICD-10-CM

## 2021-01-29 DIAGNOSIS — B39.4 HISTOPLASMA CAPSULATUM INFECTION: ICD-10-CM

## 2021-01-29 DIAGNOSIS — R91.8 MULTIPLE LUNG NODULES ON CT: Primary | ICD-10-CM

## 2021-01-29 DIAGNOSIS — R78.81 STREPTOCOCCAL BACTEREMIA: ICD-10-CM

## 2021-01-29 DIAGNOSIS — B95.5 STREPTOCOCCAL BACTEREMIA: ICD-10-CM

## 2021-01-29 PROCEDURE — G8427 DOCREV CUR MEDS BY ELIG CLIN: HCPCS | Performed by: INTERNAL MEDICINE

## 2021-01-29 PROCEDURE — 99214 OFFICE O/P EST MOD 30 MIN: CPT | Performed by: INTERNAL MEDICINE

## 2021-01-29 PROCEDURE — G8417 CALC BMI ABV UP PARAM F/U: HCPCS | Performed by: INTERNAL MEDICINE

## 2021-01-29 PROCEDURE — G8484 FLU IMMUNIZE NO ADMIN: HCPCS | Performed by: INTERNAL MEDICINE

## 2021-01-29 PROCEDURE — 4004F PT TOBACCO SCREEN RCVD TLK: CPT | Performed by: INTERNAL MEDICINE

## 2021-01-29 RX ORDER — ITRACONAZOLE 100 MG/1
CAPSULE ORAL
Status: ON HOLD | COMMUNITY
Start: 2021-01-24 | End: 2022-01-31 | Stop reason: HOSPADM

## 2021-01-29 ASSESSMENT — SLEEP AND FATIGUE QUESTIONNAIRES
HOW LIKELY ARE YOU TO NOD OFF OR FALL ASLEEP WHILE SITTING AND TALKING TO SOMEONE: 0
ESS TOTAL SCORE: 2
HOW LIKELY ARE YOU TO NOD OFF OR FALL ASLEEP WHILE LYING DOWN TO REST IN THE AFTERNOON WHEN CIRCUMSTANCES PERMIT: 1
HOW LIKELY ARE YOU TO NOD OFF OR FALL ASLEEP WHEN YOU ARE A PASSENGER IN A CAR FOR AN HOUR WITHOUT A BREAK: 0
HOW LIKELY ARE YOU TO NOD OFF OR FALL ASLEEP WHILE WATCHING TV: 1

## 2021-01-29 NOTE — PROGRESS NOTES
OUTPATIENT PULMONARY CONSULT NOTE    Telehealth visit   Doxy doxy. me visit     Patient:  Ángel Reed  MRN: [id number]    Consulting Physician: Dr Rajiv Cortes  Reason for Consult: Multiple lung nodules  Primacy Care Physician: Raya Fox    HISTORY OF PRESENT ILLNESS:   The patient is a 23 y.o. male   He has history of mild intermittent asthma otherwise no significant past medical history. Initially he was admitted to the hospital in September when he was found to have streptococcal bacteremia he was seen by infectious disease he was treated with Rocephin and then he was discharged apparently he was readmitted to the hospital with the relapsing fever and he had received prolonged course of antibiotic was discharged apparently on PICC line and home antibiotic. He had CT scan of the chest done on 1010/20 and he had multiple lung nodules of varying sizes which apparently were increased in size from 09/22/2020 also shows mild mediastinal and hilar adenopathy. At that time he was thought to have septic pulmonary emboli because of bacteremia and he had followed by infectious disease somewhere along the way he had histoplasma antibodies done because of lung nodules found to have histoplasma antibody positive. His histoplasma antigen was negative and getting histoplasma galactomannan was negative. He was started on treatment apparently in late October or November. He had 2 CT scans done since then on 11/09/2020 which showed similar appearing multiple lung nodule and also prominent lymphoid tissue in subcarinal and hilar regions and he had CT scan on 11/30/2020 which shows a similar appearing lung nodules with prominent mediastinal hilar lymph node which were unchanged and nonspecific. He has been followed by Dr. Rosalba Rodriguez and continued on itraconazole apparently for little over 2 months now and a CT scan on 01/21/2021 was done which according to radiology showed multiple bilateral pulmonary nodules increase in size and number from CT scan in 11/30/2020 all the adenopathy had improved. He had HIV test done in September which was negative. He apparently also had an echo/DANE which was negative for vegetation. He has been feeling good. He denies cough denies shortness of breath denies sputum production. He denies fever night sweats chills or hemoptysis. He has good appetite denies loss of appetite denies loss of weight   He denies shortness of breath he is able to do his regular activities and claims that he is active. He denies any chest pain pleuritic pain. He denies orthopnea PND or pedal edema. He does not complain of swelling of the joints skin rash or stiffness of joints. He denies hematuria dysuria.  [DISCONTINUED] FLUoxetine (PROZAC) 40 MG capsule take 1 capsule by mouth once daily      [DISCONTINUED] OLANZapine (ZYPREXA) 5 MG tablet Take 5 mg by mouth nightly       No facility-administered encounter medications on file as of 1/29/2021. Social History:   TOBACCO:   reports that he has been smoking cigarettes. He has been smoking about 0.25 packs per day. He has never used smokeless tobacco.  ETOH:   reports previous alcohol use. OCCUPATION:      Family History:       Problem Relation Age of Onset    Stroke Mother        Immunizations: There is no immunization history on file for this patient.       REVIEW OF SYSTEMS:  CONSTITUTIONAL:  negative for  fevers, chills, sweats, fatigue, anorexia, and weight loss  EYES:  negative for  double vision, blurred vision, dry eyes, eye discharge, visual disturbance, redness, and icterus  HEENT:  negative for  hearing loss, tinnitus, ear drainage, earaches, nasal congestion, epistaxis, sore throat, hoarseness, voice change, and postnasal drip  RESPIRATORY:  negative for  dry cough, cough with sputum, dyspnea, wheezing, hemoptysis, chest pain, and pleuritic pain  CARDIOVASCULAR:  negative for  chest pain, dyspnea, palpitations, orthopnea, PND, exertional chest pressure/discomfort, fatigue, edema, syncope  GASTROINTESTINAL: Positive for reflux, negative for nausea, vomiting, diarrhea, constipation, abdominal pain, abdominal mass, abdominal distention, jaundice, dysphagia, odynophagia, hematemesis, and hemtochezia  GENITOURINARY:  negative for frequency, dysuria, nocturia, and hematuria  HEMATOLOGIC/LYMPHATIC:  negative for easy bruising, bleeding, lymphadenopathy, and petechiae  ALLERGIC/IMMUNOLOGIC:  negative for recurrent infections, urticaria, hay fever, angioedema, anaphylaxis, and drug reactions  ENDOCRINE:  negative for heat intolerance, cold intolerance, tremor, and weight changes MUSCULOSKELETAL:  negative for  myalgias, arthralgias, joint swelling, stiff joints, and muscle weakness  NEUROLOGICAL:  negative for headaches, dizziness, seizures, memory problems, speech problems, visual disturbance, gait problems, tremor, dysphagia, weakness, numbness, syncope, and tingling  BEHAVIOR/PSYCH:  negative for decreased sleep, decreased energy level, increased energy level, poor concentration, depressed mood, and anxiety          Physical Exam:    Vitals: There were no vitals taken for this visit. Last 3 weights: Wt Readings from Last 3 Encounters:   01/04/21 209 lb (94.8 kg)   10/16/20 198 lb (89.8 kg)   10/15/20 198 lb (89.8 kg)     There is no height or weight on file to calculate BMI. Physical Examination:   General appearance - alert, well appearing, and in no distress, overweight and acyanotic, in no respiratory distress  Mental status - alert, oriented to person, place, and time  Eyes - pupils equal and reactive, extraocular eye movements intact  Ears - not examined  Nose - not examined  Mouth - not examined  Neck - not examined  Chest - no tachypnea, retractions or cyanosis bilateral symmetrical chest movement, normal resonance on percussion, air entry is present bilaterally and symmetrical, no expiratory wheezing rhonchi or crackles.   Heart - not examined  Abdomen - not examined  Neurological - alert, oriented, normal speech, no focal findings or movement disorder noted  Extremities - not examined  Skin -not examined      LABS:    CBC:   WBC   Date Value Ref Range Status   12/04/2020 4.5 3.5 - 11.0 k/uL Final   11/30/2020 5.0 3.5 - 11.0 k/uL Final   11/23/2020 4.2 3.5 - 11.0 k/uL Final     Hemoglobin   Date Value Ref Range Status   12/04/2020 14.4 13.5 - 17.5 g/dL Final   11/30/2020 14.5 13.5 - 17.5 g/dL Final   11/23/2020 13.3 (L) 13.5 - 17.5 g/dL Final     Platelets   Date Value Ref Range Status   12/04/2020 208 150 - 450 k/uL Final   11/30/2020 207 150 - 450 k/uL Final BNP: No results found for: BNP  Lipids: No results found for: CHOL, HDL    INR:   INR   Date Value Ref Range Status   10/12/2020 1.1  Final     Comment:           Therapeutic Range: Moderate Anticoagulant Intensity:     INR = 2.0-3.0   High Anticoagulant Intensity:     INR = 2.5-3.5             Thyroid: No results found for: TSH  Urinalysis: No results found for: BACTERIA, BLOODU, CLARITYU, COLORU, PHUR, PROTEINU, RBCUA, SPECGRAV, BILIRUBINUR, NITRU, WBCUA, LEUKOCYTESUR, GLUCOSEU  Cultures:-  -----------------------------------------------------------------    ABGs: No results found for: PHART, PO2ART, MQG1EJZ    Pulmonary Functions Testing Results:    No results found for: FEV1, FVC, MMI1KSI, TLC, DLCO    CXR  09/27/20  Subtle left basilar opacity.  No effusion or pneumothorax. The   cardiomediastinal silhouette is normal.  The osseous structures are intact   without acute process. CT Scans    CT scan Chest 01/21/21    Mediastinum: No cardiomegaly.  No pericardial effusion.  Previously seen   mediastinal and hilar lymph nodes have decreased in size.  No definite hilar   adenopathy.  Prominent mediastinal lymph nodes which are improved.       Lungs: No pleural effusions.  No focal consolidation.  Multiple bilateral   pulmonary nodules have increased in size and number.  For example within the   left upper lobe series 4, image 31 there is a 6 mm nodule.  Within the right   lower lobe there is a 6 mm nodule series 4, image 53.  Within the right   middle lobe there is a 5.5 mm nodule series 4, image 60.       Bones: No osseous abnormality. CT scan images reviewed from 10/10/2020, 11/9/2020, 11/30/2020 and 01/21/2021 which showed multiple lung nodules bilaterally most of the lung nodules are very small and it is very difficult to compare 1 CT scan from another CT scan in shorter period of the time and looking at CT scan from October 2020 comparing to January 2021 very difficult to say that there is much change of the nodules or persistent in bilateral.      DANE/ECHO: 09/26/2020  The study was performed by the Cardiologist, Cardiac Fellow, and the  Sonographer in the Cath Lab without complications. The patient tolerated the procedure well. Conscious sedation was used. Normal left and right ventricular size. Estimated ejection fraction is 55-60%. No valvular vegetations or thrombus was identified. Assessment and Plan       ICD-10-CM    1. Multiple lung nodules on CT  R91.8    2. 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 Mild intermittent asthma without complication  O34.61          Assessment:    He has history of streptococcal bacteremia was treated with prolonged antibiotic therapy. In second week of October he has histoplasma antibody positive although his histoplasma antigen and galactomannan were negative on subsequent testing. He was started on treatment with itraconazole apparently for last approximately 2 months on review of the left his histoplasma titer has gone down and histoplasma antigen remain negative. His CRP has progressively gone down also and has normalized last 3 times. His itraconazole level has been low twice in December and in January. He is been feeling good he has no symptoms and on last CT scan his small subcarinal and few mediastinal lymph node had improved also. I have reviewed the CT scan images available from 10/10/2020, 11/9/2020, 11/30/2020 and 01/21/2021 which showed multiple lung nodules bilaterally most of the lung nodules are very small and it is very difficult to compare 1 CT scan from another CT scan in shorter period of the time and looking at CT scan from October 2020 comparing to January 2021 very difficult to say that there is much change of the nodules or persistent in bilateral.  He was offered to have bronchoscopy in the past which was scheduled and canceled by the patient as he did not want bronchoscopy. Nodules are too small to access with a bronchoscopy and will ask interventional radiology. They can look at the CT scan and if one of the nodule the peripheral one is large enough for them to try to get a sample/biopsy. Overall he has been feeling well he does not have any symptoms and unlikely to have active histoplasma infection. I have noted that Dr. Iris Packer have plan for itraconazole for 6 months. I will order immune deficiency work-up with immunoglobulins, repeat CRP CBC with manual differential and LFTs as he is on a terconazole. Plan and recommendation:    Interventional radiology to review the CT scan. If possible CT-guided biopsy of one of the peripheral nodule. We will check immunoglobulins level. CBC with manual differential and LFTs. Check CRP. Dr. Ela Persaud had requested CT scan of the abdomen pelvis in the past but it was not done advised to proceed with CT scan of the abdomen and pelvis. Will discuss with     Vaccinations recommended for flu annually in fall  Up to date with vaccinations from pulm perspective     Questions answered pertaining to diagnosis and management explained importance of compliance with therapy     It was my pleasure to evaluate Ike Joshi today. Please call with questions. Please note that this chart was generated using voice recognition Dragon dictation software. Although every effort was made to ensure the accuracy of this automated transcription, some errors in transcription may have occurred. Jenny Lara MD             1/29/2021, 1:39 PM    Ike Joshi is a 23 y.o. male being evaluated by a Virtual Visit (video/telephone visit) encounter to address concerns as mentioned above. A caregiver was present when appropriate. Due to this being a TeleHealth encounter (During LNDSS-14 public health emergency), evaluation of the following organ systems was limited: Vitals/Constitutional/EENT/Resp/CV/GI//MS/Neuro/Skin/Heme-Lymph-Imm. Pursuant to the emergency declaration under the [address] [place] [address] authority and the [employer] Act, this Virtual Visit was conducted with patient's (and/or legal guardian's) consent, to reduce the patient's risk of exposure to COVID-19 and provide necessary medical care. The patient (and/or legal guardian) has also been advised to contact this office for worsening conditions or problems, and seek emergency medical treatment and/or call 911 if deemed necessary. Patient identification was verified at the start of the visit: Yes  Total time spent for this encounter: 45 mnutes    Services were provided through a video/telephone synchronous discussion virtually to substitute for in-person clinic visit. Patient and provider were located at their individual locations at home and office respectively. --Jenny Lara MD on 1/29/2021 at 8:33 PM    An electronic signature was used to authenticate this note.

## 2021-02-01 ENCOUNTER — OFFICE VISIT (OUTPATIENT)
Dept: INFECTIOUS DISEASES | Age: 21
End: 2021-02-01
Payer: COMMERCIAL

## 2021-02-01 VITALS
DIASTOLIC BLOOD PRESSURE: 89 MMHG | HEART RATE: 81 BPM | TEMPERATURE: 97.8 F | WEIGHT: 218 LBS | SYSTOLIC BLOOD PRESSURE: 143 MMHG | BODY MASS INDEX: 34.14 KG/M2 | OXYGEN SATURATION: 99 %

## 2021-02-01 DIAGNOSIS — B39.9 HISTOPLASMOSIS: Primary | ICD-10-CM

## 2021-02-01 DIAGNOSIS — R91.1 PULMONARY NODULE: ICD-10-CM

## 2021-02-01 PROCEDURE — G8427 DOCREV CUR MEDS BY ELIG CLIN: HCPCS | Performed by: INTERNAL MEDICINE

## 2021-02-01 PROCEDURE — G8417 CALC BMI ABV UP PARAM F/U: HCPCS | Performed by: INTERNAL MEDICINE

## 2021-02-01 PROCEDURE — 99213 OFFICE O/P EST LOW 20 MIN: CPT | Performed by: INTERNAL MEDICINE

## 2021-02-01 PROCEDURE — 4004F PT TOBACCO SCREEN RCVD TLK: CPT | Performed by: INTERNAL MEDICINE

## 2021-02-01 PROCEDURE — G8484 FLU IMMUNIZE NO ADMIN: HCPCS | Performed by: INTERNAL MEDICINE

## 2021-02-01 ASSESSMENT — ENCOUNTER SYMPTOMS
EYE DISCHARGE: 0
APNEA: 0
ABDOMINAL DISTENTION: 0
COLOR CHANGE: 0
CHOKING: 0

## 2021-02-01 NOTE — PROGRESS NOTES
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 Significance -9/22/20  · DANE neg 9/26/20 - teeth good  · All repeat BC neg - no ivdu  · F responded on ceftriaxone gent 10 days, relapsed on ceftriaxone alone -Ct worse 10/10/2020  · GC/ HIV/ chlamydia all neg - EBV inactive  · vanco / cefepime 10/12/20 - declined Bronch - fever/ low elevation CRP maintained till 10/21 LGF, the resolved - CT chest 11/9/20 much smaller nodules -vanco trough 10-12 always  · 11/9/20 increase vanco trough between 14-17 to improve response - DW pt risk on kidneys through small  · 10/2020 titers high for histo - serum AG neg  · 12/420 itraconazole 200 mg daily  · 12/4 histo titers down by half  · 12/4 CT chest lesions had increased then stabilized    Recommendations   Histoplasma AB 10/2020 1/32 and 1/512  Repeat early itra tx was already better 1/16 and 1/128  Serum AG was neg in blood - U test never ran. 2/1/21 - Post itra x 2 months  will add itra an extra 200 mg at noon-  Repeat itra level and labs mointhly  See Dr Miguelina Galvan for opinion in the pulm nodules, might be non histoplasma, asked for a pulm biopsy. Get histoplasma titers in 45 days  See me in 39 days    Saw DR Juan Leal and will ask for FU look for immuno def- FU pend     Diagnosis Orders   1. Histoplasmosis  Itraconazole Level    Itraconazole 200 MG TABS    Hepatic Function Panel    C-Reactive Protein   2. Pulmonary nodule  Itraconazole Level    Itraconazole 200 MG TABS    Hepatic Function Panel    C-Reactive Protein       Return in about 6 weeks (around 3/15/2021).       History of Present Illness:   Melody Beavers is a 23y.o.-year-old  male who presents with   Chief Complaint   Patient presents with    Follow-up     follow up for lung nodules. no issues   virtual visit 11/9/20  · Active pulmonary emboli bilat multilobar   · Fever 102, Sore throat from yelling 9/2020, no pharyngitis  · Failed po AB x 2 course  · BC + strep viridens x 1 and SNC x 1 [facility] - ? Significance -9/22/20  · DANE neg 9/26/20 - teeth good  · All repeat BC neg - no ivdu  · F responded on ceftriaxone gent 10 days, relapsed on ceftriaxone alone -Ct worse 10/10/2020  · GC/ HIV/ chlamydia all neg - EBV inactive  · vanco / cefepime started 10/12/20 - declined Bronch -offered [facility], never scheduled. · fever/ low elevation CRP maintained till 10/21 LGF, then resolved - CT chest 11/9/20 much smaller nodules -vanco trough 10-12 always  · 11/9/20 increase vanco trough between 14-17 to improve response - DW pt risk on kidneys through small    He feels good no abdominal pain no diarrhea tolerating the medication very well the PICC line has been clotted but it has been opened and it does not flush at this point. The patient is on saline flushes, will ask [place] to make it heparin flushes to avoid further clotting of the picc.     Pt happy no issues or complaints    Plan   · coram to increase the vanco trough 14-17- not less  · Start using the heparin flushes in the picc to avoid clotting   · See me in a month after a CT chest done then - pls schedule  · Keep same ceftriaxone and vanco x another 30 days till 12/9/20  · Same blood work creat and vanco 2 x per week and CRP cbc diff weekly till end of AB    Visit 1/4/21  Histo titers increased 10/2020 and dropped by half 12/4/20  conferred w [facility] ID and due to concern for diffuse pulm and mediastinal histo, started 100 mg bid itraconazole 12/4/20 and did well - histoplasma better  Level was 0.5- LFT ok  Feels great - no fever  Or sweats  reepat CT chest w contrast   Likely histo diffuse mediastinal and pulm -   To see DR Jersey Bolaños for ? underlying immuno deficiency - no appoint yet, weill facilitate. Histoplasma AB 10/2020 1/32 and 1/512  Repeat early itra tx was already better 1/16 and 1/128  Serum AG was neg in blood - U test never ran. Plan;  Plan 6 mon histo at least for now  Immunologist look for immuno def  Repeat histo titers  And U histo AG  itracon level  Ct chest w contrast  See in 1 month    Visit 2/1/21  On itra 2 months  Titers better and trough 0.4 but CT chest 1/21 shows increase in the size of the nodules but improved LN mediastinal and hilar. .  Pt to see Dr Maranda Maldonado and  U histo AG neg  Histo AB less, 1/16 and 1/132  Ran out of itra last week      will add itra an extra 200 mg at noon-  Repeat itra level and histo titers  See Dr Maranda Maldnoado for opinion in the pulm nodules, might be non histoplasma, asked for a pulm biopsy. Exam neg    I have personally reviewed the past medical history, past surgical history, medications, social history, and family history, and I haveupdated the database accordingly.   Past Medical History:     Past Medical History:   Diagnosis Date    Depression     Schizoaffective disorder ([facility] [address]        Past Surgical  History:     Past Surgical History:   Procedure Laterality Date    HC PICC LINE DOUBLE LUMEN  10/15/2020         WISDOM TOOTH EXTRACTION      While in high school       Medications:     Current Outpatient Medications:     Itraconazole 200 MG TABS, Take 200 mg by mouth 3 times daily, Disp: 90 tablet, Rfl: 2    FLUoxetine (PROZAC) 20 MG capsule, Take 20 mg by mouth daily, Disp: , Rfl:     hydrOXYzine (VISTARIL) 50 MG capsule, Take 50 mg by mouth every 6 hours as needed for Itching, Disp: , Rfl:     ibuprofen (ADVIL;MOTRIN) 400 MG tablet, Take 400 mg by mouth every 6 hours as needed for Pain, Disp: , Rfl:     itraconazole (SPORANOX) 100 MG capsule, , Disp: , Rfl:     OLANZapine (ZYPREXA) 10 MG tablet, take 1 tablet by mouth once daily, Disp: , Rfl:     albuterol sulfate HFA (VENTOLIN HFA) 108 (90 Base) MCG/ACT inhaler, Inhale 2 puffs into the lungs 4 times daily as needed for Wheezing, Disp: 1 Inhaler, Rfl: 0      Social History:     Social History     Socioeconomic History    Marital status: Single     Spouse name: Not on file    Number of children: Not on file    Years of education: Not on file    Highest education level: Not on file   Occupational History    Not on file   Social Needs    Financial resource strain: Not on file    Food insecurity     Worry: Not on file     Inability: Not on file    Transportation needs     Medical: Not on file     Non-medical: Not on file   Tobacco Use    Smoking status: Current Every Day Smoker     Packs/day: 0.25     Types: Cigarettes    Smokeless tobacco: Never Used   Substance and Sexual Activity    Alcohol use: Not Currently    Drug use: Not on file    Sexual activity: Not on file   Lifestyle    Physical activity     Days per week: Not on file     Minutes per session: Not on file    Stress: Not on file   Relationships    Social connections     Talks on phone: Not on file     Gets together: Not on file     Attends Jewish service: Not on file     Active member of club or organization: Not on file     Attends meetings of clubs or organizations: Not on file     Relationship status: Not on file    Intimate partner violence     Fear of current or ex partner: Not on file     Emotionally abused: Not on file     Physically abused: Not on file     Forced sexual activity: Not on file   Other Topics Concern    Not on file   Social History Narrative    Not on file       Family History:     Family History   Problem Relation Age of Onset    Stroke Mother         Allergies:   Patient has no known allergies. Review of Systems:   Review of Systems   Constitutional: Negative for activity change and appetite change. HENT: Negative for congestion. Eyes: Negative for discharge. Respiratory: Negative for apnea and choking. Cardiovascular: Negative for chest pain. Gastrointestinal: Negative for abdominal distention. Endocrine: Negative for heat intolerance. Genitourinary: Negative for dysuria and flank pain. Musculoskeletal: Negative for arthralgias. Skin: Negative for color change. Allergic/Immunologic: Negative for immunocompromised state. Neurological: Negative for dizziness. Hematological: Negative for adenopathy. Psychiatric/Behavioral: Negative for agitation. Physical Examination :   Blood pressure (!) 143/89, pulse 81, temperature 97.8 °F (36.6 °C), weight 218 lb (98.9 kg), SpO2 99 %. Physical Exam  Constitutional:       General: He is not in acute distress. Appearance: Normal appearance. He is not ill-appearing. HENT:      Head: Normocephalic and atraumatic. Nose: Nose normal.      Mouth/Throat:      Mouth: Mucous membranes are moist.   Eyes:      General: No scleral icterus. Conjunctiva/sclera: Conjunctivae normal.   Neck:      Musculoskeletal: Neck supple. No neck rigidity or muscular tenderness. Cardiovascular:      Rate and Rhythm: Normal rate and regular rhythm. Heart sounds: Normal heart sounds. No murmur. No friction rub. Pulmonary:      Effort: No respiratory distress. Breath sounds: Normal breath sounds. Abdominal:      General: There is no distension. Palpations: Abdomen is soft. Genitourinary:     Comments: No steel  Musculoskeletal:         General: No swelling or deformity. Skin:     General: Skin is dry. Coloration: Skin is not jaundiced. Neurological:      General: No focal deficit present. Mental Status: He is alert. Psychiatric:         Mood and Affect: Mood normal.         Thought Content:  Thought content normal.           Medical Decision Making:   I have independently reviewed/ordered the following labs:    CBCwith Differential:   Lab Results   Component Value Date    WBC 4.5 12/04/2020    WBC 5.0 11/30/2020    HGB 14.4 12/04/2020    HGB 14.5 11/30/2020    HCT 42.0 12/04/2020    HCT 42.9 11/30/2020     12/04/2020     11/30/2020    LYMPHOPCT 31 11/23/2020    LYMPHOPCT 39 10/22/2020    MONOPCT 20 11/23/2020    MONOPCT 10 10/22/2020     BMP:  Lab Results   Component Value Date     11/09/2020     11/05/2020    K 4.3 11/09/2020    K 4.2 11/05/2020     11/09/2020     11/05/2020    CO2 23 11/09/2020    CO2 25 11/05/2020    BUN 13 12/04/2020    BUN 13 11/30/2020    CREATININE 0.94 12/04/2020    CREATININE 0.93 11/30/2020     Hepatic Function Panel:   Lab Results   Component Value Date    PROT 7.1 12/08/2020    PROT 8.1 10/10/2020    LABALBU 4.3 12/08/2020    LABALBU 4.1 10/10/2020    BILIDIR 0.11 12/08/2020    BILIDIR 0.10 10/10/2020    IBILI 0.21 12/08/2020    IBILI 0.19 10/10/2020    BILITOT 0.32 12/08/2020    BILITOT 0.29 10/10/2020    ALKPHOS 61 12/08/2020    ALKPHOS 61 10/10/2020    ALT 51 12/08/2020    ALT 30 10/10/2020    AST 39 12/08/2020    AST 25 10/10/2020     No results found for: RPR  No results found for: HIV  No results found for: [facility]  Lab Results   Component Value Date    RBC 5.32 12/04/2020    WBC 4.5 12/04/2020     Lab Results   Component Value Date    CREATININE 0.94 12/04/2020    GLUCOSE 121 11/09/2020   you for allowing us to participate in the care of this patient. Please call with questions. Claressa Soulier, MD  - Office: [phone]    Please note that this chart was generated using voice recognition Dragon dictation software. Although every effort was made to ensure the accuracy of this automated transcription, some errors in transcription mayhave occurred.

## 2021-02-06 ENCOUNTER — HOSPITAL ENCOUNTER (OUTPATIENT)
Dept: LAB | Age: 21
Setting detail: SPECIMEN
Discharge: HOME | End: 2021-02-06
Payer: COMMERCIAL

## 2021-02-06 DIAGNOSIS — Z01.818 PREOP TESTING: Primary | ICD-10-CM

## 2021-02-06 PROCEDURE — U0005 INFEC AGEN DETEC AMPLI PROBE: HCPCS

## 2021-02-06 PROCEDURE — U0003 INFECTIOUS AGENT DETECTION BY NUCLEIC ACID (DNA OR RNA); SEVERE ACUTE RESPIRATORY SYNDROME CORONAVIRUS 2 (SARS-COV-2) (CORONAVIRUS DISEASE [COVID-19]), AMPLIFIED PROBE TECHNIQUE, MAKING USE OF HIGH THROUGHPUT TECHNOLOGIES AS DESCRIBED BY CMS-2020-01-R: HCPCS

## 2021-02-08 LAB
SARS-COV-2, RAPID: NORMAL
SARS-COV-2: NORMAL
SARS-COV-2: NOT DETECTED
SOURCE: NORMAL

## 2021-02-09 ENCOUNTER — TELEPHONE (OUTPATIENT)
Dept: PRIMARY CARE CLINIC | Age: 21
End: 2021-02-09

## 2021-02-09 ENCOUNTER — PREP FOR PROCEDURE (OUTPATIENT)
Dept: GENERAL RADIOLOGY | Age: 21
End: 2021-02-09

## 2021-02-09 RX ORDER — SODIUM CHLORIDE 9 MG/ML
INJECTION, SOLUTION INTRAVENOUS CONTINUOUS
Status: CANCELLED | OUTPATIENT
Start: 2021-02-09

## 2021-02-10 ENCOUNTER — HOSPITAL ENCOUNTER (OUTPATIENT)
Dept: GENERAL RADIOLOGY | Age: 21
Discharge: HOME | End: 2021-02-12
Payer: COMMERCIAL

## 2021-02-10 ENCOUNTER — HOSPITAL ENCOUNTER (OUTPATIENT)
Dept: CT IMAGING | Age: 21
Discharge: HOME | End: 2021-02-12
Payer: COMMERCIAL

## 2021-02-10 VITALS
HEART RATE: 75 BPM | BODY MASS INDEX: 33.74 KG/M2 | OXYGEN SATURATION: 95 % | TEMPERATURE: 97.6 F | SYSTOLIC BLOOD PRESSURE: 123 MMHG | WEIGHT: 215 LBS | DIASTOLIC BLOOD PRESSURE: 77 MMHG | HEIGHT: 67 IN | RESPIRATION RATE: 14 BRPM

## 2021-02-10 DIAGNOSIS — R91.8 LUNG MASS: ICD-10-CM

## 2021-02-10 DIAGNOSIS — R91.8 MULTIPLE LUNG NODULES ON CT: ICD-10-CM

## 2021-02-10 LAB
INR BLD: 0.9
PARTIAL THROMBOPLASTIN TIME: 25.5 SEC (ref 20.5–30.5)
PLATELET # BLD: 244 K/UL (ref 138–453)
PROTHROMBIN TIME: 10.1 SEC (ref 9.1–12.3)

## 2021-02-10 PROCEDURE — 88333 PATH CONSLTJ SURG CYTO XM 1: CPT

## 2021-02-10 PROCEDURE — 85049 AUTOMATED PLATELET COUNT: CPT

## 2021-02-10 PROCEDURE — 88313 SPECIAL STAINS GROUP 2: CPT

## 2021-02-10 PROCEDURE — 7100000011 HC PHASE II RECOVERY - ADDTL 15 MIN

## 2021-02-10 PROCEDURE — 85610 PROTHROMBIN TIME: CPT

## 2021-02-10 PROCEDURE — 2709999900 HC NON-CHARGEABLE SUPPLY

## 2021-02-10 PROCEDURE — 6360000002 HC RX W HCPCS: Performed by: RADIOLOGY

## 2021-02-10 PROCEDURE — 2580000003 HC RX 258: Performed by: PHYSICIAN ASSISTANT

## 2021-02-10 PROCEDURE — 77012 CT SCAN FOR NEEDLE BIOPSY: CPT

## 2021-02-10 PROCEDURE — 88305 TISSUE EXAM BY PATHOLOGIST: CPT

## 2021-02-10 PROCEDURE — 7100000010 HC PHASE II RECOVERY - FIRST 15 MIN

## 2021-02-10 PROCEDURE — 88334 PATH CONSLTJ SURG CYTO XM EA: CPT

## 2021-02-10 PROCEDURE — 85730 THROMBOPLASTIN TIME PARTIAL: CPT

## 2021-02-10 PROCEDURE — 2709999900 CT NEEDLE BIOPSY LUNG PERCUTANEOUS W IMAGING GUIDANCE

## 2021-02-10 PROCEDURE — 71045 X-RAY EXAM CHEST 1 VIEW: CPT

## 2021-02-10 RX ORDER — FENTANYL CITRATE 50 UG/ML
INJECTION, SOLUTION INTRAMUSCULAR; INTRAVENOUS
Status: COMPLETED | OUTPATIENT
Start: 2021-02-10 | End: 2021-02-10

## 2021-02-10 RX ORDER — SODIUM CHLORIDE 9 MG/ML
INJECTION, SOLUTION INTRAVENOUS CONTINUOUS
Status: DISCONTINUED | OUTPATIENT
Start: 2021-02-10 | End: 2021-02-13 | Stop reason: HOSPADM

## 2021-02-10 RX ORDER — ACETAMINOPHEN 325 MG/1
650 TABLET ORAL EVERY 4 HOURS PRN
Status: DISCONTINUED | OUTPATIENT
Start: 2021-02-10 | End: 2021-02-13 | Stop reason: HOSPADM

## 2021-02-10 RX ORDER — MIDAZOLAM HYDROCHLORIDE 2 MG/2ML
INJECTION, SOLUTION INTRAMUSCULAR; INTRAVENOUS
Status: COMPLETED | OUTPATIENT
Start: 2021-02-10 | End: 2021-02-10

## 2021-02-10 RX ADMIN — MIDAZOLAM HYDROCHLORIDE 1 MG: 1 INJECTION, SOLUTION INTRAMUSCULAR; INTRAVENOUS at 10:28

## 2021-02-10 RX ADMIN — FENTANYL CITRATE 50 MCG: 50 INJECTION INTRAMUSCULAR; INTRAVENOUS at 10:28

## 2021-02-10 RX ADMIN — SODIUM CHLORIDE: 9 INJECTION, SOLUTION INTRAVENOUS at 08:40

## 2021-02-10 ASSESSMENT — PAIN SCALES - GENERAL
PAINLEVEL_OUTOF10: 0

## 2021-02-10 NOTE — BRIEF OP NOTE
Brief Postoperative Note    Vianey Bird  YOB: 2000  [id number]    Pre-operative Diagnosis: Lung nodules    Post-operative Diagnosis: Same    Procedure: Lung bx    Anesthesia: Local and Moderate Sedation    Surgeons/Assistants: Carlos Enrique Nicolas    Estimated Blood Loss: less than 50     Complications: None    Specimens: Was Obtained:     Findings: Successful lower right lobe lung nodule bx.  5 core biopsies obtained.      Electronically signed by VU Holguin on 2/10/2021 at 10:51 AM

## 2021-02-10 NOTE — POST SEDATION
Sedation Post Procedure Note    Patient Name: Yany Colon   YOB: 2000  Room/Bed: Room/bed info not found  Medical Record Number: [id number]  Date: 2/10/2021   Time: 10:52 AM         Physicians/Assistants: VU Morales    Procedure Performed:  Lung bx    Post-Sedation Vital Signs:  Vitals:    02/10/21 1049   BP: 138/67   Pulse: 75   Resp: 20   Temp:    SpO2: 97%      Vital signs were reviewed and were stable after the procedure (see flow sheet for vitals)            Post-Sedation Exam: Pt remains stable           Complications: none    Electronically signed by VU Brooke on 2/10/2021 at 10:52 AM

## 2021-02-10 NOTE — H&P
use.  Marital Status single  Occupation none  Family History  Family Status   Relation Name Status    Mother  Alive    Father  Alive     family history includes Stroke in his mother. OBJECTIVE:   VITALS:  height is 5' 7\" (1.702 m) and weight is 215 lb (97.5 kg). His temporal temperature is 97.6 °F (36.4 °C). His blood pressure is 138/81 and his pulse is 66. His respiration is 16 and oxygen saturation is 97%. CONSTITUTIONAL:Alert and orientated to person, place and time. No acute distress. Friendly. Very pleasant. SKIN:  Warm & dry, no rashes on exposed skin  HEENT: HEAD: Normocephalic, atraumatic        EYES:  PERRL, EOMs intact, conjunctiva clear      EARS:  Equal bilaterally, no edema or thickening, skin is intact without lumps or lesions. No discharge. NOSE:  Nares patent, septum midline, no rhinorrhea, nasal piercing     MOUTH/THROAT:  Mucous membranes moist, tongue is pink, uvula midline, teeth appear to be intact  NECK:  Supple, no lymphadenopathy, full ROM  LUNGS: Respirations even and non-labored. Clear to auscultation bilaterally, no wheezes/rales/rhonchi   CARDIOVASCULAR: regular rate and rhythm, no murmurs/rubs/gallops   ABDOMEN: soft, non-tender, non-distended, bowel sounds active x 4   MUSCULOSKELETAL: Full ROM bilateral upper extremities, Full ROM bilateral lower extremities. Strength of 5/5 bilateral upper extremities. Strength 5/5 bilateral lower extremities. VASCULAR:  Brisk cap refill bilateral fingers. Radial pulses are intact, 2+ bilaterally. Dorsalis pedis pulse 2+ bilaterally. No edema or varicosities bilateral lower extremities  NEUROLOGIC: CN II-XII are grossly intact. Gait not assessed.      IMPRESSIONS:   Multiple lung nodules on CT      Diagnosis Date    Anxiety     Asthma     Depression     Histoplasmosis     following with Dr. Jean-Claude Santa Multiple lung nodules on CT     Schizoaffective disorder ([facility] [address]      PLANS:   IR CT lung biopsy     ALESSANDRO MCLAIN APRN-CNP  Electronically signed 2/10/2021 at 9:52 AM

## 2021-02-10 NOTE — PRE SEDATION
Sedation Pre-Procedure Note    Patient Name: Jacky Jackson   YOB: 2000  Room/Bed: Room/bed info not found  Medical Record Number: [id number]  Date: 2/10/2021   Time: 10:00AM     Indication:  Lung bx    Consent: I have discussed with the patient and/or the patient representative the indication, alternatives, and the possible risks and/or complications of the planned procedure and the anesthesia methods. The patient and/or patient representative appear to understand and agree to proceed. Vital Signs:   Vitals:    02/10/21 1046   BP: 138/69   Pulse: 67   Resp: 18   Temp:    SpO2: 97%       Past Medical History:   has a past medical history of Anxiety, Asthma, Depression, Histoplasmosis, Multiple lung nodules on CT, and Schizoaffective disorder ([facility] [address] Past Surgical History:   has a past surgical history that includes [place] tooth extraction and hc picc line double lumen (10/15/2020). Medications:   Scheduled Meds:   Continuous Infusions:    sodium chloride       PRN Meds:   Home Meds:   Prior to Admission medications    Medication Sig Start Date End Date Taking?  Authorizing Provider   Itraconazole 200 MG TABS Take 200 mg by mouth 3 times daily 2/1/21 3/3/21 Yes Dennard Skiff, MD   OLANZapine (ZYPREXA) 10 MG tablet take 1 tablet by mouth once daily 12/29/20  Yes Historical Provider, MD   FLUoxetine (PROZAC) 20 MG capsule Take 20 mg by mouth daily   Yes Historical Provider, MD   hydrOXYzine (VISTARIL) 50 MG capsule Take 50 mg by mouth every 6 hours as needed for Itching   Yes Historical Provider, MD   itraconazole (SPORANOX) 100 MG capsule  1/24/21   Historical Provider, MD   albuterol sulfate HFA (VENTOLIN HFA) 108 (90 Base) MCG/ACT inhaler Inhale 2 puffs into the lungs 4 times daily as needed for Wheezing 9/29/20   Margarita Welch MD   ibuprofen (ADVIL;MOTRIN) 400 MG tablet Take 400 mg by mouth every 6 hours as needed for Pain    Historical Provider, MD     Coumadin Use Last 7 Days: no  Antiplatelet drug therapy use last 7 days: no  Other anticoagulant use last 7 days: no  Additional Medication Information:  See med rec      Pre-Sedation Documentation and Exam:   I have personally completed a history, physical exam & review of systems for this patient (see notes).     Mallampati Airway Assessment:  Mallampati Class II - (soft palate, fauces & uvula are visible)    Prior History of Anesthesia Complications:   none    ASA Classification:  Class 2 - A normal healthy patient with mild systemic disease    Sedation/ Anesthesia Plan:   intravenous sedation    Medications Planned:   midazolam (Versed) intravenously and fentanyl intravenously    Patient is an appropriate candidate for plan of sedation: yes    Electronically signed by VU Phillips on 2/10/2021 at 10:50 AM

## 2021-02-12 DIAGNOSIS — B39.4 HISTOPLASMA CAPSULATUM INFECTION: Primary | ICD-10-CM

## 2021-02-12 LAB — SURGICAL PATHOLOGY REPORT: NORMAL

## 2021-02-17 ENCOUNTER — TELEPHONE (OUTPATIENT)
Dept: INFECTIOUS DISEASES | Age: 21
End: 2021-02-17

## 2021-02-17 DIAGNOSIS — B39.0 ACUTE PULMONARY HISTOPLASMOSIS (HCC): Primary | ICD-10-CM

## 2021-02-23 ENCOUNTER — HOSPITAL ENCOUNTER (OUTPATIENT)
Age: 21
Discharge: HOME | End: 2021-02-23
Payer: COMMERCIAL

## 2021-02-23 DIAGNOSIS — R78.81 STREPTOCOCCAL BACTEREMIA: ICD-10-CM

## 2021-02-23 DIAGNOSIS — B39.0 ACUTE PULMONARY HISTOPLASMOSIS (HCC): ICD-10-CM

## 2021-02-23 DIAGNOSIS — B39.9 HISTOPLASMOSIS: ICD-10-CM

## 2021-02-23 DIAGNOSIS — B39.4 HISTOPLASMA CAPSULATUM INFECTION: ICD-10-CM

## 2021-02-23 DIAGNOSIS — B95.5 STREPTOCOCCAL BACTEREMIA: ICD-10-CM

## 2021-02-23 DIAGNOSIS — R91.1 PULMONARY NODULE: ICD-10-CM

## 2021-02-23 LAB
ABSOLUTE EOS #: 0.06 K/UL (ref 0–0.4)
ABSOLUTE IMMATURE GRANULOCYTE: 0 K/UL (ref 0–0.3)
ABSOLUTE LYMPH #: 2.27 K/UL (ref 1–4.8)
ABSOLUTE MONO #: 0.38 K/UL (ref 0.1–0.8)
ALBUMIN SERPL-MCNC: 4.4 G/DL (ref 3.5–5.2)
ALBUMIN/GLOBULIN RATIO: 1.5 (ref 1–2.5)
ALP BLD-CCNC: 49 U/L (ref 40–129)
ALT SERPL-CCNC: 42 U/L (ref 5–41)
AST SERPL-CCNC: 32 U/L
BASOPHILS # BLD: 0 % (ref 0–2)
BASOPHILS ABSOLUTE: 0 K/UL (ref 0–0.2)
BILIRUB SERPL-MCNC: 0.18 MG/DL (ref 0.3–1.2)
BILIRUBIN DIRECT: <0.08 MG/DL
BILIRUBIN, INDIRECT: ABNORMAL MG/DL (ref 0–1)
C-REACTIVE PROTEIN: <3 MG/L (ref 0–5)
EOSINOPHILS RELATIVE PERCENT: 1 % (ref 1–4)
GLOBULIN: ABNORMAL G/DL (ref 1.5–3.8)
HCT VFR BLD CALC: 45.4 % (ref 40.7–50.3)
HEMOGLOBIN: 15.3 G/DL (ref 13–17)
IGA: 80 MG/DL (ref 70–400)
IGG: 1154 MG/DL (ref 700–1600)
IGM: 168 MG/DL (ref 40–230)
IMMATURE GRANULOCYTES: 0 %
LYMPHOCYTES # BLD: 36 % (ref 24–44)
MCH RBC QN AUTO: 26.5 PG (ref 25.2–33.5)
MCHC RBC AUTO-ENTMCNC: 33.7 G/DL (ref 28.4–34.8)
MCV RBC AUTO: 78.5 FL (ref 82.6–102.9)
MONOCYTES # BLD: 6 % (ref 1–7)
NRBC AUTOMATED: 0 PER 100 WBC
PDW BLD-RTO: 14.3 % (ref 11.8–14.4)
PLATELET # BLD: 228 K/UL (ref 138–453)
PLATELET ESTIMATE: ABNORMAL
PMV BLD AUTO: 11.3 FL (ref 8.1–13.5)
RBC # BLD: 5.78 M/UL (ref 4.21–5.77)
RBC # BLD: ABNORMAL 10*6/UL
SEG NEUTROPHILS: 57 % (ref 36–66)
SEGMENTED NEUTROPHILS ABSOLUTE COUNT: 3.59 K/UL (ref 1.8–7.7)
TOTAL PROTEIN: 7.3 G/DL (ref 6.4–8.3)
WBC # BLD: 6.3 K/UL (ref 3.5–11.3)
WBC # BLD: ABNORMAL 10*3/UL

## 2021-02-23 PROCEDURE — 83516 IMMUNOASSAY NONANTIBODY: CPT

## 2021-02-23 PROCEDURE — 85027 COMPLETE CBC AUTOMATED: CPT

## 2021-02-23 PROCEDURE — 86698 HISTOPLASMA ANTIBODY: CPT

## 2021-02-23 PROCEDURE — 82784 ASSAY IGA/IGD/IGG/IGM EACH: CPT

## 2021-02-23 PROCEDURE — 80189 DRUG ASSAY ITRACONAZOLE: CPT

## 2021-02-23 PROCEDURE — 85007 BL SMEAR W/DIFF WBC COUNT: CPT

## 2021-02-23 PROCEDURE — 80076 HEPATIC FUNCTION PANEL: CPT

## 2021-02-23 PROCEDURE — 86140 C-REACTIVE PROTEIN: CPT

## 2021-02-25 LAB
ANCA MYELOPEROXIDASE: 23 AU/ML
ANCA PROTEINASE 3: 16 AU/ML

## 2021-02-26 LAB
HYDROXYITRACONAZOLE LEVEL: 3.3 UG/ML
ITRACONAZOLE LVL: 1.7 UG/ML

## 2021-04-19 ENCOUNTER — TELEPHONE (OUTPATIENT)
Dept: INFECTIOUS DISEASES | Age: 21
End: 2021-04-19

## 2021-05-20 DIAGNOSIS — B39.9 HISTOPLASMOSIS: Primary | ICD-10-CM

## 2021-05-21 ENCOUNTER — TELEPHONE (OUTPATIENT)
Dept: INFECTIOUS DISEASES | Age: 21
End: 2021-05-21

## 2021-05-21 NOTE — TELEPHONE ENCOUNTER
Dr Aylin Noonan the office that the patient has yet to get labs and CT done. I called and left him a message, asking for a call back.

## 2021-05-25 NOTE — TELEPHONE ENCOUNTER
Patient called, he will go to a [facility] lab for his bloodwork and then I transferred him to [facility] to get the CT done. Writer will continue to track results.

## 2021-06-03 ENCOUNTER — TELEPHONE (OUTPATIENT)
Dept: INFECTIOUS DISEASES | Age: 21
End: 2021-06-03

## 2021-06-03 ENCOUNTER — HOSPITAL ENCOUNTER (OUTPATIENT)
Dept: CT IMAGING | Age: 21
Discharge: HOME | End: 2021-06-05
Payer: COMMERCIAL

## 2021-06-03 ENCOUNTER — HOSPITAL ENCOUNTER (OUTPATIENT)
Age: 21
Discharge: HOME | End: 2021-06-03
Payer: COMMERCIAL

## 2021-06-03 DIAGNOSIS — B39.9 HISTOPLASMOSIS: ICD-10-CM

## 2021-06-03 DIAGNOSIS — R91.1 PULMONARY NODULE: ICD-10-CM

## 2021-06-03 LAB
ABSOLUTE EOS #: 0.25 K/UL (ref 0–0.44)
ABSOLUTE IMMATURE GRANULOCYTE: <0.03 K/UL (ref 0–0.3)
ABSOLUTE LYMPH #: 3.32 K/UL (ref 1.1–3.7)
ABSOLUTE MONO #: 0.74 K/UL (ref 0.1–1.2)
BASOPHILS # BLD: 1 % (ref 0–2)
BASOPHILS ABSOLUTE: 0.08 K/UL (ref 0–0.2)
CREAT SERPL-MCNC: 0.85 MG/DL (ref 0.7–1.2)
DIFFERENTIAL TYPE: ABNORMAL
EOSINOPHILS RELATIVE PERCENT: 3 % (ref 1–4)
GFR AFRICAN AMERICAN: >60 ML/MIN
GFR NON-AFRICAN AMERICAN: >60 ML/MIN
GFR SERPL CREATININE-BSD FRML MDRD: NORMAL ML/MIN/{1.73_M2}
GFR SERPL CREATININE-BSD FRML MDRD: NORMAL ML/MIN/{1.73_M2}
HCT VFR BLD CALC: 48.1 % (ref 40.7–50.3)
HEMOGLOBIN: 15.9 G/DL (ref 13–17)
IMMATURE GRANULOCYTES: 0 %
LYMPHOCYTES # BLD: 44 % (ref 24–43)
MCH RBC QN AUTO: 26.5 PG (ref 25.2–33.5)
MCHC RBC AUTO-ENTMCNC: 33.1 G/DL (ref 28.4–34.8)
MCV RBC AUTO: 80.3 FL (ref 82.6–102.9)
MONOCYTES # BLD: 10 % (ref 3–12)
NRBC AUTOMATED: 0 PER 100 WBC
PDW BLD-RTO: 13.7 % (ref 11.8–14.4)
PLATELET # BLD: 240 K/UL (ref 138–453)
PLATELET ESTIMATE: ABNORMAL
PMV BLD AUTO: 11.4 FL (ref 8.1–13.5)
RBC # BLD: 5.99 M/UL (ref 4.21–5.77)
RBC # BLD: ABNORMAL 10*6/UL
SEG NEUTROPHILS: 42 % (ref 36–65)
SEGMENTED NEUTROPHILS ABSOLUTE COUNT: 3.22 K/UL (ref 1.5–8.1)
WBC # BLD: 7.6 K/UL (ref 3.5–11.3)
WBC # BLD: ABNORMAL 10*3/UL

## 2021-06-03 PROCEDURE — 82565 ASSAY OF CREATININE: CPT

## 2021-06-03 PROCEDURE — 71260 CT THORAX DX C+: CPT

## 2021-06-03 PROCEDURE — 85025 COMPLETE CBC W/AUTO DIFF WBC: CPT

## 2021-06-03 PROCEDURE — 36415 COLL VENOUS BLD VENIPUNCTURE: CPT

## 2021-06-03 PROCEDURE — 6360000004 HC RX CONTRAST MEDICATION: Performed by: INTERNAL MEDICINE

## 2021-06-03 RX ADMIN — IOPAMIDOL 75 ML: 755 INJECTION, SOLUTION INTRAVENOUS at 08:16

## 2021-06-03 NOTE — TELEPHONE ENCOUNTER
Patient called to let Dr. Bridgett Thomason know that he had his Ct and blood work done. He would like results.

## 2021-06-03 NOTE — RESULT ENCOUNTER NOTE
Unfortunately jaime never picked his itraconazole script ordered on 2/2021 for 3 months - his last picked was the script of 1/2021 for a month by Dr Clint Carias, he also did not make his FU in march as requested and has been very difficult to reach all along - did  not do the labs regularly as requested either, and hence the progression in his histoplasma illness as reflected on this CT chest of 6/2021- I am not sure is had stopped the meds all together or just took them erratically. I like to give him a final chance to take his meds regularly and come to the office, vs choose to go to another ID office for treatment - pls try to reach him ebenezer one of his contacts, to discuss the 2 options above - if he chooses to still see me, I ask him to be very adherent to tx and labs to secure response without complications.   Pls let me know what he decides

## 2021-06-03 NOTE — TELEPHONE ENCOUNTER
Spoke to patient and he has NOT taken his medication. He asked if you would write another script to [address] [place] [address] which in in the system. I did a pending order.  He made a f/u appt for 6-23

## 2021-08-18 ENCOUNTER — OFFICE VISIT (OUTPATIENT)
Dept: INFECTIOUS DISEASES | Age: 21
End: 2021-08-18
Payer: COMMERCIAL

## 2021-08-18 VITALS
SYSTOLIC BLOOD PRESSURE: 138 MMHG | DIASTOLIC BLOOD PRESSURE: 76 MMHG | WEIGHT: 213 LBS | RESPIRATION RATE: 14 BRPM | BODY MASS INDEX: 33.43 KG/M2 | HEIGHT: 67 IN | HEART RATE: 66 BPM

## 2021-08-18 DIAGNOSIS — B39.1 CHRONIC PULMONARY HISTOPLASMOSIS CAPSULATI (HCC): Primary | ICD-10-CM

## 2021-08-18 PROCEDURE — 4004F PT TOBACCO SCREEN RCVD TLK: CPT | Performed by: INTERNAL MEDICINE

## 2021-08-18 PROCEDURE — 99214 OFFICE O/P EST MOD 30 MIN: CPT | Performed by: INTERNAL MEDICINE

## 2021-08-18 PROCEDURE — G8427 DOCREV CUR MEDS BY ELIG CLIN: HCPCS | Performed by: INTERNAL MEDICINE

## 2021-08-18 PROCEDURE — G8417 CALC BMI ABV UP PARAM F/U: HCPCS | Performed by: INTERNAL MEDICINE

## 2021-08-18 ASSESSMENT — ENCOUNTER SYMPTOMS
EYE DISCHARGE: 0
PHOTOPHOBIA: 0
COLOR CHANGE: 0
APNEA: 0
ABDOMINAL DISTENTION: 0
CHOKING: 0

## 2021-08-18 NOTE — PROGRESS NOTES
[facility] - Initial Consult Note  Today's Date: 8/18/2021    Impression :   · Active pulmonary bilat multilobar nodules - likely diffuse pulm histoplasmosis w involvement of the mediastinum  · Fever 102, Sore throat from yelling 9/2020, no pharyngitis  · Failed po AB x 2 course  · BC + strep viridens x 1 and SNC x 1 [facility] - ? Significance -9/22/20  · DANE neg 9/26/20 - teeth good  · All repeat BC neg - no ivdu  · F responded on ceftriaxone gent 10 days, relapsed on ceftriaxone alone -Ct worse 10/10/2020  · GC/ HIV/ chlamydia all neg - EBV inactive  · vanco / cefepime 10/12/20 - declined Bronch - fever/ low elevation CRP maintained till 10/21 LGF, the resolved - CT chest 11/9/20 much smaller nodules -vanco trough 10-12 always  · 11/9/20 increase vanco trough between 14-17 to improve response - DW pt risk on kidneys through small  · 10/2020 titers high for histo - serum AG neg  · 12/420 itraconazole 200 mg daily  · 12/4/20 histo titers down by half  · 12/4/20 CT chest lesions had increased then stabilized  · CT 6/2021 chest worse lesions - pt off meds  X long time  · 8./2021 resumed meds 200 itraconazole 2 x per day  · Non compliance w meds and labs and responding to calls, likely due to underlying depression - being treated fr depression    Recommendations     Keep itraconazole 200 mg bid x 3 months and labs w level - plan on CT chest end 10/2021  See me monthly  Explained to the pt might need longer course of itra due to more progressive and chronic disease       Diagnosis Orders   1. Chronic pulmonary histoplasmosis capsulati [facility])  Hepatic Function Panel    Itraconazole Level       Return in about 4 weeks (around 9/15/2021).       History of Present Illness:   Aissatou Hubbard is a 23y.o.-year-old  male who presents with   Chief Complaint   Patient presents with    Frequent Infections   virtual visit 11/9/20  · Active pulmonary emboli bilat multilobar   · Fever 102, Sore throat from yelling 9/2020, no pharyngitis  · Failed po AB x 2 course  · BC + strep viridens x 1 and SNC x 1 [facility] - ? Significance -9/22/20  · DANE neg 9/26/20 - teeth good  · All repeat BC neg - no ivdu  · F responded on ceftriaxone gent 10 days, relapsed on ceftriaxone alone -Ct worse 10/10/2020  · GC/ HIV/ chlamydia all neg - EBV inactive  · vanco / cefepime started 10/12/20 - declined Bronch -offered [facility], never scheduled. · fever/ low elevation CRP maintained till 10/21 LGF, then resolved - CT chest 11/9/20 much smaller nodules -vanco trough 10-12 always  · 11/9/20 increase vanco trough between 14-17 to improve response - DW pt risk on kidneys through small    He feels good no abdominal pain no diarrhea tolerating the medication very well the PICC line has been clotted but it has been opened and it does not flush at this point. The patient is on saline flushes, will ask [place] to make it heparin flushes to avoid further clotting of the picc. Pt happy no issues or complaints    Plan   · coram to increase the vanco trough 14-17- not less  · Start using the heparin flushes in the picc to avoid clotting   · See me in a month after a CT chest done then - pls schedule  · Keep same ceftriaxone and vanco x another 30 days till 12/9/20  · Same blood work creat and vanco 2 x per week and CRP cbc diff weekly till end of AB    Visit 1/4/21  Histo titers increased 10/2020 and dropped by half 12/4/20  conferred w [facility] ID and due to concern for diffuse pulm and mediastinal histo, started 100 mg bid itraconazole 12/4/20 and did well - histoplasma better  Level was 0.5- LFT ok  Feels great - no fever  Or sweats  reepat CT chest w contrast   Likely histo diffuse mediastinal and pulm -   To see DR Gali Stewart for ? underlying immuno deficiency - no appoint yet, weill facilitate.   Histoplasma AB 10/2020 1/32 and 1/512  Repeat early itra tx was already better 1/16 and 1/128  Serum AG was neg in blood - U test never ran.    Plan;  Plan 6 mon histo at least for now  Immunologist look for immuno def  Repeat histo titers  And U histo AG  itracon level  Ct chest w contrast  See in 1 month    Visit 2/1/21  On itra 2 months  Titers better and trough 0.4 but CT chest 1/21 shows increase in the size of the nodules but improved LN mediastinal and hilar. .  Pt to see Dr Tim Bell and  U histo AG neg  Histo AB less, 1/16 and 1/132  Ran out of itra last week      will add itra an extra 200 mg at noon-  Repeat itra level and histo titers  See Dr Tim Bell for opinion in the pulm nodules, might be non histoplasma, asked for a pulm biopsy. Exam neg    Note 6/3/21  Unfortunately jaime never picked his itraconazole script ordered on 2/2021 for 3 months - his last picked was the script of 1/2021 for a month by Dr iTm Bell, he also did not make his FU in march as requested and has been very difficult to reach all along - did  not do the labs regularly as requested either, and hence the progression in his histoplasma illness as reflected on this CT chest of 6/2021- I am not sure is had stopped the meds all together or just took them erratically. I like to give him a final chance to take his meds regularly and come to the office, vs choose to go to another ID office for treatment - pls try to reach him ebenezer one of his contacts, to discuss the 2 options above - if he chooses to still see me, I ask him to be very adherent to tx and labs to secure response without complications. Pls let me know what he decides    Pt called back and stated he ll show up and take his meds -  Still did not show up sometimes to office    CT chest 6/2021  Innumerable scattered subcentimeter solid pulmonary nodules consistent with the history of histoplasmosis. These show variable interval change. Some of resolved whereas there are new nodules and some nodules have increased in size whereas others have decreased in size.  2. Stable mediastinal lymphadenopathy up to 13 mm short axis also consistent with the provided history. Visit 8/18/21 - seen in office  Nodule lung biopsy 2/2021 -Active and organizing pneumonia with necrotizing and   nonnecrotizing granulomas. Negative for malignancy. Back on the itraconazole 200 mg 2 x per day, started again within 8/2021  Last labs June 2021    No fever - no chest pain  SOB   Slight cough might be from cig  Wt stable    Exam- neg but for extra wt    Plan:  Keep itraconazole 200 mg bid x 3 months and labs w level - plan on CT chest end 10/2021  See me monthly  Explained to the pt might need longer course of itra due to more progressive and chronic disease            I have personally reviewed the past medical history, past surgical history, medications, social history, and family history, and I haveupdated the database accordingly.   Past Medical History:     Past Medical History:   Diagnosis Date    Anxiety     Asthma     Depression     Histoplasmosis     following with Dr. Cydney Wesley Multiple lung nodules on CT     Schizoaffective disorder [facility])        Past Surgical  History:     Past Surgical History:   Procedure Laterality Date    CT NEEDLE BIOPSY LUNG PERCUTANEOUS  2/10/2021    CT NEEDLE BIOPSY LUNG PERCUTANEOUS 2/10/2021 [facility] CT SCAN    HC PICC LINE DOUBLE LUMEN  10/15/2020         WISDOM TOOTH EXTRACTION      While in high school       Medications:     Current Outpatient Medications:     itraconazole (SPORANOX) 100 MG capsule, , Disp: , Rfl:     OLANZapine (ZYPREXA) 10 MG tablet, take 1 tablet by mouth once daily, Disp: , Rfl:     albuterol sulfate HFA (VENTOLIN HFA) 108 (90 Base) MCG/ACT inhaler, Inhale 2 puffs into the lungs 4 times daily as needed for Wheezing, Disp: 1 Inhaler, Rfl: 0    FLUoxetine (PROZAC) 20 MG capsule, Take 20 mg by mouth daily, Disp: , Rfl:     hydrOXYzine (VISTARIL) 50 MG capsule, Take 50 mg by mouth every 6 hours as needed for Itching, Disp: , Rfl:     ibuprofen (ADVIL;MOTRIN) 400 MG tablet, photophobia, discharge and visual disturbance. Respiratory: Negative for apnea and choking. Cardiovascular: Negative for chest pain. Gastrointestinal: Negative for abdominal distention. Endocrine: Negative for heat intolerance. Genitourinary: Negative for dysuria and flank pain. Musculoskeletal: Negative for arthralgias. Skin: Negative for color change. Allergic/Immunologic: Negative for immunocompromised state. Neurological: Negative for dizziness. Hematological: Negative for adenopathy. Psychiatric/Behavioral: Negative for agitation. Physical Examination :   Blood pressure 138/76, pulse 66, resp. rate 14, height 5' 7\" (1.702 m), weight 213 lb (96.6 kg). Physical Exam  Constitutional:       General: He is not in acute distress. Appearance: Normal appearance. He is not ill-appearing or diaphoretic. HENT:      Head: Normocephalic and atraumatic. Nose: Nose normal.      Mouth/Throat:      Mouth: Mucous membranes are moist.   Eyes:      General: No scleral icterus. Conjunctiva/sclera: Conjunctivae normal.   Cardiovascular:      Rate and Rhythm: Normal rate and regular rhythm. Heart sounds: Normal heart sounds. No murmur heard. No friction rub. Pulmonary:      Effort: No respiratory distress. Breath sounds: Normal breath sounds. Abdominal:      General: There is no distension. Palpations: Abdomen is soft. Genitourinary:     Comments: No steel  Musculoskeletal:         General: No swelling or deformity. Cervical back: Neck supple. No rigidity or tenderness. No muscular tenderness. Skin:     General: Skin is dry. Coloration: Skin is not jaundiced. Neurological:      General: No focal deficit present. Mental Status: He is alert. Psychiatric:         Mood and Affect: Mood normal.         Thought Content:  Thought content normal.           Medical Decision Making:   I have independently reviewed/ordered the following labs:    CBCwith Differential:   Lab Results   Component Value Date    WBC 7.6 06/03/2021    WBC 6.3 02/23/2021    HGB 15.9 06/03/2021    HGB 15.3 02/23/2021    HCT 48.1 06/03/2021    HCT 45.4 02/23/2021     06/03/2021     02/23/2021    LYMPHOPCT 44 06/03/2021    LYMPHOPCT 36 02/23/2021    MONOPCT 10 06/03/2021    MONOPCT 6 02/23/2021     BMP:  Lab Results   Component Value Date     11/09/2020     11/05/2020    K 4.3 11/09/2020    K 4.2 11/05/2020     11/09/2020     11/05/2020    CO2 23 11/09/2020    CO2 25 11/05/2020    BUN 13 12/04/2020    BUN 13 11/30/2020    CREATININE 0.85 06/03/2021    CREATININE 0.94 12/04/2020     Hepatic Function Panel:   Lab Results   Component Value Date    PROT 7.3 02/23/2021    PROT 7.1 12/08/2020    LABALBU 4.4 02/23/2021    LABALBU 4.3 12/08/2020    BILIDIR <0.08 02/23/2021    BILIDIR 0.11 12/08/2020    IBILI CANNOT BE CALCULATED 02/23/2021    IBILI 0.21 12/08/2020    BILITOT 0.18 02/23/2021    BILITOT 0.32 12/08/2020    ALKPHOS 49 02/23/2021    ALKPHOS 61 12/08/2020    ALT 42 02/23/2021    ALT 51 12/08/2020    AST 32 02/23/2021    AST 39 12/08/2020     No results found for: RPR  No results found for: HIV  No results found for: [facility]  Lab Results   Component Value Date    RBC 5.99 06/03/2021    WBC 7.6 06/03/2021     Lab Results   Component Value Date    CREATININE 0.85 06/03/2021    GLUCOSE 121 11/09/2020   you for allowing us to participate in the care of this patient. Please call with questions. Jaimie Delaney MD  - Office: [phone]    Please note that this chart was generated using voice recognition Dragon dictation software. Although every effort was made to ensure the accuracy of this automated transcription, some errors in transcription mayhave occurred.

## 2021-08-18 NOTE — PATIENT INSTRUCTIONS
Keep itraconazole 200 mg bid x 3 months and labs w level - plan on CT chest end 10/2021  See me monthly  might need longer course of itra due to more progressive and chronic disease

## 2021-09-09 ENCOUNTER — HOSPITAL ENCOUNTER (OUTPATIENT)
Age: 21
Discharge: HOME | End: 2021-09-09
Payer: COMMERCIAL

## 2021-09-09 DIAGNOSIS — B39.1 CHRONIC PULMONARY HISTOPLASMOSIS CAPSULATI (HCC): ICD-10-CM

## 2021-09-09 LAB
ALBUMIN SERPL-MCNC: 4.4 G/DL (ref 3.5–5.2)
ALBUMIN/GLOBULIN RATIO: NORMAL (ref 1–2.5)
ALP BLD-CCNC: 53 U/L (ref 40–129)
ALT SERPL-CCNC: 33 U/L (ref 5–41)
AST SERPL-CCNC: 25 U/L
BILIRUB SERPL-MCNC: 0.4 MG/DL (ref 0.3–1.2)
BILIRUBIN DIRECT: 0.13 MG/DL
BILIRUBIN, INDIRECT: 0.27 MG/DL (ref 0–1)
GLOBULIN: NORMAL G/DL (ref 1.5–3.8)
TOTAL PROTEIN: 7.1 G/DL (ref 6.4–8.3)

## 2021-09-09 PROCEDURE — 80189 DRUG ASSAY ITRACONAZOLE: CPT

## 2021-09-09 PROCEDURE — 80076 HEPATIC FUNCTION PANEL: CPT

## 2021-09-09 PROCEDURE — 36415 COLL VENOUS BLD VENIPUNCTURE: CPT

## 2021-09-12 LAB
HYDROXYITRACONAZOLE LEVEL: 1 UG/ML
ITRACONAZOLE LVL: 0.5 UG/ML

## 2021-09-21 ENCOUNTER — TELEPHONE (OUTPATIENT)
Dept: INFECTIOUS DISEASES | Age: 21
End: 2021-09-21

## 2021-09-21 NOTE — TELEPHONE ENCOUNTER
CALLED RICKY LOYD SPOKE WITH [employer] THAT THE SCRIPT WE SENT IN WAS FOR 200MG. SHE RESEARCHED THIS AND 200MG CAPSULE IS OFF THE MARKET. SO IT IS BEING RAN FOR THE 100MG.

## 2021-10-20 ENCOUNTER — VIRTUAL VISIT (OUTPATIENT)
Dept: INFECTIOUS DISEASES | Age: 21
End: 2021-10-20
Payer: COMMERCIAL

## 2021-10-20 DIAGNOSIS — B39.2 PULMONARY HISTOPLASMOSIS (HCC): Primary | ICD-10-CM

## 2021-10-20 PROCEDURE — 99214 OFFICE O/P EST MOD 30 MIN: CPT | Performed by: INTERNAL MEDICINE

## 2021-10-20 PROCEDURE — G8427 DOCREV CUR MEDS BY ELIG CLIN: HCPCS | Performed by: INTERNAL MEDICINE

## 2021-10-20 ASSESSMENT — ENCOUNTER SYMPTOMS
EYE DISCHARGE: 0
APNEA: 0
COUGH: 0
ABDOMINAL DISTENTION: 0
COLOR CHANGE: 0
CHOKING: 0
SHORTNESS OF BREATH: 0
PHOTOPHOBIA: 0

## 2021-10-20 NOTE — PROGRESS NOTES
Juan Coffey, was evaluated through a synchronous (real-time) audio-video encounter. The patient (or guardian if applicable) is aware that this is a billable service. Verbal consent to proceed has been obtained within the past 12 months. The visit was conducted pursuant to the emergency declaration under the [address], [address] [place] [address] authority and the [place] and [place] General Act. Patient identification was verified, and a caregiver was present when appropriate. The patient was located in a state where the provider was credentialed to provide care. --Bonnie Bettencourt MD on 10/20/2021 at 3:33 PM    An electronic signature was used to authenticate this note. [facility] - Initial Consult Note  Today's Date: 10/20/2021    Impression :   · Active pulmonary bilat multilobar nodules - likely diffuse pulm histoplasmosis w involvement of the mediastinum  · Fever 102, Sore throat from yelling 9/2020, no pharyngitis  · Failed po AB x 2 course  · BC + strep viridens x 1 and SNC x 1 [facility] - ?  Significance -9/22/20  · DANE neg 9/26/20 - teeth good  · All repeat BC neg - no ivdu  · F responded on ceftriaxone gent 10 days, relapsed on ceftriaxone alone -Ct worse 10/10/2020  · GC/ HIV/ chlamydia all neg - EBV inactive  · vanco / cefepime 10/12/20 - declined Bronch - fever/ low elevation CRP maintained till 10/21 LGF, the resolved - CT chest 11/9/20 much smaller nodules -vanco trough 10-12 always  · 11/9/20 increase vanco trough between 14-17 to improve response - DW pt risk on kidneys through small  · 10/2020 titers high for histo - serum AG neg  · 12/420 itraconazole 200 mg daily  · 12/4/20 histo titers down by half  · 12/4/20 CT chest lesions had increased then stabilized  · CT 6/2021 chest worse lesions - pt off meds  X long time  · 8./2021 resumed meds 200 itraconazole 2 x per day  · Non compliance w meds and labs and responding to calls, likely due to underlying depression - being treated fr depression    Recommendations     Keep itraconazole 200 mg bid x 3 months and labs w level - plan on CT chest end 10/2021  See me monthly  Explained to the pt might need longer course of itra due to more progressive and chronic disease       Diagnosis Orders   1. Pulmonary histoplasmosis ([facility] [address]  Hepatic Function Panel    CBC With Auto Differential    Creatinine, Serum    Itraconazole Level    C-Reactive Protein    CT CHEST W CONTRAST       Return in about 4 weeks (around 11/17/2021). History of Present Illness:   Mikayla Kerr is a 19y.o.-year-old  male who presents with   Chief Complaint   Patient presents with    Frequent Infections     HISTOPLASMA    Discuss Labs     LABS DONE IN SEPT. virtual visit 11/9/20  · Active pulmonary emboli bilat multilobar   · Fever 102, Sore throat from yelling 9/2020, no pharyngitis  · Failed po AB x 2 course  · BC + strep viridens x 1 and SNC x 1 [facility] - ? Significance -9/22/20  · DANE neg 9/26/20 - teeth good  · All repeat BC neg - no ivdu  · F responded on ceftriaxone gent 10 days, relapsed on ceftriaxone alone -Ct worse 10/10/2020  · GC/ HIV/ chlamydia all neg - EBV inactive  · vanco / cefepime started 10/12/20 - declined Bronch -offered [facility], never scheduled. · fever/ low elevation CRP maintained till 10/21 LGF, then resolved - CT chest 11/9/20 much smaller nodules -vanco trough 10-12 always  · 11/9/20 increase vanco trough between 14-17 to improve response - DW pt risk on kidneys through small    He feels good no abdominal pain no diarrhea tolerating the medication very well the PICC line has been clotted but it has been opened and it does not flush at this point. The patient is on saline flushes, will ask [place] to make it heparin flushes to avoid further clotting of the picc.     Pt happy no issues or complaints    Plan   · coram to increase the vanco trough 14-17- not less  · Start using the heparin flushes in the picc to avoid clotting   · See me in a month after a CT chest done then - pls schedule  · Keep same ceftriaxone and vanco x another 30 days till 12/9/20  · Same blood work creat and vanco 2 x per week and CRP cbc diff weekly till end of AB    Visit 1/4/21  Histo titers increased 10/2020 and dropped by half 12/4/20  conferred w [facility] ID and due to concern for diffuse pulm and mediastinal histo, started 100 mg bid itraconazole 12/4/20 and did well - histoplasma better  Level was 0.5- LFT ok  Feels great - no fever  Or sweats  reepat CT chest w contrast   Likely histo diffuse mediastinal and pulm -   To see DR Nell Elizabeth for ? underlying immuno deficiency - no appoint yet, weill facilitate. Histoplasma AB 10/2020 1/32 and 1/512  Repeat early itra tx was already better 1/16 and 1/128  Serum AG was neg in blood - U test never ran. Plan;  Plan 6 mon histo at least for now  Immunologist look for immuno def  Repeat histo titers  And U histo AG  itracon level  Ct chest w contrast  See in 1 month    Visit 2/1/21  On itra 2 months  Titers better and trough 0.4 but CT chest 1/21 shows increase in the size of the nodules but improved LN mediastinal and hilar. .  Pt to see Dr Jamaica Amaro and  U histo AG neg  Histo AB less, 1/16 and 1/132  Ran out of itra last week      will add itra an extra 200 mg at noon-  Repeat itra level and histo titers  See Dr [place] Amaro for opinion in the pulm nodules, might be non histoplasma, asked for a pulm biopsy.     Exam neg    Note 6/3/21  Unfortunately jaime never picked his itraconazole script ordered on 2/2021 for 3 months - his last picked was the script of 1/2021 for a month by Dr [place] Amaro, he also did not make his FU in march as requested and has been very difficult to reach all along - did  not do the labs regularly as requested either, and hence the progression in his histoplasma illness as reflected on this CT chest of or phlem no fever or chills or sweats    plan  On the itra regularly x 5 weeks   Get the labs soon  CT chest w contrast look for resolution  Keep the tiffanie meds -  Might need long term itra for chronic mediastinal histop                  I have personally reviewed the past medical history, past surgical history, medications, social history, and family history, and I haveupdated the database accordingly.   Past Medical History:     Past Medical History:   Diagnosis Date    Anxiety     Asthma     Depression     Histoplasmosis     following with Dr. Karin Tony Multiple lung nodules on CT     Schizoaffective disorder [facility])        Past Surgical  History:     Past Surgical History:   Procedure Laterality Date    CT NEEDLE BIOPSY LUNG PERCUTANEOUS  2/10/2021    CT NEEDLE BIOPSY LUNG PERCUTANEOUS 2/10/2021 [facility] CT SCAN    HC PICC LINE DOUBLE LUMEN  10/15/2020         WISDOM TOOTH EXTRACTION      While in high school       Medications:     Current Outpatient Medications:     itraconazole (SPORANOX) 100 MG capsule, , Disp: , Rfl:     OLANZapine (ZYPREXA) 10 MG tablet, take 1 tablet by mouth once daily, Disp: , Rfl:     albuterol sulfate HFA (VENTOLIN HFA) 108 (90 Base) MCG/ACT inhaler, Inhale 2 puffs into the lungs 4 times daily as needed for Wheezing, Disp: 1 Inhaler, Rfl: 0    FLUoxetine (PROZAC) 20 MG capsule, Take 20 mg by mouth daily, Disp: , Rfl:     hydrOXYzine (VISTARIL) 50 MG capsule, Take 50 mg by mouth every 6 hours as needed for Itching, Disp: , Rfl:     ibuprofen (ADVIL;MOTRIN) 400 MG tablet, Take 400 mg by mouth every 6 hours as needed for Pain, Disp: , Rfl:       Social History:     Social History     Socioeconomic History    Marital status: Single     Spouse name: Not on file    Number of children: Not on file    Years of education: Not on file    Highest education level: Not on file   Occupational History    Not on file   Tobacco Use    Smoking status: Current Every Day Smoker     Packs/day: 0.50     Years: 4.00     Pack years: 2.00     Types: Cigarettes    Smokeless tobacco: Never Used   Vaping Use    Vaping Use: Never assessed   Substance and Sexual Activity    Alcohol use: Not Currently    Drug use: Never    Sexual activity: Not on file   Other Topics Concern    Not on file   Social History Narrative    Not on file     Social Determinants of Health     Financial Resource Strain:     Difficulty of Paying Living Expenses:    Food Insecurity:     Worried About Running Out of Food in the Last Year:     Ran Out of Food in the Last Year:    Transportation Needs:     Lack of Transportation (Medical):  Lack of Transportation (Non-Medical):    Physical Activity:     Days of Exercise per Week:     Minutes of Exercise per Session:    Stress:     Feeling of Stress :    Social Connections:     Frequency of Communication with Friends and Family:     Frequency of Social Gatherings with Friends and Family:     Attends Sabianism Services:     Active Member of Clubs or Organizations:     Attends Club or Organization Meetings:     Marital Status:    Intimate Partner Violence:     Fear of Current or Ex-Partner:     Emotionally Abused:     Physically Abused:     Sexually Abused:        Family History:     Family History   Problem Relation Age of Onset    Stroke Mother         Allergies:   Patient has no known allergies. Review of Systems:   Review of Systems   Constitutional: Negative for activity change, appetite change, fatigue and fever. HENT: Negative for congestion. Eyes: Negative for photophobia, discharge and visual disturbance. Respiratory: Negative for apnea, cough, choking and shortness of breath. Cardiovascular: Negative for chest pain. Gastrointestinal: Negative for abdominal distention. Endocrine: Negative for heat intolerance, polydipsia and polyphagia. Genitourinary: Negative for dysuria and flank pain. Musculoskeletal: Negative for arthralgias.    Skin: Negative for color change. Allergic/Immunologic: Negative for immunocompromised state. Neurological: Negative for dizziness. Hematological: Negative for adenopathy. Psychiatric/Behavioral: Negative for agitation. Physical Examination :   There were no vitals taken for this visit. Physical Exam      Medical Decision Making:   I have independently reviewed/ordered the following labs:    CBCwith Differential:   Lab Results   Component Value Date    WBC 7.6 06/03/2021    WBC 6.3 02/23/2021    HGB 15.9 06/03/2021    HGB 15.3 02/23/2021    HCT 48.1 06/03/2021    HCT 45.4 02/23/2021     06/03/2021     02/23/2021    LYMPHOPCT 44 06/03/2021    LYMPHOPCT 36 02/23/2021    MONOPCT 10 06/03/2021    MONOPCT 6 02/23/2021     BMP:  Lab Results   Component Value Date     11/09/2020     11/05/2020    K 4.3 11/09/2020    K 4.2 11/05/2020     11/09/2020     11/05/2020    CO2 23 11/09/2020    CO2 25 11/05/2020    BUN 13 12/04/2020    BUN 13 11/30/2020    CREATININE 0.85 06/03/2021    CREATININE 0.94 12/04/2020     Hepatic Function Panel:   Lab Results   Component Value Date    PROT 7.1 09/09/2021    PROT 7.3 02/23/2021    LABALBU 4.4 09/09/2021    LABALBU 4.4 02/23/2021    BILIDIR 0.13 09/09/2021    BILIDIR <0.08 02/23/2021    IBILI 0.27 09/09/2021    IBILI CANNOT BE CALCULATED 02/23/2021    BILITOT 0.40 09/09/2021    BILITOT 0.18 02/23/2021    ALKPHOS 53 09/09/2021    ALKPHOS 49 02/23/2021    ALT 33 09/09/2021    ALT 42 02/23/2021    AST 25 09/09/2021    AST 32 02/23/2021     No results found for: RPR  No results found for: HIV  No results found for: [facility]  Lab Results   Component Value Date    RBC 5.99 06/03/2021    WBC 7.6 06/03/2021     Lab Results   Component Value Date    CREATININE 0.85 06/03/2021    GLUCOSE 121 11/09/2020   you for allowing us to participate in the care of this patient. Please call with questions.       Alma Correa MD  - Office: [phone]    Please note that this chart was generated using voice recognition Dragon dictation software. Although every effort was made to ensure the accuracy of this automated transcription, some errors in transcription mayhave occurred.

## 2021-11-04 ENCOUNTER — HOSPITAL ENCOUNTER (OUTPATIENT)
Age: 21
Discharge: HOME | End: 2021-11-04
Payer: COMMERCIAL

## 2021-11-04 ENCOUNTER — HOSPITAL ENCOUNTER (OUTPATIENT)
Dept: CT IMAGING | Age: 21
Discharge: HOME | End: 2021-11-06
Payer: COMMERCIAL

## 2021-11-04 DIAGNOSIS — B39.2 PULMONARY HISTOPLASMOSIS (HCC): ICD-10-CM

## 2021-11-04 LAB
ABSOLUTE EOS #: 0.14 K/UL (ref 0–0.44)
ABSOLUTE IMMATURE GRANULOCYTE: 0.05 K/UL (ref 0–0.3)
ABSOLUTE LYMPH #: 2.59 K/UL (ref 1.1–3.7)
ABSOLUTE MONO #: 0.77 K/UL (ref 0.1–1.2)
ALBUMIN SERPL-MCNC: 4.4 G/DL (ref 3.5–5.2)
ALBUMIN/GLOBULIN RATIO: 1.3 (ref 1–2.5)
ALP BLD-CCNC: 61 U/L (ref 40–129)
ALT SERPL-CCNC: 19 U/L (ref 5–41)
AST SERPL-CCNC: 19 U/L
BASOPHILS # BLD: 1 % (ref 0–2)
BASOPHILS ABSOLUTE: 0.05 K/UL (ref 0–0.2)
BILIRUB SERPL-MCNC: 0.24 MG/DL (ref 0.3–1.2)
BILIRUBIN DIRECT: 0.09 MG/DL
BILIRUBIN, INDIRECT: 0.15 MG/DL (ref 0–1)
C-REACTIVE PROTEIN: <3 MG/L (ref 0–5)
CREAT SERPL-MCNC: 0.85 MG/DL (ref 0.7–1.2)
DIFFERENTIAL TYPE: ABNORMAL
EOSINOPHILS RELATIVE PERCENT: 1 % (ref 1–4)
GFR AFRICAN AMERICAN: >60 ML/MIN
GFR NON-AFRICAN AMERICAN: >60 ML/MIN
GFR SERPL CREATININE-BSD FRML MDRD: NORMAL ML/MIN/{1.73_M2}
GFR SERPL CREATININE-BSD FRML MDRD: NORMAL ML/MIN/{1.73_M2}
GLOBULIN: ABNORMAL G/DL (ref 1.5–3.8)
HCT VFR BLD CALC: 52.4 % (ref 40.7–50.3)
HEMOGLOBIN: 17.6 G/DL (ref 13–17)
IMMATURE GRANULOCYTES: 1 %
LYMPHOCYTES # BLD: 26 % (ref 24–43)
MCH RBC QN AUTO: 27.8 PG (ref 25.2–33.5)
MCHC RBC AUTO-ENTMCNC: 33.6 G/DL (ref 28.4–34.8)
MCV RBC AUTO: 82.6 FL (ref 82.6–102.9)
MONOCYTES # BLD: 8 % (ref 3–12)
NRBC AUTOMATED: 0 PER 100 WBC
PDW BLD-RTO: 13.1 % (ref 11.8–14.4)
PLATELET # BLD: 233 K/UL (ref 138–453)
PLATELET ESTIMATE: ABNORMAL
PMV BLD AUTO: 11.2 FL (ref 8.1–13.5)
RBC # BLD: 6.34 M/UL (ref 4.21–5.77)
RBC # BLD: ABNORMAL 10*6/UL
SEG NEUTROPHILS: 63 % (ref 36–65)
SEGMENTED NEUTROPHILS ABSOLUTE COUNT: 6.54 K/UL (ref 1.5–8.1)
TOTAL PROTEIN: 7.8 G/DL (ref 6.4–8.3)
WBC # BLD: 10.1 K/UL (ref 3.5–11.3)
WBC # BLD: ABNORMAL 10*3/UL

## 2021-11-04 PROCEDURE — 80189 DRUG ASSAY ITRACONAZOLE: CPT

## 2021-11-04 PROCEDURE — 36415 COLL VENOUS BLD VENIPUNCTURE: CPT

## 2021-11-04 PROCEDURE — 80076 HEPATIC FUNCTION PANEL: CPT

## 2021-11-04 PROCEDURE — 86140 C-REACTIVE PROTEIN: CPT

## 2021-11-04 PROCEDURE — 82565 ASSAY OF CREATININE: CPT

## 2021-11-04 PROCEDURE — 6360000004 HC RX CONTRAST MEDICATION: Performed by: INTERNAL MEDICINE

## 2021-11-04 PROCEDURE — 71260 CT THORAX DX C+: CPT

## 2021-11-04 PROCEDURE — 85025 COMPLETE CBC W/AUTO DIFF WBC: CPT

## 2021-11-04 RX ADMIN — IOPAMIDOL 75 ML: 755 INJECTION, SOLUTION INTRAVENOUS at 14:00

## 2021-11-05 ENCOUNTER — TELEPHONE (OUTPATIENT)
Dept: INFECTIOUS DISEASES | Age: 21
End: 2021-11-05

## 2021-11-05 NOTE — TELEPHONE ENCOUNTER
Pt returned office phone call regarding test results. Pt stated he no longer has insurance and his Rx will cost him $800.00   He is on his last pill. He is going to call his .

## 2021-11-10 LAB
HYDROXYITRACONAZOLE LEVEL: 2.2 UG/ML
ITRACONAZOLE LVL: 0.9 UG/ML

## 2021-11-17 ENCOUNTER — VIRTUAL VISIT (OUTPATIENT)
Dept: INFECTIOUS DISEASES | Age: 21
End: 2021-11-17

## 2021-11-22 NOTE — TELEPHONE ENCOUNTER
Attempted contact with pt for 2 wks- plus missed appt. Signing encounter and will re-address if/when pt follows up.

## 2022-01-27 ENCOUNTER — HOSPITAL ENCOUNTER (EMERGENCY)
Age: 22
Discharge: PSYCHIATRIC OR SUBSTANCE USE FACILITY | End: 2022-01-28
Attending: EMERGENCY MEDICINE
Payer: COMMERCIAL

## 2022-01-27 ENCOUNTER — APPOINTMENT (OUTPATIENT)
Dept: GENERAL RADIOLOGY | Age: 22
End: 2022-01-27
Payer: COMMERCIAL

## 2022-01-27 DIAGNOSIS — R45.851 DEPRESSION WITH SUICIDAL IDEATION: Primary | ICD-10-CM

## 2022-01-27 DIAGNOSIS — F32.A DEPRESSION WITH SUICIDAL IDEATION: Primary | ICD-10-CM

## 2022-01-27 PROBLEM — F25.9 SCHIZOAFFECTIVE DISORDER (HCC): Status: ACTIVE | Noted: 2022-01-27

## 2022-01-27 LAB
ABSOLUTE EOS #: 0.09 K/UL (ref 0–0.44)
ABSOLUTE IMMATURE GRANULOCYTE: <0.03 K/UL (ref 0–0.3)
ABSOLUTE LYMPH #: 3.47 K/UL (ref 1.1–3.7)
ABSOLUTE MONO #: 0.79 K/UL (ref 0.1–1.2)
BASOPHILS # BLD: 1 % (ref 0–2)
BASOPHILS ABSOLUTE: 0.05 K/UL (ref 0–0.2)
DIFFERENTIAL TYPE: NORMAL
EOSINOPHILS RELATIVE PERCENT: 1 % (ref 1–4)
ETHANOL PERCENT: <0.01 %
ETHANOL: <10 MG/DL
HCT VFR BLD CALC: 42 % (ref 40.7–50.3)
HEMOGLOBIN: 14.4 G/DL (ref 13–17)
IMMATURE GRANULOCYTES: 0 %
LYMPHOCYTES # BLD: 39 % (ref 24–43)
MCH RBC QN AUTO: 28.5 PG (ref 25.2–33.5)
MCHC RBC AUTO-ENTMCNC: 34.3 G/DL (ref 28.4–34.8)
MCV RBC AUTO: 83.2 FL (ref 82.6–102.9)
MONOCYTES # BLD: 9 % (ref 3–12)
NRBC AUTOMATED: 0 PER 100 WBC
PDW BLD-RTO: 12.9 % (ref 11.8–14.4)
PLATELET # BLD: 250 K/UL (ref 138–453)
PLATELET ESTIMATE: NORMAL
PMV BLD AUTO: 11.8 FL (ref 8.1–13.5)
RBC # BLD: 5.05 M/UL (ref 4.21–5.77)
RBC # BLD: NORMAL 10*6/UL
SARS-COV-2, RAPID: NOT DETECTED
SEG NEUTROPHILS: 50 % (ref 36–65)
SEGMENTED NEUTROPHILS ABSOLUTE COUNT: 4.52 K/UL (ref 1.5–8.1)
SPECIMEN DESCRIPTION: NORMAL
WBC # BLD: 8.9 K/UL (ref 3.5–11.3)
WBC # BLD: NORMAL 10*3/UL

## 2022-01-27 PROCEDURE — 80053 COMPREHEN METABOLIC PANEL: CPT

## 2022-01-27 PROCEDURE — G0480 DRUG TEST DEF 1-7 CLASSES: HCPCS

## 2022-01-27 PROCEDURE — 85025 COMPLETE CBC W/AUTO DIFF WBC: CPT

## 2022-01-27 PROCEDURE — 87635 SARS-COV-2 COVID-19 AMP PRB: CPT

## 2022-01-27 PROCEDURE — 71045 X-RAY EXAM CHEST 1 VIEW: CPT

## 2022-01-27 PROCEDURE — 99285 EMERGENCY DEPT VISIT HI MDM: CPT

## 2022-01-27 PROCEDURE — 80307 DRUG TEST PRSMV CHEM ANLYZR: CPT

## 2022-01-28 ENCOUNTER — HOSPITAL ENCOUNTER (INPATIENT)
Age: 22
LOS: 3 days | Discharge: HOME | DRG: 750 | End: 2022-01-31
Attending: PSYCHIATRY & NEUROLOGY | Admitting: PSYCHIATRY & NEUROLOGY
Payer: COMMERCIAL

## 2022-01-28 VITALS
DIASTOLIC BLOOD PRESSURE: 71 MMHG | WEIGHT: 210 LBS | BODY MASS INDEX: 32.96 KG/M2 | TEMPERATURE: 97.2 F | OXYGEN SATURATION: 96 % | SYSTOLIC BLOOD PRESSURE: 128 MMHG | HEART RATE: 72 BPM | HEIGHT: 67 IN | RESPIRATION RATE: 16 BRPM

## 2022-01-28 PROBLEM — F25.0 SCHIZOAFFECTIVE DISORDER, BIPOLAR TYPE (HCC): Status: ACTIVE | Noted: 2022-01-28

## 2022-01-28 PROBLEM — F32.A DEPRESSION WITH SUICIDAL IDEATION: Status: ACTIVE | Noted: 2022-01-28

## 2022-01-28 PROBLEM — F60.3 BORDERLINE PERSONALITY DISORDER (HCC): Status: ACTIVE | Noted: 2022-01-28

## 2022-01-28 PROBLEM — R45.851 DEPRESSION WITH SUICIDAL IDEATION: Status: ACTIVE | Noted: 2022-01-28

## 2022-01-28 LAB
ALBUMIN SERPL-MCNC: 4.5 G/DL (ref 3.5–5.2)
ALBUMIN/GLOBULIN RATIO: 1.9 (ref 1–2.5)
ALP BLD-CCNC: 56 U/L (ref 40–129)
ALT SERPL-CCNC: 16 U/L (ref 5–41)
AMPHETAMINE SCREEN URINE: NEGATIVE
ANION GAP SERPL CALCULATED.3IONS-SCNC: 14 MMOL/L (ref 9–17)
AST SERPL-CCNC: 15 U/L
BARBITURATE SCREEN URINE: NEGATIVE
BENZODIAZEPINE SCREEN, URINE: NEGATIVE
BILIRUB SERPL-MCNC: 0.18 MG/DL (ref 0.3–1.2)
BUN BLDV-MCNC: 12 MG/DL (ref 6–20)
BUN/CREAT BLD: ABNORMAL (ref 9–20)
BUPRENORPHINE URINE: NORMAL
CALCIUM SERPL-MCNC: 9.7 MG/DL (ref 8.6–10.4)
CANNABINOID SCREEN URINE: NEGATIVE
CHLORIDE BLD-SCNC: 105 MMOL/L (ref 98–107)
CO2: 22 MMOL/L (ref 20–31)
COCAINE METABOLITE, URINE: NEGATIVE
CREAT SERPL-MCNC: 0.86 MG/DL (ref 0.7–1.2)
GFR AFRICAN AMERICAN: >60 ML/MIN
GFR NON-AFRICAN AMERICAN: >60 ML/MIN
GFR SERPL CREATININE-BSD FRML MDRD: ABNORMAL ML/MIN/{1.73_M2}
GFR SERPL CREATININE-BSD FRML MDRD: ABNORMAL ML/MIN/{1.73_M2}
GLUCOSE BLD-MCNC: 100 MG/DL (ref 70–99)
MDMA URINE: NORMAL
METHADONE SCREEN, URINE: NEGATIVE
METHAMPHETAMINE, URINE: NORMAL
OPIATES, URINE: NEGATIVE
OXYCODONE SCREEN URINE: NEGATIVE
PHENCYCLIDINE, URINE: NEGATIVE
POTASSIUM SERPL-SCNC: 4.3 MMOL/L (ref 3.7–5.3)
PROPOXYPHENE, URINE: NORMAL
SODIUM BLD-SCNC: 141 MMOL/L (ref 135–144)
TEST INFORMATION: NORMAL
TOTAL PROTEIN: 6.9 G/DL (ref 6.4–8.3)
TRICYCLIC ANTIDEPRESSANTS, UR: NORMAL

## 2022-01-28 PROCEDURE — 6370000000 HC RX 637 (ALT 250 FOR IP): Performed by: PSYCHIATRY & NEUROLOGY

## 2022-01-28 PROCEDURE — 1240000000 HC EMOTIONAL WELLNESS R&B

## 2022-01-28 PROCEDURE — APPSS180 APP SPLIT SHARED TIME > 60 MINUTES

## 2022-01-28 PROCEDURE — 90792 PSYCH DIAG EVAL W/MED SRVCS: CPT | Performed by: PSYCHIATRY & NEUROLOGY

## 2022-01-28 RX ORDER — FLUOXETINE HYDROCHLORIDE 20 MG/1
20 CAPSULE ORAL DAILY
Status: DISCONTINUED | OUTPATIENT
Start: 2022-01-28 | End: 2022-01-31 | Stop reason: HOSPADM

## 2022-01-28 RX ORDER — OLANZAPINE 10 MG/1
10 TABLET ORAL NIGHTLY
Status: DISCONTINUED | OUTPATIENT
Start: 2022-01-28 | End: 2022-01-31 | Stop reason: HOSPADM

## 2022-01-28 RX ORDER — ACETAMINOPHEN 325 MG/1
650 TABLET ORAL EVERY 4 HOURS PRN
Status: DISCONTINUED | OUTPATIENT
Start: 2022-01-28 | End: 2022-01-31 | Stop reason: HOSPADM

## 2022-01-28 RX ORDER — LITHIUM CARBONATE 300 MG/1
300 TABLET, FILM COATED, EXTENDED RELEASE ORAL EVERY 12 HOURS SCHEDULED
Status: DISCONTINUED | OUTPATIENT
Start: 2022-01-28 | End: 2022-01-31 | Stop reason: HOSPADM

## 2022-01-28 RX ORDER — NICOTINE 21 MG/24HR
1 PATCH, TRANSDERMAL 24 HOURS TRANSDERMAL DAILY
Status: DISCONTINUED | OUTPATIENT
Start: 2022-01-28 | End: 2022-01-28

## 2022-01-28 RX ORDER — TRAZODONE HYDROCHLORIDE 50 MG/1
50 TABLET ORAL NIGHTLY PRN
Status: DISCONTINUED | OUTPATIENT
Start: 2022-01-28 | End: 2022-01-31 | Stop reason: HOSPADM

## 2022-01-28 RX ORDER — IBUPROFEN 400 MG/1
400 TABLET ORAL EVERY 6 HOURS PRN
Status: DISCONTINUED | OUTPATIENT
Start: 2022-01-28 | End: 2022-01-31 | Stop reason: HOSPADM

## 2022-01-28 RX ORDER — POLYETHYLENE GLYCOL 3350 17 G/17G
17 POWDER, FOR SOLUTION ORAL DAILY PRN
Status: DISCONTINUED | OUTPATIENT
Start: 2022-01-28 | End: 2022-01-31 | Stop reason: HOSPADM

## 2022-01-28 RX ORDER — HYDROXYZINE 50 MG/1
50 TABLET, FILM COATED ORAL 3 TIMES DAILY PRN
Status: DISCONTINUED | OUTPATIENT
Start: 2022-01-28 | End: 2022-01-31 | Stop reason: HOSPADM

## 2022-01-28 RX ORDER — ALBUTEROL SULFATE 90 UG/1
2 AEROSOL, METERED RESPIRATORY (INHALATION) 4 TIMES DAILY PRN
Status: DISCONTINUED | OUTPATIENT
Start: 2022-01-28 | End: 2022-01-31 | Stop reason: HOSPADM

## 2022-01-28 RX ORDER — MAGNESIUM HYDROXIDE/ALUMINUM HYDROXICE/SIMETHICONE 120; 1200; 1200 MG/30ML; MG/30ML; MG/30ML
30 SUSPENSION ORAL EVERY 6 HOURS PRN
Status: DISCONTINUED | OUTPATIENT
Start: 2022-01-28 | End: 2022-01-31 | Stop reason: HOSPADM

## 2022-01-28 RX ADMIN — NICOTINE POLACRILEX 2 MG: 2 GUM, CHEWING BUCCAL at 15:01

## 2022-01-28 RX ADMIN — FLUOXETINE HYDROCHLORIDE 20 MG: 20 CAPSULE ORAL at 12:08

## 2022-01-28 RX ADMIN — NICOTINE POLACRILEX 2 MG: 2 GUM, CHEWING BUCCAL at 21:03

## 2022-01-28 RX ADMIN — NICOTINE POLACRILEX 2 MG: 2 GUM, CHEWING BUCCAL at 12:11

## 2022-01-28 RX ADMIN — LITHIUM CARBONATE 300 MG: 300 TABLET, FILM COATED, EXTENDED RELEASE ORAL at 21:03

## 2022-01-28 RX ADMIN — TRAZODONE HYDROCHLORIDE 50 MG: 50 TABLET ORAL at 21:03

## 2022-01-28 RX ADMIN — LITHIUM CARBONATE 300 MG: 300 TABLET, FILM COATED, EXTENDED RELEASE ORAL at 12:08

## 2022-01-28 RX ADMIN — OLANZAPINE 10 MG: 10 TABLET, FILM COATED ORAL at 21:03

## 2022-01-28 ASSESSMENT — LIFESTYLE VARIABLES: HISTORY_ALCOHOL_USE: NO

## 2022-01-28 ASSESSMENT — PAIN SCALES - GENERAL
PAINLEVEL_OUTOF10: 0

## 2022-01-28 ASSESSMENT — ENCOUNTER SYMPTOMS
SHORTNESS OF BREATH: 0
DIARRHEA: 0
NAUSEA: 0
CONSTIPATION: 0
SORE THROAT: 0
COUGH: 0
VOMITING: 0
ABDOMINAL PAIN: 0
PHOTOPHOBIA: 0

## 2022-01-28 ASSESSMENT — PATIENT HEALTH QUESTIONNAIRE - PHQ9: SUM OF ALL RESPONSES TO PHQ QUESTIONS 1-9: 5

## 2022-01-28 ASSESSMENT — SLEEP AND FATIGUE QUESTIONNAIRES
AVERAGE NUMBER OF SLEEP HOURS: 7
DO YOU USE A SLEEP AID: NO
DO YOU HAVE DIFFICULTY SLEEPING: NO

## 2022-01-28 NOTE — ED PROVIDER NOTES
[address] HANDOFF       Handoff taken on the following patient from prior Attending Physician:  Pt Name: Carolina Shaw  PCP:  Avtar Gloria Blunt    Attestation  I was available and discussed any additional care issues that arose and coordinated the management plans with the resident(s) caring for the patient during my duty period. Any areas of disagreement with resident's documentation of care or procedures are noted on the chart. I was personally present for the key portions of any/all procedures during my duty period. I have documented in the chart those procedures where I was not present during the key portions. CHIEF COMPLAINT       Chief Complaint   Patient presents with    Psychiatric Evaluation     pt is having increased depression and is cutting bilat arms and thighs     Depression    Suicidal         CURRENT MEDICATIONS     Previous Medications  Previous Medications    ALBUTEROL SULFATE HFA (VENTOLIN HFA) 108 (90 BASE) MCG/ACT INHALER    Inhale 2 puffs into the lungs 4 times daily as needed for Wheezing    FLUOXETINE (PROZAC) 20 MG CAPSULE    Take 20 mg by mouth daily    HYDROXYZINE (VISTARIL) 50 MG CAPSULE    Take 50 mg by mouth every 6 hours as needed for Itching    IBUPROFEN (ADVIL;MOTRIN) 400 MG TABLET    Take 400 mg by mouth every 6 hours as needed for Pain    ITRACONAZOLE (SPORANOX) 100 MG CAPSULE        OLANZAPINE (ZYPREXA) 10 MG TABLET    take 1 tablet by mouth once daily       Encounter Medications  No orders of the defined types were placed in this encounter. ALLERGIES     has No Known Allergies.       RECENT VITALS:   Temp: 98.6 °F (37 °C),  Pulse: 72, Resp: 14, BP: 121/79    RADIOLOGY:   XR CHEST PORTABLE   Final Result   No acute cardiopulmonary process             LABS:  Labs Reviewed   COVID-19, RAPID   CBC WITH AUTO DIFFERENTIAL   COMPREHENSIVE METABOLIC PANEL W/ REFLEX TO MG FOR LOW K   ETHANOL   URINE DRUG SCREEN           PLAN/ TASKS OUTSTANDING     This patient is a 19 y.o. Male. Patient has known depression, compliant with meds, worsening depression now with suicidal ideations of slitting wrist.  Patient identifies as female, will need to be admitted.     (Please note that portions of this note were completed with a voice recognition program.  Efforts were made to edit the dictations but occasionally words are mis-transcribed.)    Jimmy Jarvis MD,, MD  Attending Emergency Physician       Jimmy Jarvis MD  01/27/22 0308

## 2022-01-28 NOTE — ED TRIAGE NOTES
Pt is a+o x4 msps intact lungs clear bilat c/c psych eval. Pt states he has had increased thoughts of depression and has been cutting with noted superficial cuts to bilat arms and thighs. Pt  Smiling and cooperative on phone at this time. Pt does not openly admit to having any suicidal or homicidal thoughts at this time with this nurse.

## 2022-01-28 NOTE — BH NOTE
Patient given tobacco quitline number 07376567981 at this time, refusing to call at this time, states \" I just dont want to quit now\"- patient given information as to the dangers of long term tobacco use. Continue to reinforce the importance of tobacco cessation.

## 2022-01-28 NOTE — ED NOTES
Items removed from patient:  Anderson hoodie, socks, pants, sweatshirt, underwear, katie vape, shoes, book and phone.      MAX Aldana  01/27/22 1337

## 2022-01-28 NOTE — ED PROVIDER NOTES
[facility]  ED  Emergency Department Encounter  EmergencyMedicine Resident     Pt Name:Corbin Harp  MRN: [id number]  [address] 2000  Date of evaluation: 1/27/22  PCP:  Yonathan Guzman       Chief Complaint   Patient presents with    Psychiatric Evaluation     pt is having increased depression and is cutting bilat arms and thighs     Depression    Suicidal       HISTORY OF PRESENT ILLNESS  (Location/Symptom, Timing/Onset, Context/Setting, Quality, Duration, Modifying Factors, Severity.)      Rd Chappell is a 19 y.o. male who presents with suicidal nation. Patient has a history of histoplasma that stopped treatment could not follow-up with ID but has no other complaints about it at this time. Patient was earlier tonight there having suicidal thoughts and were cutting her arms and legs. Patient has a past history of suicidal attempts with razor blade cutting deeper into night. Patient denies any alcohol use, hallucinations, or command hallucinations on himself. Patient does not feel safe and has been feeling off in the past 2 to 3 weeks with depression, guilt, decreased appetite, increased sleeping, and feeling \"manic\". Patient taking all psychiatric medications as prescribed. Patient denies any fevers, chills, chest pain, shortness breath, or palpitation, abdominal pain, nausea vomiting, there is constipation, no straining or weakness. PAST MEDICAL / SURGICAL / SOCIAL / FAMILY HISTORY      has a past medical history of Anxiety, Asthma, Depression, Histoplasmosis, Multiple lung nodules on CT, and Schizoaffective disorder ([facility] [address] has a past surgical history that includes [place] tooth extraction; hc picc line double lumen (10/15/2020); and CT NEEDLE BIOPSY LUNG PERCUTANEOUS (2/10/2021).       Social History     Socioeconomic History    Marital status: Single     Spouse name: Not on file    Number of children: Not on file    Years of education: Not on file    Highest education level: Not on file   Occupational History    Not on file   Tobacco Use    Smoking status: Current Every Day Smoker     Packs/day: 0.50     Years: 4.00     Pack years: 2.00     Types: Cigarettes    Smokeless tobacco: Never Used   Vaping Use    Vaping Use: Not on file   Substance and Sexual Activity    Alcohol use: Not Currently    Drug use: Never    Sexual activity: Not on file   Other Topics Concern    Not on file   Social History Narrative    Not on file     Social Determinants of Health     Financial Resource Strain:     Difficulty of Paying Living Expenses: Not on file   Food Insecurity:     Worried About Running Out of Food in the Last Year: Not on file    Mohan of Food in the Last Year: Not on file   Transportation Needs:     Lack of Transportation (Medical): Not on file    Lack of Transportation (Non-Medical): Not on file   Physical Activity:     Days of Exercise per Week: Not on file    Minutes of Exercise per Session: Not on file   Stress:     Feeling of Stress : Not on file   Social Connections:     Frequency of Communication with Friends and Family: Not on file    Frequency of Social Gatherings with Friends and Family: Not on file    Attends Synagogue Services: Not on file    Active Member of [address] [place] [address] [employer] or Organizations: Not on file    Attends Club or Organization Meetings: Not on file    Marital Status: Not on file   Intimate Partner Violence:     Fear of Current or Ex-Partner: Not on file    Emotionally Abused: Not on file    Physically Abused: Not on file    Sexually Abused: Not on file   Housing Stability:     Unable to Pay for Housing in the Last Year: Not on file    Number of [place] in the Last Year: Not on file    Unstable Housing in the Last Year: Not on file       Family History   Problem Relation Age of Onset    Stroke Mother        Allergies:  Patient has no known allergies.     Home Medications:  Prior to Admission medications Medication Sig Start Date End Date Taking? Authorizing Provider   itraconazole (SPORANOX) 100 MG capsule  1/24/21   Historical Provider, MD   OLANZapine (ZYPREXA) 10 MG tablet take 1 tablet by mouth once daily 12/29/20   Historical Provider, MD   albuterol sulfate HFA (VENTOLIN HFA) 108 (90 Base) MCG/ACT inhaler Inhale 2 puffs into the lungs 4 times daily as needed for Wheezing 9/29/20   Hu Kay MD   FLUoxetine (PROZAC) 20 MG capsule Take 20 mg by mouth daily    Historical Provider, MD   hydrOXYzine (VISTARIL) 50 MG capsule Take 50 mg by mouth every 6 hours as needed for Itching    Historical Provider, MD   ibuprofen (ADVIL;MOTRIN) 400 MG tablet Take 400 mg by mouth every 6 hours as needed for Pain    Historical Provider, MD       REVIEW OF SYSTEMS    (2-9 systems for level 4, 10 or more for level 5)      Review of Systems   Constitutional: Negative for chills, diaphoresis, fatigue and fever. HENT: Negative for congestion and sore throat. Eyes: Negative for photophobia and visual disturbance. Respiratory: Negative for cough and shortness of breath. Cardiovascular: Negative for chest pain, palpitations and leg swelling. Gastrointestinal: Negative for abdominal pain, constipation, diarrhea, nausea and vomiting. Genitourinary: Negative for dysuria and hematuria. Musculoskeletal: Negative for arthralgias and myalgias. Skin: Positive for wound (superficial cuts). Negative for rash. Neurological: Negative for weakness, light-headedness, numbness and headaches. Psychiatric/Behavioral: Positive for decreased concentration, dysphoric mood, self-injury and suicidal ideas. Negative for hallucinations and sleep disturbance. The patient is not nervous/anxious.         PHYSICAL EXAM   (up to 7 for level 4, 8 or more for level 5)      INITIAL VITALS:   /71   Pulse 72   Temp 97.2 °F (36.2 °C) (Oral)   Resp 16   Ht 5' 7\" (1.702 m)   Wt 210 lb (95.3 kg)   SpO2 96%   BMI 32.89 kg/m² Physical Exam  Vitals and nursing note reviewed. Constitutional:       General: He is not in acute distress. Appearance: Normal appearance. He is normal weight. He is not toxic-appearing or diaphoretic. HENT:      Head: Normocephalic and atraumatic. Right Ear: External ear normal.      Left Ear: External ear normal.      Nose: Nose normal.      Mouth/Throat:      Mouth: Mucous membranes are moist.      Pharynx: Oropharynx is clear. Eyes:      General: No scleral icterus. Extraocular Movements: Extraocular movements intact. Conjunctiva/sclera: Conjunctivae normal.      Pupils: Pupils are equal, round, and reactive to light. Cardiovascular:      Rate and Rhythm: Normal rate and regular rhythm. Pulses: Normal pulses. Pulmonary:      Effort: Pulmonary effort is normal. No respiratory distress. Breath sounds: No stridor. Abdominal:      General: Abdomen is flat. There is no distension. Tenderness: There is no abdominal tenderness. There is no guarding or rebound. Musculoskeletal:         General: No swelling or tenderness. Normal range of motion. Cervical back: Normal range of motion and neck supple. No rigidity. Skin:     General: Skin is warm and dry. Capillary Refill: Capillary refill takes less than 2 seconds. Comments: Multiple superficial lacerations liver scabbed over on the ventral aspect of the forearms and bilateral thighs with no signs of erythema, warmth, tenderness, purulent discharge concerning for infection   Neurological:      General: No focal deficit present. Mental Status: He is alert and oriented to person, place, and time. Comments: Moving all extremities. Following commands. Psychiatric:         Attention and Perception: Attention and perception normal.         Mood and Affect: Affect normal. Mood is depressed. Speech: Speech normal.         Behavior: Behavior normal. Behavior is cooperative.          Thought Content: Thought content is not paranoid or delusional. Thought content includes suicidal ideation. Thought content does not include homicidal ideation. Thought content includes suicidal plan. Thought content does not include homicidal plan. Cognition and Memory: Cognition and memory normal.         Judgment: Judgment normal.         DIFFERENTIAL  DIAGNOSIS     PLAN (LABS / IMAGING / EKG):  Orders Placed This Encounter   Procedures    COVID-19, Rapid    XR CHEST PORTABLE    CBC Auto Differential    Comprehensive Metabolic Panel w/ Reflex to MG    ETHANOL    Urine Drug Screen       MEDICATIONS ORDERED:  No orders of the defined types were placed in this encounter. DDX: Suicidal ideation, histoplasma    DIAGNOSTIC RESULTS / EMERGENCY DEPARTMENT COURSE / MDM   LAB RESULTS:  Results for orders placed or performed during the hospital encounter of 01/27/22   COVID-19, Rapid    Specimen: Nasopharyngeal Swab   Result Value Ref Range    Specimen Description . NASOPHARYNGEAL SWAB     SARS-CoV-2, Rapid Not Detected Not Detected   CBC Auto Differential   Result Value Ref Range    WBC 8.9 3.5 - 11.3 k/uL    RBC 5.05 4.21 - 5.77 m/uL    Hemoglobin 14.4 13.0 - 17.0 g/dL    Hematocrit 42.0 40.7 - 50.3 %    MCV 83.2 82.6 - 102.9 fL    MCH 28.5 25.2 - 33.5 pg    MCHC 34.3 28.4 - 34.8 g/dL    RDW 12.9 11.8 - 14.4 %    Platelets 102 780 - 984 k/uL    MPV 11.8 8.1 - 13.5 fL    NRBC Automated 0.0 0.0 per 100 WBC    Differential Type NOT REPORTED     Seg Neutrophils 50 36 - 65 %    Lymphocytes 39 24 - 43 %    Monocytes 9 3 - 12 %    Eosinophils % 1 1 - 4 %    Basophils 1 0 - 2 %    Immature Granulocytes 0 0 %    Segs Absolute 4.52 1.50 - 8.10 k/uL    Absolute Lymph # 3.47 1.10 - 3.70 k/uL    Absolute Mono # 0.79 0.10 - 1.20 k/uL    Absolute Eos # 0.09 0.00 - 0.44 k/uL    Basophils Absolute 0.05 0.00 - 0.20 k/uL    Absolute Immature Granulocyte <0.03 0.00 - 0.30 k/uL    WBC Morphology NOT REPORTED     RBC Morphology NOT REPORTED     Platelet Estimate NOT REPORTED    Comprehensive Metabolic Panel w/ Reflex to MG   Result Value Ref Range    Glucose 100 (H) 70 - 99 mg/dL    BUN 12 6 - 20 mg/dL    CREATININE 0.86 0.70 - 1.20 mg/dL    Bun/Cre Ratio NOT REPORTED 9 - 20    Calcium 9.7 8.6 - 10.4 mg/dL    Sodium 141 135 - 144 mmol/L    Potassium 4.3 3.7 - 5.3 mmol/L    Chloride 105 98 - 107 mmol/L    CO2 22 20 - 31 mmol/L    Anion Gap 14 9 - 17 mmol/L    Alkaline Phosphatase 56 40 - 129 U/L    ALT 16 5 - 41 U/L    AST 15 <40 U/L    Total Bilirubin 0.18 (L) 0.3 - 1.2 mg/dL    Total Protein 6.9 6.4 - 8.3 g/dL    Albumin 4.5 3.5 - 5.2 g/dL    Albumin/Globulin Ratio 1.9 1.0 - 2.5    GFR Non-African American >60 >60 mL/min    GFR African American >60 >60 mL/min    GFR Comment          GFR Staging NOT REPORTED    ETHANOL   Result Value Ref Range    Ethanol <10 <10 mg/dL    Ethanol percent <0.010 <0.010 %   Urine Drug Screen   Result Value Ref Range    Amphetamine Screen, Ur NEGATIVE NEGATIVE    Barbiturate Screen, Ur NEGATIVE NEGATIVE    Benzodiazepine Screen, Urine NEGATIVE NEGATIVE    Cocaine Metabolite, Urine NEGATIVE NEGATIVE    Methadone Screen, Urine NEGATIVE NEGATIVE    Opiates, Urine NEGATIVE NEGATIVE    Phencyclidine, Urine NEGATIVE NEGATIVE    Propoxyphene, Urine NOT REPORTED NEGATIVE    Cannabinoid Scrn, Ur NEGATIVE NEGATIVE    Oxycodone Screen, Ur NEGATIVE NEGATIVE    Methamphetamine, Urine NOT REPORTED NEGATIVE    Tricyclic Antidepressants, Urine NOT REPORTED NEGATIVE    MDMA, Urine NOT REPORTED NEGATIVE    Buprenorphine Urine NOT REPORTED NEGATIVE    Test Information       Assay provides medical screening only. The absence of expected drug(s) and/or metabolite(s) may indicate diluted or adulterated urine, limitations of testing or timing of collection. IMPRESSION: 17-year-old male to female transition patient presenting for suicidal ideation and self-harm. Patient appears to be no distress nontoxic-appearing.   Patient has stable vital signs. No respiratory stress. Hemodynamically stable. Abdomen is benign with no peritoneal signs. No focal motor deficits. Patient does have multiple nonerythematous, or infected superficial lacerations to the dorsal aspect of the left forearm and bilateral thighs. Concern for above differential diagnosis. Will order a chest x-ray just to make sure that patient histoplasmosis not reoccurred however patient is having any symptoms. Otherwise if chest x-ray is nonconcerning, patient will be medically cleared and will have social work see the patient and plan for transfer centrals. RADIOLOGY:  XR CHEST PORTABLE    Result Date: 1/27/2022  EXAMINATION: ONE XRAY VIEW OF THE CHEST 1/27/2022 10:49 pm COMPARISON: CT chest 11/04/2021 HISTORY: ORDERING SYSTEM PROVIDED HISTORY: hx of histoplasma, not on meds TECHNOLOGIST PROVIDED HISTORY: hx of histoplasma, not on meds FINDINGS: The cardiomediastinal silhouette is normal in size and contour. The lungs are clear. No pleural effusion or pneumothorax is present. No acute cardiopulmonary process       EKG  None    All EKG's are interpreted by the Emergency Department Physician who either signs or Co-signs this chart in the absence of a cardiologist.    EMERGENCY DEPARTMENT COURSE:  ED Course as of 01/28/22 0747   Thu Jan 27, 2022   2330 CBC is non-concerning. [CS]   2314 CXR is non-concerning [CS]   Fri Jan 28, 2022   0012 Ethanol is negative. [CS]   0012 SARS-CoV-2, Rapid: Not Detected [CS]   0012 MADIHA is negative. [CS]   0038 Lab work is unremarkable. Patient is medically cleared. [CS]      ED Course User Index  [CS] Krystle Barakat DO     Pt transferred to [facility] without incident. PROCEDURES:  None    CONSULTS:  None    CRITICAL CARE:  Please see attending note    FINAL IMPRESSION      1.  Depression with suicidal ideation          DISPOSITION / PLAN     DISPOSITION Decision To Transfer 01/28/2022 12:03:54 AM      PATIENT REFERRED TO:  No follow-up provider specified.     DISCHARGE MEDICATIONS:  Discharge Medication List as of 1/28/2022  2:58 AM          Shaila Dickerson DO  Emergency Medicine Resident    (Please note that portions of thisnote were completed with a voice recognition program.  Efforts were made to edit the dictations but occasionally words are mis-transcribed.)        Shaila Dickerson DO  Resident  01/28/22 7993

## 2022-01-28 NOTE — PROGRESS NOTES
Behavioral Services  Medicare Certification Upon Admission    I certify that this patient's inpatient psychiatric hospital admission is medically necessary for:    [x] (1) Treatment which could reasonably be expected to improve this patient's condition,       [x] (2) Or for diagnostic study;     AND     [x](2) The inpatient psychiatric services are provided while the individual is under the care of a physician and are included in the individualized plan of care.     Estimated length of stay/service 3-7     Plan for post-hospital care home with outpatient community mental     Electronically signed by Juan Solano MD on 1/28/2022 at 10:43 AM

## 2022-01-28 NOTE — ED TRIAGE NOTES
Patient arrived to ED for request for a psych eval.  Patient reports he has been having constant depression and suicidal thoughts that have been in a manic state in which's frequency is every hour for the past two weeks. Patient presented his forearms bilateral and legs in which displayed self-inflicted harm by a razor. Patient reports his last tetanus shot was in 2017. Patient denies wanting to die, but admits to wanting to only harm self. Patient is AOx4, cooperative, and smiling at this time. All belongings are checked with security and patient is changed out into paper scrubs.   Dr. Adonis Rivas is at bedside for eval.

## 2022-01-28 NOTE — ED PROVIDER NOTES
[facility]     Emergency Department     Faculty Attestation        I performed a history and physical examination of the patient and discussed management with the resident. I reviewed the residents note and agree with the documented findings and plan of care. Any areas of disagreement are noted on the chart. I was personally present for the key portions of any procedures. I have documented in the chart those procedures where I was not present during the key portions. I have reviewed the emergency nurses triage note. I agree with the chief complaint, past medical history, past surgical history, allergies, medications, social and family history as documented unless otherwise noted below. For mid-level providers such as nurse practitioners as well as physicians assistants:    I have personally seen and evaluated the patient. I find the patient's history and physical exam are consistent with NP/PA documentation. I agree with the care provided, treatment rendered, disposition, & follow-up plan. Additional findings are as noted. Vital Signs: /81   Pulse 70   Temp 97.7 °F (36.5 °C) (Oral)   Resp 16   Ht 5' 7\" (1.702 m)   Wt 210 lb (95.3 kg)   SpO2 96%   BMI 32.89 kg/m²   PCP:  Raya Fox    Pertinent Comments:     Patient with underlying psychiatric bipolar. Has been compliant with medications but states had a manic phase recently which spiraled into depression. Is suicidal with plan to slit wrist.  Awake alert and cooperative.   No medical complaints      Critical Care  None          Tatyana Acosta MD    Attending Emergency Medicine Physician              Grace Waterman MD  01/27/22 4874

## 2022-01-28 NOTE — H&P
Department of Psychiatry  Attending Physician Psychiatric Assessment     Reason for Admission to Psychiatric Unit:  Threat to self requiring 24 hour professional observation  Failure of outpatient psychiatry treatment so that the beneficiary requires 24 hour professional observation and care  Concerns about patient's safety in the community    CHIEF COMPLAINT: Suicidal ideation    History obtained from: Patient, electronic medical record          HISTORY OF PRESENT ILLNESS:    Agustin Villanueva is a 19 y.o. male who has a past medical history of bipolar and schizoaffective disorder. Patient presented to the ED   \"for a psychiatric evaluation. Patient states that he has a history of Bipolar Disorder. Patient reports that earlier today he was having suicidal thoughts. When this SW asked the patient if he is currently feeling suicidal the patient states, \"I guess. \" Patient reports that he also cut his arms and legs today. Patient states that he was not trying to kill himself when he was cutting. Patient reports that he has a history of self cutting. Patient states that currently he just feels very depressed and wants a medication adjustment. On top of the Depression, the patient states that he does have manic episodes and with symptoms of impulsive cutting and spending money. Patient denied any current/history of psychosis or anxiety. Patient reports that he feels that his psychiatric medications are not effective and states he is compliant with his Prozac and Lamictal. Patient reports that he is connected with [facility]. The patient denied ever attempting suicide in the past. The patient denied any current drug or alcohol use. Patient states that he wants to be admitted inpatient for a medication adjustment. Plan to contact [facility] after patient is medically cleared. \"              Patient reports currently following with [facility] outpatient however has not been following up the past few weeks.   Patient endorses current medication includes Prozac, Zyprexa and Lamictal.  Patient reports that all three were helpful in the past however states that they have been less helpful as of recently. Patient reports that she most recently had the addition of Lamictal.  After discussing with physician, will restart Prozac and Zyprexa in addition to lithium and discontinue Lamictal.    When discussing reasons for admission patient reports that as of recently she began feeling like a zombie which she attributes to an increase in medications and states that she harmed herself to \"feel something\". Patient reports compliance with medication in the community reports desire to maintain compliant while working with doctors staff and patient to adjust medications. Patient presents with extensive cuts on the forearms bilaterally and legs. Patient reports that she was feeling suicidal last night however did not have a plan at that time to kill herself. Patient endorses cutting her arms and legs prior to admission. Patient endorses depression as a 6 out of 10 on a 1-10 scale (one being low and 10 being high). Patient denies current plan how to harm herself and is able to contract for safety on the unit. Patient endorses a history of generalized anxiety disorder however denies anxiety at this time. Patient endorses a history of panic attacks stating last time she had one was in 2020. Patient also endorses significant history of bay reporting she has gone to 3 days without needing sleep with increased energy and impulsive behavior. Patient reports last time this happened was 2 to 3 days ago. Patient also endorses a history of visual hallucinations reported she sees shadow figures walking around. Reports last time this happened was a few days ago. Patient denies auditory hallucinations.   Patient endorses delusions of reference stating that she feels people are watching her and talking about her and recognizes as irrational at times however is unable to prevent the thoughts. Patient also endorses significant symptoms of borderline personality disorder. At this time, the patient is not appropriate for a lower level of care. There is risk of decompensation and patient warrants further hospitalization for safety and stabilization. When discussing alcohol consumption patient denies all use. Patient does endorse smoking marijuana occasionally however not often. UDS was negative upon admission. History of head trauma: [] Yes [x] No    History of seizures: [] Yes [x] No    History of violence or aggression: [] Yes [x] No         PSYCHIATRIC HISTORY:  [x] Yes [] No    Currently follows with Erwin Naik previous lifetime suicide attempts, reports \"cutting deep\" to and life in the past  Does not report previous psychiatric hospital admissions    Home Medication Compliance: [x] Yes [] No    Past psychiatric medications includes: Zyprexa, Prozac, Lamictal    Adverse reactions from psychotropic medications: [x] Yes [] No, reports feeling like a \"zombie\". Lifetime Psychiatric Review of Systems         Depression: Endorses depression as a 6 out of 10 on a 1-10 scale. Patient reports that recent episode of depression has been approximately 3 weeks. Patient reports that it has been a constant vera for the last 10 years on and off. Patient reports that she has been sleeping more, energy is up and down, decrease in concentration and appetite. Patient also reports struggling with feelings of guilt and worthlessness. Patient endorses a history of suicide attempts but cutting herself \"deep\". Anxiety: Endorses history of generalized anxiety disorder however denies current feelings of anxiety. Panic Attacks: Endorses history of. Patient reports last time she had a panic attack was in 2020.   Patient reports that during panic attack she paces, has heart palpitations, feels nauseous and has thrown up, experiences excess sweating and fear of dying/going crazy. Katie or Hypomania: Endorses significant history of manic episodes. Reports going 2 to 3 days without needing sleep with increased impulsive activity. Patient reports last time this happened was 2 to 3 days ago. During times no sleep patient reports that she has  impulsiveactions such as spending a lot of money and recently cut off eyebrows. During these times she reports having decreased judgment, being easily distracted, has a need for less sleep, has elevated mood and racing thoughts. Phobias: Denies     Obsessions and Compulsions: Denies     Body or Vocal Tics: Denies     Visual Hallucinations: Endorses seeing shadow figures walking. Reports last time was a few days ago. Auditory Hallucinations: Denies     Delusions/Paranoia: Endorses delusions of reference reporting she feels people are watching her and talking about her. PTSD: Endorses a history of emotional and sexual trauma however dismisses as \"things happen\". Denies dreams and flashbacks. Personality disorder: Endorses significant symptoms of borderline personality disorder including fear of abandonment and rejection in relationships when things are going good. Reports unstable relationships. Feelings of chronic emptiness, low self-esteem, intense anger outbursts, self damaging behavior, and labile mood and impulsivity. Patient was observed smiling while telling staff about cutting her wrists.     Past Medical History:        Diagnosis Date    Anxiety     Asthma     Depression     Histoplasmosis     following with Dr. Fabrice Medina Multiple lung nodules on CT     Schizoaffective disorder [facility])        Past Surgical History:        Procedure Laterality Date    CT NEEDLE BIOPSY LUNG PERCUTANEOUS  2/10/2021    CT NEEDLE BIOPSY LUNG PERCUTANEOUS 2/10/2021 [facility] CT SCAN    HC PICC LINE DOUBLE LUMEN  10/15/2020         WISDOM TOOTH EXTRACTION      While in high school       Allergies:  Patient has no known allergies. Social History:     Born in: [address]  Family: Reports being raised by her father and stepmother. Reports biological mother would come and go throughout her life. Reports being close to all three parents. States that she also has three sisters who she is close with and one brother who passed away. Highest Level of Education: Some college  Occupation: Currently, no income  Marital Status: Single  Children: No children  Residence: Living with biological mother. Patient reports this is a safe environment. Stressors: Mental health, occupation, suicidal ideation  Patient Assets/Supportive Factors: Family, outpatient mental health treatment         DRUG USE HISTORY  Social History     Tobacco Use   Smoking Status Current Every Day Smoker    Packs/day: 0.50    Years: 4.00    Pack years: 2.00    Types: Cigarettes   Smokeless Tobacco Never Used     Social History     Substance and Sexual Activity   Alcohol Use Not Currently     Social History     Substance and Sexual Activity   Drug Use Never       When discussing alcohol consumption patient denies all use. Patient does endorse smoking marijuana occasionally however not often. UDS was negative upon admission. LEGAL HISTORY:   HISTORY OF INCARCERATION: [] Yes [x] No    Family History:       Problem Relation Age of Onset    Stroke Mother        Psychiatric Family History  Patient endorses reports biological mother psychiatric family history. Has bipolar disorder and schizophrenia    Suicides in family: [] Yes [x] No    Substance use in family: [] Yes [x] No         PHYSICAL EXAM:  Vitals:  /69   Pulse 59   Temp 97.6 °F (36.4 °C) (Oral)   Resp 14   Ht 5' 7\" (1.702 m)   Wt 210 lb (95.3 kg)   SpO2 100%   BMI 32.89 kg/m²   Pain Level: Denies    LABS:  Labs reviewed: [x] Yes  Last EKG in EMR reviewed: [x] Yes          Review of Systems   Constitutional: Negative for chills and weight loss.    HENT: Negative for ear pain and nosebleeds. Eyes: Negative for blurred vision and photophobia. Respiratory: Negative for cough, shortness of breath and wheezing. Cardiovascular: Negative for chest pain and palpitations. Gastrointestinal: Negative for abdominal pain, diarrhea and vomiting. Genitourinary: Negative for dysuria and urgency. Musculoskeletal: Negative for falls and joint pain. Skin: Negative for itching and rash. Multiple cuts bilaterally on forearms and legs  Neurological: Negative for tremors, seizures and weakness. Endo/Heme/Allergies: Does not bruise/bleed easily. Physical Exam:   Constitutional:  Appears well-developed and well-nourished, no acute distress. HENT:   Head: Normocephalic and atraumatic. Eyes: Conjunctivae are normal. Right eye exhibits no discharge. Left eye exhibits no discharge. No scleral icterus. Neck: Normal range of motion. Neck supple. Pulmonary/Chest:  No respiratory distress or accessory muscle use, no wheezing. Cardiac: Regular rate and rhythm. Abdominal: Soft. Non-tender. Exhibits no distension. Musculoskeletal: Normal range of motion. Exhibits no edema. Neurological: cranial nerves II-XII grossly in tact, normal gait and station. Skin: Skin is warm and dry. Patient is not diaphoretic. No erythema. Cuts healing, multiple scars    Mental Status Examination:    Level of consciousness: Awake and alert  Appearance:  Appropriate attire, seated on bed, fair grooming   Behavior/Motor: Approachable, no psychomotor abnormalities noted  Attitude toward examiner:  Cooperative, attentive, good eye contact  Speech: Normal rate, volume, and tone. Mood: \"Down\"  Affect:  Incongruent, patient observed laughing and smiling inappropriately at times during interview. Thought processes:   Coherent, linear  Thought content: Active suicidal ideations, without current plan or intent, contracts for safety on the unit.                Denies homicidal ideations               Endorses history of visual hallucinations, denies auditory hallucinations              Endorses delusions              Denies paranoia  Cognition:  Oriented to self, location, time, situation  Concentration: Clinically adequate  Memory: Intact  Insight &Judgment: Poor         DSM-5 Diagnosis    Principal Problem: Schizoaffective disorder, bipolar type ([facility]      Borderline Personality disorder    Psychosocial and Contextual factors:  Financial Endorses  Occupational Endorses  Relationship Endorses  Legal Denies  Living situation Denies  Educational Denies    Past Medical History:   Diagnosis Date    Anxiety     Asthma     Depression     Histoplasmosis     following with Dr. Ronnie Moore Multiple lung nodules on CT     Schizoaffective disorder ([facility]         TREATMENT PLAN    Continue inpatient psychiatric treatment. Home medications reviewed. Problem list updated. Restart home medication of Prozac 20 mg and Zyprexa 10 mg  Order: Lithium 300 mg  Discontinue: Lamictal   Provided education about follow-up with DBT outpatient  Monitor need and frequency of PRN medications. Attempt to develop insight. Follow-up daily while inpatient. Reviewed risks and benefits as well as potential side effects with patient. CONSULTS [x] Yes [x] No      Risk Management: close watch per standard protocol      Psychotherapy: participation in milieu and group and individual sessions with Attending Physician,  and Physician Assistant/CNP      Estimated length of stay:  2-14 days      GENERAL PATIENT/FAMILY EDUCATION  Patient will understand basic signs and symptoms, patient will understand benefits/risks and potential side effects from proposed medications, and patient will understand their role in recovery. Family is not active in patient's care.    Patient assets that may be helpful during treatment include: Intent to participate and engage in treatment, sufficient fund of knowledge and intellect to understand and utilize treatments. Goals:    1) Remission of suicidal ideation. 2) Stabilization of symptoms prior to discharge. 3) Establish efficacy and tolerability of medications. Behavioral Services  Medicare Certification     Admission Day 1  I certify that this patient's inpatient psychiatric hospital admission is medically necessary for:    x (1) treatment which could reasonably be expected to improve this patient's condition, or    x (2) diagnostic study or its equivalent. Time Spent: 60 minutes    Molly Barrera is a 19 y.o. male being evaluated face to face    --[address] [facility],[address], APRN - CNP on 1/28/2022 at 12:20 PM    An electronic signature was used to authenticate this note. I independently saw and evaluated the patient. I reviewed the nurse practitioners documentation above. Any additional comments or changes to the nurse practitioners documentation are stated below otherwise agree with assessment. Plan will be as follows:  Patient reported feeling unsafe, having intrusive suicidal thoughts and was worried that they may act on those. Also reported increased cutting behavior. Extensive superficial cuts on bilateral forearms. Patient reports things have not been better since starting Lamictal.  After discussion of risk benefits and alternatives mutually agreed to start lithium. Did explore symptoms of borderline personality disorder. Patient certainly has borderline traits. Encourage patient upon discharge to explore recommendations from their therapist and psychiatrist as potential benefit from dialectical behavior therapy may be there.   Electronically signed by Comfort Mathias MD on 1/28/2022 at 2:43 PM

## 2022-01-28 NOTE — PLAN OF CARE
Problem: Altered Mood, Depressive Behavior:  Goal: Ability to disclose and discuss suicidal ideas will improve  Description: Ability to disclose and discuss suicidal ideas will improve  1/28/2022 0938 by Marlo Hoang, RN  Outcome: Met This Shift  Note: Patient reports suicidal ideations when admitted, feeling a little better although reports just waking up, pleasant when interacted with, feeling a little tired, and thus far has been isolative to room,     Problem: Suicide risk  Goal: Provide patient with safe environment  Description: Provide patient with safe environment  1/28/2022 0938 by Marlo Hoang, RN  Outcome: Met This Shift  Note: Every 15 minute and random safety checks maintained    Problem: Altered Mood, Depressive Behavior:  Goal: Absence of self-harm  Description: Absence of self-harm       Problem: Tobacco Use:  Goal: Inpatient tobacco use cessation counseling participation  Description: Inpatient tobacco use cessation counseling participation

## 2022-01-28 NOTE — CARE COORDINATION
BH Psychosocial Assessment    Current Level of Psychosocial Functioning     Independent  X  Dependent    Minimal Assist     Comments:      Psychosocial High Risk Factors (check all that apply)    Unable to obtain meds   Chronic illness/pain    Substance abuse   Lack of Family Support   Financial stress   Isolation   Inadequate Community Resources  Suicide attempt(s) X  Not taking medications   Victim of crime   Developmental Delay  Unable to manage personal needs    Age 72 or older   Homeless  No transportation   Readmission within 30 days  Unemployment  Traumatic Event    Family/Supports identified:  Pt reports having supportive family    Sexual Orientation:      Patient Strengths: Stable housing, linked with [facility], Stable housing. Patient Barriers: Lack of coping skills for mental health    Safety plan: NA    CMHC/MH history: Linked with Tavo Emerson Therapist Jack Kee and Mally0 E Ziyad Yanez of Care:  medication management, group/individual therapies, family meetings, psycho -education, treatment team meetings to assist with stabilization    Initial Discharge Plan: To be determined by the doctor     Clinical Summary:  Pt is a 19year old male who started to transition to female in October 2021 who has been admitted to the [facility] for safety. Pt denies suicidal, homicidal ideations, and hallucinations at the time of assessment. Superficial cuts were observed on both of pts arms. Pt reports using cutting as a way to release emotions, something that pt started at the age of 13 and it became an addiction. Pt reports feeling fine all day on 1/27/22 cleaning and watching \"The Notebook\" when all the sudden pt reports feeling \"numb\" so pt started to cut to feel \"something\" Pt minimized the cutting on arms, but reports an attempt at suicide in highschool by cutting \"deeply\" into thighs Pt was smiling, and cooperative during assessment.

## 2022-01-28 NOTE — BH NOTE
[facility]  Admission Note     Admission Type:   Admission Type: Voluntary    Reason for admission:  Reason for Admission: SUicidal to cut self    PATIENT STRENGTHS:  Strengths: Medication Compliance,Motivated,Positive Support    Patient Strengths and Limitations:  Limitations: Tendency to isolate self,External locus of control,Inappropriate/potentially harmful leisure interests    Addictive Behavior:   Addictive Behavior  In the past 3 months, have you felt or has someone told you that you have a problem with:  : None  Do you have a history of Chemical Use?: No  Do you have a history of Alcohol Use?: No  Do you have a history of Street Drug Abuse?: No  Histroy of Prescripton Drug Abuse?: No    Medical Problems:   Past Medical History:   Diagnosis Date    Anxiety     Asthma     Depression     Histoplasmosis     following with Dr. Roman Check Multiple lung nodules on CT     Schizoaffective disorder ([facility]        Status EXAM:  Status and Exam  Normal: No  Facial Expression: Flat  Affect: Inappropriate  Level of Consciousness: Alert  Mood:Normal: No  Mood: Depressed,Anxious,Irritable  Motor Activity:Normal: Yes  Interview Behavior: Evasive,Cooperative  Preception: [place] to Person,[place] to Time,[place] to Place,[place] to Situation  Attention:Normal: No  Attention: Distractible  Thought Processes: Blocking  Thought Content:Normal: No  Thought Content: Compulsions,Preoccupations  Hallucinations: None  Delusions: No  Memory:Normal: Yes  Insight and Judgment: No  Insight and Judgment: Poor Judgment,Poor Insight  Present Suicidal Ideation: Yes  Present Homicidal Ideation: No    Tobacco Screening:  Practical Counseling, on admission, tucker X, if applicable and completed (first 3 are required if patient doesn't refuse):            ( )  Recognizing danger situations (included triggers and roadblocks)                    ( )  Coping skills (new ways to manage stress, exercise, relaxation techniques, changing routine, distraction)                                                           ( )  Basic information about quitting (benefits of quitting, techniques in how to quit, available resources  ( ) Referral for counseling faxed to [facility]                                           (X ) Patient refused counseling  ( ) Patient has not smoked in the last 30 days    Metabolic Screening:    No results found for: LABA1C    No results found for: CHOL  No results found for: TRIG  No results found for: HDL  No components found for: LDLCAL  No results found for: LABVLDL      Body mass index is 32.89 kg/m². BP Readings from Last 2 Encounters:   01/28/22 (!) 144/71   01/28/22 128/71           Pt admitted with followings belongings:  Dental Appliances: None  Vision - Corrective Lenses: None  Hearing Aid: None  Jewelry: None  Body Piercings Removed: N/A  Clothing: [employer] / coat,Pants,Shirt,Socks,Undergarments (Comment)  Were All Patient Medications Collected?: Not Applicable  Other Valuables: Cell phone,Other (Comment) (vaporizer pen)       Patient oriented to surroundings and program expectations and copy of patient rights given. Received admission packet:  Yes. Consents reviewed, signed Yes. Refused No. Patient verbalize understanding:  Yes. Patient education on precautions: Yes    Patient was voluntary admit from [facility] ED. Patient was having suicidal thoughts to cut self. Patient denies any homicidal thoughts. Patient denies any audio or visual hallucinations. Patient was offered food and water. Patient was wanded for safety. Patient was changed into hospital attire. Patient denies any additional needs at this time. 15 minute safety checks were initiated.                     Aline Brown RN

## 2022-01-28 NOTE — PLAN OF CARE
[facility]  Initial Interdisciplinary Treatment Plan NO      Original treatment plan Date & Time: 1/28/2022 0934    Admission Type:  Admission Type: Voluntary    Reason for admission:   Reason for Admission: SUicidal to cut self    Estimated Length of Stay:  5-7days  Estimated Discharge Date: to be determined by physician    PATIENT STRENGTHS:  Patient Strengths:Strengths: Medication Compliance,Motivated,Positive Support  Patient Strengths and Limitations:Limitations: Tendency to isolate self,External locus of control,Inappropriate/potentially harmful leisure interests  Addictive Behavior: Addictive Behavior  In the past 3 months, have you felt or has someone told you that you have a problem with:  : None  Do you have a history of Chemical Use?: No  Do you have a history of Alcohol Use?: No  Do you have a history of Street Drug Abuse?: No  Histroy of Prescripton Drug Abuse?: No  Medical Problems:  Past Medical History:   Diagnosis Date    Anxiety     Asthma     Depression     Histoplasmosis     following with Dr. Gloria Lerma    Multiple lung nodules on CT     Schizoaffective disorder ([facility] [address]      Status EXAM:Status and Exam  Normal: No  Facial Expression: Flat  Affect: Inappropriate  Level of Consciousness: Alert  Mood:Normal: No  Mood: Depressed,Anxious,Irritable  Motor Activity:Normal: Yes  Interview Behavior: Evasive,Cooperative  Preception: [place] to Person,[place] to Time,[place] to Place,[place] to Situation  Attention:Normal: No  Attention: Distractible  Thought Processes: Blocking  Thought Content:Normal: No  Thought Content: Compulsions,Preoccupations  Hallucinations: None  Delusions: No  Memory:Normal: Yes  Insight and Judgment: No  Insight and Judgment: Poor Judgment,Poor Insight  Present Suicidal Ideation: Yes  Present Homicidal Ideation: No    EDUCATION:   Learner Progress Toward Treatment Goals: reviewed group plans and strategies for care    Method:group therapy, medication compliance, individualized assessments and care planning    Outcome: needs reinforcement    PATIENT GOALS: to be discussed with patient within 72 hours    PLAN/TREATMENT RECOMMENDATIONS:     continue group therapy , medications compliance, goal setting, individualized assessments and care, continue to monitor pt on unit      SHORT-TERM GOALS:   Time frame for Short-Term Goals: 5-7 days    LONG-TERM GOALS:  Time frame for Long-Term Goals: 6 months  Members Present in Team Meeting: See Signature Sheet    Bryanna Torres

## 2022-01-28 NOTE — ED NOTES
Patient prefers [address] [place] [address] pronouns     [facility] [employer], RN  01/27/22 0621

## 2022-01-29 PROCEDURE — APPSS30 APP SPLIT SHARED TIME 16-30 MINUTES

## 2022-01-29 PROCEDURE — 99232 SBSQ HOSP IP/OBS MODERATE 35: CPT | Performed by: PSYCHIATRY & NEUROLOGY

## 2022-01-29 PROCEDURE — 6370000000 HC RX 637 (ALT 250 FOR IP): Performed by: PSYCHIATRY & NEUROLOGY

## 2022-01-29 PROCEDURE — 1240000000 HC EMOTIONAL WELLNESS R&B

## 2022-01-29 RX ADMIN — NICOTINE POLACRILEX 2 MG: 2 GUM, CHEWING BUCCAL at 12:52

## 2022-01-29 RX ADMIN — OLANZAPINE 10 MG: 10 TABLET, FILM COATED ORAL at 21:12

## 2022-01-29 RX ADMIN — TRAZODONE HYDROCHLORIDE 50 MG: 50 TABLET ORAL at 21:12

## 2022-01-29 RX ADMIN — NICOTINE POLACRILEX 2 MG: 2 GUM, CHEWING BUCCAL at 15:39

## 2022-01-29 RX ADMIN — NICOTINE POLACRILEX 2 MG: 2 GUM, CHEWING BUCCAL at 10:10

## 2022-01-29 RX ADMIN — FLUOXETINE HYDROCHLORIDE 20 MG: 20 CAPSULE ORAL at 08:39

## 2022-01-29 RX ADMIN — NICOTINE POLACRILEX 2 MG: 2 GUM, CHEWING BUCCAL at 17:44

## 2022-01-29 RX ADMIN — NICOTINE POLACRILEX 2 MG: 2 GUM, CHEWING BUCCAL at 19:47

## 2022-01-29 RX ADMIN — LITHIUM CARBONATE 300 MG: 300 TABLET, FILM COATED, EXTENDED RELEASE ORAL at 08:40

## 2022-01-29 RX ADMIN — LITHIUM CARBONATE 300 MG: 300 TABLET, FILM COATED, EXTENDED RELEASE ORAL at 21:12

## 2022-01-29 NOTE — PLAN OF CARE
Problem: Altered Mood, Depressive Behavior:  Goal: Ability to disclose and discuss suicidal ideas will improve  Description: Ability to disclose and discuss suicidal ideas will improve  1/28/2022 2216 by Haley Arriola  Outcome: Ongoing  1/28/2022 0938 by Nicko Sim RN  Outcome: Met This Shift  Note: Patient reports suicidal ideations when admitted, feeling a little better although reports just waking up, pleasant when interacted with, feeling a little tired, and thus far has been isolative to room,   1/28/2022 0934 by ADRIENNE Rodriguez  Outcome: Ongoing  Patient denies suicidal thoughts at this time     Problem: Altered Mood, Depressive Behavior:  Goal: Absence of self-harm  Description: Absence of self-harm  1/28/2022 2216 by Haley Arriola  Outcome: Ongoing  1/28/2022 0934 by ADRIENNE Rodriguez  Outcome: Ongoing   No self-harm thoughts or behaviors

## 2022-01-29 NOTE — PLAN OF CARE
[facility]  Day 3 Interdisciplinary Treatment Plan NOTE    Review Date & Time: 1/29/2022   1329    Admission Type:   Admission Type: Voluntary    Reason for admission:  Reason for Admission: SUicidal to cut self  Estimated Length of Stay: 5-7 days  Estimated Discharge Date Update: to be determined by physician    PATIENT STRENGTHS:  Patient Strengths Strengths: Medication Compliance,Motivated,Positive Support  Patient Strengths and Limitations:Limitations: Multiple barriers to leisure interests,General negative or hopeless attitude about future/recovery  Addictive Behavior:Addictive Behavior  In the past 3 months, have you felt or has someone told you that you have a problem with:  : None  Do you have a history of Chemical Use?: No  Do you have a history of Alcohol Use?: No  Do you have a history of Street Drug Abuse?: No  Histroy of Prescripton Drug Abuse?: No  Medical Problems:  Past Medical History:   Diagnosis Date    Anxiety     Asthma     Depression     Histoplasmosis     following with Dr. Yakov Landeros    Multiple lung nodules on CT     Schizoaffective disorder ([facility] [address]        Risk:  Fall RiskTotal: 65  Vernon Scale Vernon Scale Score: 22  BVC    Change in scores no Changes to plan of Care no    Status EXAM:   Status and Exam  Normal: No  Facial Expression: Flat  Affect: Appropriate  Level of Consciousness: Alert  Mood:Normal: No  Mood: Anxious,Depressed  Motor Activity:Normal: Yes  Interview Behavior: Cooperative  Preception: [place] to Person,[place] to Time,[place] to Place,[place] to Situation  Attention:Normal: No  Attention: Distractible  Thought Processes: Circumstantial  Thought Content:Normal: No  Thought Content: Preoccupations  Hallucinations: None  Delusions: No  Memory:Normal: Yes  Insight and Judgment: No  Insight and Judgment: Poor Judgment  Present Suicidal Ideation: No  Present Homicidal Ideation: No    Daily Assessment Last Entry:   Daily Sleep (WDL): Within Defined Limits         Patient Currently in Pain: No       Patient Monitoring:  Frequency of Checks: 4 times per hour, close    Psychiatric Symptoms:   Depression Symptoms  Depression Symptoms: Loss of interest,Isolative  Anxiety Symptoms  Anxiety Symptoms: Generalized  Katie Symptoms  Katie Symptoms: No problems reported or observed. Psychosis Symptoms  Delusion Type: No problems reported or observed. Suicide Risk CSSR-S:  1) Within the past month, have you wished you were dead or wished you could go to sleep and not wake up? : Yes  2) Have you actually had any thoughts of killing yourself? : Yes  3) Have you been thinking about how you might kill yourself? : Yes  5) Have you started to work out or worked out the details of how to kill yourself? Do you intend to carry out this plan? : No  6) Have you ever done anything, started to do anything, or prepared to do anything to end your life?: No  Change in Result NO Change in Plan of care NO      EDUCATION:   EDUCATION:   Learner Progress Toward Treatment Goals: Reviewed results and recommendations of this team, Reviewed group plan and strategies, Reviewed signs, symptoms and risk of self harm and violent behavior, Reviewed goals and plan of care    Method:small group, individual verbal education    Outcome:verbalized by patient, but needs reinforcement to obtain goals    PATIENT GOALS:  Short term:Abscence of self-harm; Long term: Stable housing; Increased overall sense of independence; Cain-term reduction of symptoms of depression;  Abscence of self-harm    PLAN/TREATMENT RECOMMENDATIONS UPDATE: continue with group therapies, increased socialization, continue planning for after discharge goals, continue with medication compliance    SHORT-TERM GOALS UPDATE:   Time frame for Short-Term Goals: 5-7 days    LONG-TERM GOALS UPDATE:   Time frame for Long-Term Goals: 6 months  Members Present in Team Meeting: See Signature Sheet    Daryl Richey

## 2022-01-29 NOTE — PROGRESS NOTES
Daily Progress Note  1/29/2022    Patient Name: Merrill Piedra    CHIEF COMPLAINT: Suicidal ideation         SUBJECTIVE:      Patient is seen today for a follow up assessment. Patient has been compliant with scheduled medications at this time and has not required emergency medications in the past 24 hours. When approached for interview patient presents with an affect that is more bright today however behavior was slightly hyperactive. Patient endorses improvement in depression and anxiety at this time however states yesterday he was having thoughts of wanting to kill himself yesterday. Patient states that today he is able to contract for safety on the unit with no plan or intention to harm himself. Patient denies visual and auditory hallucinations. When discussing paranoia patient reports that last night he was feeling paranoid because the door was cracked and having feelings that people are watching him. Discussion about patient safety on the unit took place and he reports knowing that he is safe. Psychotherapy was conducted regarding coping with feelings of paranoia patient was receptive. Patient endorses desire to follow-up with DBT treatment outpatient was encouraged to follow-up with social work to set up outpatient treatment. Patient was unable to contract for safety and community at this time related to mood swings and worry about coping in the community. Patient was encouraged to continue with medication compliance, continue to work with groups and staff to develop stronger coping skills and to set up [address] treatment in the community to address borderline personality traits. At this time, the patient is not appropriate for a lower level of care. There is risk of decompensation and patient warrants further hospitalization for safety and stabilization.     Appetite:  [x] Adequate/Unchanged  [] Increased  [] Decreased      Sleep:       [] Adequate/Unchanged  [x] Fair  [] Poor      Group Attendance on Unit:   [x] Yes  [] Selectively    [] No    Medication Side Effects: Denies         Mental Status Exam  Level of consciousness: Alert and awake   Appearance: Appropriate attire for setting, seated in chair, with good  grooming and hygiene   Behavior/Motor: Approachable, no psychomotor abnormalities, some hyperactivity noted  Attitude toward examiner: Cooperative, attentive, good eye contact  Speech: Slightly pressured rate, normal volume and well articulated   Mood: \"good\"  Affect: Mood congruent, more bright  Thought processes: linear, goal-directed and coherent   Thought content:  Denies homicidal ideation  Suicidal Ideation: Reports improvement in suicidal ideations, contracts for safety on the unit. Delusions: No evidence of delusions. Perceptual Disturbance:  Patient does not appear to be responding to internal stimuli. Cognition: Oriented to self, location, time, and situation  Memory: intact  Insight: fair   Judgement: fair       Data   height is 5' 7\" (1.702 m) and weight is 210 lb (95.3 kg). Her oral temperature is 98.4 °F (36.9 °C). Her blood pressure is 143/78 (abnormal) and her pulse is 90. Her respiration is 14 and oxygen saturation is 100%.    Labs:   Admission on 01/27/2022, Discharged on 01/28/2022   Component Date Value Ref Range Status    WBC 01/27/2022 8.9  3.5 - 11.3 k/uL Final    RBC 01/27/2022 5.05  4.21 - 5.77 m/uL Final    Hemoglobin 01/27/2022 14.4  13.0 - 17.0 g/dL Final    Hematocrit 01/27/2022 42.0  40.7 - 50.3 % Final    MCV 01/27/2022 83.2  82.6 - 102.9 fL Final    MCH 01/27/2022 28.5  25.2 - 33.5 pg Final    MCHC 01/27/2022 34.3  28.4 - 34.8 g/dL Final    RDW 01/27/2022 12.9  11.8 - 14.4 % Final    Platelets 71/89/7107 250  138 - 453 k/uL Final    MPV 01/27/2022 11.8  8.1 - 13.5 fL Final    NRBC Automated 01/27/2022 0.0  0.0 per 100 WBC Final    Differential Type 01/27/2022 NOT REPORTED   Final    Seg Neutrophils 01/27/2022 50  36 - 65 % Final    Lymphocytes 01/27/2022 39  24 - 43 % Final    Monocytes 01/27/2022 9  3 - 12 % Final    Eosinophils % 01/27/2022 1  1 - 4 % Final    Basophils 01/27/2022 1  0 - 2 % Final    Immature Granulocytes 01/27/2022 0  0 % Final    Segs Absolute 01/27/2022 4.52  1.50 - 8.10 k/uL Final    Absolute Lymph # 01/27/2022 3.47  1.10 - 3.70 k/uL Final    Absolute Mono # 01/27/2022 0.79  0.10 - 1.20 k/uL Final    Absolute Eos # 01/27/2022 0.09  0.00 - 0.44 k/uL Final    Basophils Absolute 01/27/2022 0.05  0.00 - 0.20 k/uL Final    Absolute Immature Granulocyte 01/27/2022 <0.03  0.00 - 0.30 k/uL Final    WBC Morphology 01/27/2022 NOT REPORTED   Final    RBC Morphology 01/27/2022 NOT REPORTED   Final    Platelet Estimate 03/29/9389 NOT REPORTED   Final    Glucose 01/27/2022 100* 70 - 99 mg/dL Final    BUN 01/27/2022 12  6 - 20 mg/dL Final    CREATININE 01/27/2022 0.86  0.70 - 1.20 mg/dL Final    Bun/Cre Ratio 01/27/2022 NOT REPORTED  9 - 20 Final    Calcium 01/27/2022 9.7  8.6 - 10.4 mg/dL Final    Sodium 01/27/2022 141  135 - 144 mmol/L Final    Potassium 01/27/2022 4.3  3.7 - 5.3 mmol/L Final    Chloride 01/27/2022 105  98 - 107 mmol/L Final    CO2 01/27/2022 22  20 - 31 mmol/L Final    Anion Gap 01/27/2022 14  9 - 17 mmol/L Final    Alkaline Phosphatase 01/27/2022 56  40 - 129 U/L Final    ALT 01/27/2022 16  5 - 41 U/L Final    AST 01/27/2022 15  <40 U/L Final    Total Bilirubin 01/27/2022 0.18* 0.3 - 1.2 mg/dL Final    Total Protein 01/27/2022 6.9  6.4 - 8.3 g/dL Final    Albumin 01/27/2022 4.5  3.5 - 5.2 g/dL Final    Albumin/Globulin Ratio 01/27/2022 1.9  1.0 - 2.5 Final    GFR Non- 01/27/2022 >60  >60 mL/min Final    GFR  01/27/2022 >60  >60 mL/min Final    GFR Comment 01/27/2022        Final    Comment: Average GFR for 20-28 years old:   116 mL/min/1.73sq m  Chronic Kidney Disease:   <60 mL/min/1.73sq m  Kidney failure:   <15 mL/min/1.73sq m              eGFR calculated using average adult body mass. Additional eGFR calculator available at:        [employer]            GFR Staging 01/27/2022 NOT REPORTED   Final    Ethanol 01/27/2022 <10  <10 mg/dL Final    Ethanol percent 01/27/2022 <0.010  <0.010 % Final    Amphetamine Screen, Ur 01/27/2022 NEGATIVE  NEGATIVE Final    Comment:       (Positive cutoff 1000 ng/mL)                  Barbiturate Screen, Ur 01/27/2022 NEGATIVE  NEGATIVE Final    Comment:       (Positive cutoff 200 ng/mL)                  Benzodiazepine Screen, Urine 01/27/2022 NEGATIVE  NEGATIVE Final    Comment:       (Positive cutoff 200 ng/mL)                  Cocaine Metabolite, Urine 01/27/2022 NEGATIVE  NEGATIVE Final    Comment:       (Positive cutoff 300 ng/mL)                  Methadone Screen, Urine 01/27/2022 NEGATIVE  NEGATIVE Final    Comment:       (Positive cutoff 300 ng/mL)                  Opiates, Urine 01/27/2022 NEGATIVE  NEGATIVE Final    Comment:       (Positive cutoff 300 ng/mL)                  Phencyclidine, Urine 01/27/2022 NEGATIVE  NEGATIVE Final    Comment:       (Positive cutoff 25 ng/mL)                  Propoxyphene, Urine 01/27/2022 NOT REPORTED  NEGATIVE Final    Cannabinoid Scrn, Ur 01/27/2022 NEGATIVE  NEGATIVE Final    Comment:       (Positive cutoff 50 ng/mL)                  Oxycodone Screen, Ur 01/27/2022 NEGATIVE  NEGATIVE Final    Comment:       (Positive cutoff 100 ng/mL)                  Methamphetamine, Urine 01/27/2022 NOT REPORTED  NEGATIVE Final    Tricyclic Antidepressants, Urine 01/27/2022 NOT REPORTED  NEGATIVE Final    MDMA, Urine 01/27/2022 NOT REPORTED  NEGATIVE Final    Buprenorphine Urine 01/27/2022 NOT REPORTED  NEGATIVE Final    Test Information 01/27/2022 Assay provides medical screening only. The absence of expected drug(s) and/or metabolite(s) may indicate diluted or adulterated urine, limitations of testing or timing of collection.    Final    Comment: Testing for legal purposes should be confirmed by another method. To request confirmation   of test result, please call the lab within 7 days of sample submission.  Specimen Description 01/27/2022 . NASOPHARYNGEAL SWAB   Final    SARS-CoV-2, Rapid 01/27/2022 Not Detected  Not Detected Final    Comment:       Rapid NAAT:  The specimen is NEGATIVE for SARS-CoV-2, the novel coronavirus associated with   COVID-19. The ID NOW COVID-19 assay is designed to detect the virus that causes COVID-19 in patients   with signs and symptoms of infection who are suspected of COVID-19. An individual without symptoms of COVID-19 and who is not shedding SARS-CoV-2 virus would   expect to have a negative (not detected) result in this assay. Negative results should be treated as presumptive and, if inconsistent with clinical signs   and symptoms or necessary for patient management,  should be tested with an alternative molecular assay. Negative results do not preclude   SARS-CoV-2 infection and   should not be used as the sole basis for patient management decisions. Fact sheet for Healthcare Providers: Angel  Fact sheet for Patients: Angel          Methodology: Isothermal Nucleic Acid Amplification           Reviewed patient's current plan of care and vital signs with nursing staff.     Labs reviewed: [x] Yes    Medications  Current Facility-Administered Medications: acetaminophen (TYLENOL) tablet 650 mg, 650 mg, Oral, Q4H PRN  aluminum & magnesium hydroxide-simethicone (MAALOX) 200-200-20 MG/5ML suspension 30 mL, 30 mL, Oral, Q6H PRN  hydrOXYzine (ATARAX) tablet 50 mg, 50 mg, Oral, TID PRN  ibuprofen (ADVIL;MOTRIN) tablet 400 mg, 400 mg, Oral, Q6H PRN  polyethylene glycol (GLYCOLAX) packet 17 g, 17 g, Oral, Daily PRN  traZODone (DESYREL) tablet 50 mg, 50 mg, Oral, Nightly PRN  nicotine polacrilex (NICORETTE) gum 2 mg, 2 mg, Oral, Q1H PRN  FLUoxetine (PROZAC) capsule 20 mg, 20 mg, Oral, Daily  albuterol sulfate  (90 Base) MCG/ACT inhaler 2 puff, 2 puff, Inhalation, 4x Daily PRN  OLANZapine (ZYPREXA) tablet 10 mg, 10 mg, Oral, Nightly  lithium (LITHOBID) extended release tablet 300 mg, 300 mg, Oral, 2 times per day    ASSESSMENT  Schizoaffective disorder, bipolar type [facility])         PLAN  Patient symptoms are: Modestly improving  Continue current medication regimen. Monitor need and frequency of PRN medications. Encourage participation in groups and milieu. Attempt to develop insight. Psycho-education conducted. Supportive Therapy conducted. Probable discharge is per attending physician. Follow-up daily while inpatient. Patient continues to be monitored in the inpatient psychiatric facility at [facility] for safety and stabilization. Patient continues to need, on a daily basis, active treatment furnished directly by or requiring the supervision of inpatient psychiatric personnel. Electronically signed by JOSE EDUARDO Jackson CNP on 1/29/2022 at 3:38 PM    **This report has been created using voice recognition software. It may contain minor errors which are inherent in voice recognition technology. **    I independently saw and evaluated the patient. I reviewed the nurse practitioners documentation above. Any additional comments or changes to the nurse practitioners documentation are stated below otherwise agree with assessment. Plan will be as follows:  Patient denying side effects to medication. Reporting some improvement in his symptoms. Discussed likely lithium level on Monday morning. Patient hopeful with current rate of improvement that he may be able to go home on Monday. PLAN  Patient s symptoms   are improving  Continue with current medications for now  Lithium level Monday along with BMP  Attempt to develop insight  Psycho-education conducted. Supportive Therapy conducted.   Probable discharge is possibly by Monday  Follow-up daily while on inpatient unit

## 2022-01-30 PROCEDURE — 6370000000 HC RX 637 (ALT 250 FOR IP): Performed by: PSYCHIATRY & NEUROLOGY

## 2022-01-30 PROCEDURE — 1240000000 HC EMOTIONAL WELLNESS R&B

## 2022-01-30 PROCEDURE — APPSS30 APP SPLIT SHARED TIME 16-30 MINUTES

## 2022-01-30 PROCEDURE — 90833 PSYTX W PT W E/M 30 MIN: CPT | Performed by: PSYCHIATRY & NEUROLOGY

## 2022-01-30 PROCEDURE — 99232 SBSQ HOSP IP/OBS MODERATE 35: CPT | Performed by: PSYCHIATRY & NEUROLOGY

## 2022-01-30 RX ADMIN — LITHIUM CARBONATE 300 MG: 300 TABLET, FILM COATED, EXTENDED RELEASE ORAL at 08:23

## 2022-01-30 RX ADMIN — NICOTINE POLACRILEX 2 MG: 2 GUM, CHEWING BUCCAL at 15:17

## 2022-01-30 RX ADMIN — NICOTINE POLACRILEX 2 MG: 2 GUM, CHEWING BUCCAL at 12:48

## 2022-01-30 RX ADMIN — NICOTINE POLACRILEX 2 MG: 2 GUM, CHEWING BUCCAL at 18:23

## 2022-01-30 RX ADMIN — NICOTINE POLACRILEX 2 MG: 2 GUM, CHEWING BUCCAL at 16:41

## 2022-01-30 RX ADMIN — TRAZODONE HYDROCHLORIDE 50 MG: 50 TABLET ORAL at 20:42

## 2022-01-30 RX ADMIN — OLANZAPINE 10 MG: 10 TABLET, FILM COATED ORAL at 20:42

## 2022-01-30 RX ADMIN — NICOTINE POLACRILEX 2 MG: 2 GUM, CHEWING BUCCAL at 08:23

## 2022-01-30 RX ADMIN — LITHIUM CARBONATE 300 MG: 300 TABLET, FILM COATED, EXTENDED RELEASE ORAL at 20:42

## 2022-01-30 RX ADMIN — FLUOXETINE HYDROCHLORIDE 20 MG: 20 CAPSULE ORAL at 08:23

## 2022-01-30 RX ADMIN — HYDROXYZINE HYDROCHLORIDE 50 MG: 50 TABLET, FILM COATED ORAL at 20:42

## 2022-01-30 ASSESSMENT — PAIN SCALES - GENERAL: PAINLEVEL_OUTOF10: 0

## 2022-01-30 NOTE — PLAN OF CARE
Problem: Altered Mood, Depressive Behavior:  Goal: Ability to disclose and discuss suicidal ideas will improve  Description: Ability to disclose and discuss suicidal ideas will improve  1/29/2022 2253 by Brianna Schuler RN  Outcome: Ongoing     Problem: Altered Mood, Depressive Behavior:  Goal: Absence of self-harm  Description: Absence of self-harm  1/29/2022 2253 by Brianna Schuler RN  Outcome: Ongoing     Problem: Tobacco Use:  Goal: Inpatient tobacco use cessation counseling participation  Description: Inpatient tobacco use cessation counseling participation  1/29/2022 2253 by Brianna Schuler RN  Outcome: Ongoing     Problem: Suicide risk  Goal: Provide patient with safe environment  Description: Provide patient with safe environment  1/29/2022 2253 by Brianna Schuler RN  Outcome: Ongoing

## 2022-01-30 NOTE — PLAN OF CARE
Problem: Altered Mood, Depressive Behavior:  Goal: Ability to disclose and discuss suicidal ideas will improve  Description: Ability to disclose and discuss suicidal ideas will improve  Outcome: Ongoing  Patient is alert, observed in room. Patient is pleasant on approach. Patient is currently denying thoughts of wanting to harm self or others. Patient reports not having any tactile, gustatory, auditory, olfactory, or visual hallucinations. Patient reports having little anxiety/depression, has improved since admission. Patient has been visible on unit, social with peers, attending unit programming. Sleep and appetite adequate. Patient is medication compliant, denies having any side effects. Hygiene is appropriate. Patient is encouraged to attend unit programming and socialize with peers. No further concerns voiced. Will continue to monitor.

## 2022-01-30 NOTE — PROGRESS NOTES
Daily Progress Note  1/30/2022    Patient Name: Akbar Koch    CHIEF COMPLAINT: Suicidal ideation         SUBJECTIVE:      Patient is seen today for a follow up assessment. Patient has been compliant with scheduled medications at this time and has not required emergency medications in the past 24 hours. When approached for interview patient presents as less hyperactive today. Patient reports reports improvement in suicidal ideations at this time is able to contract for safety on the unit. When discussing safety in the community patient reported that he has no idea what he would do if suicidal ideations present again. Coping skills and reaching out for help was communicated with the patient as very important. Patient was also encouraged to continue attending groups on the unit to develop coping skills. Patient denies auditory hallucinations at this time however endorses visual hallucinations of shadows last night. Patient states that he approached nursing staff and requested DBT therapy worksheets. Patient states he has not had a chance to review them at this time however intends to read them and follow-up with staff with what he learns.       Appetite:  [x] Adequate/Unchanged  [] Increased  [] Decreased      Sleep:       [x] Adequate/Unchanged  [] Fair  [] Poor      Group Attendance on Unit:   [x] Yes  [] Selectively    [] No    Medication Side Effects: Denies         Mental Status Exam  Level of consciousness: Alert and awake   Appearance: Appropriate attire for setting, seated in chair, with good  grooming and hygiene   Behavior/Motor: Approachable, no psychomotor abnormalities, less hyperactive today  Attitude toward examiner: Cooperative, attentive, good eye contact  Speech:  normal volume, rate and well articulated   Mood: \"good\"  Affect: Mood congruent  Thought processes: linear, goal-directed and coherent   Thought content:  Denies homicidal ideation  Suicidal Ideation: Reports improvement in suicidal ideations, contracts for safety on the unit. Delusions: No evidence of delusions. Perceptual Disturbance:  Patient does not appear to be responding to internal stimuli. Cognition: Oriented to self, location, time, and situation  Memory: intact  Insight: fair   Judgement: fair       Data   height is 5' 7\" (1.702 m) and weight is 210 lb (95.3 kg). Her oral temperature is 98.2 °F (36.8 °C). Her blood pressure is 137/80 and her pulse is 100. Her respiration is 14 and oxygen saturation is 100%. Labs:   No visits with results within 2 Day(s) from this visit.    Latest known visit with results is:   Admission on 01/27/2022, Discharged on 01/28/2022   Component Date Value Ref Range Status    WBC 01/27/2022 8.9  3.5 - 11.3 k/uL Final    RBC 01/27/2022 5.05  4.21 - 5.77 m/uL Final    Hemoglobin 01/27/2022 14.4  13.0 - 17.0 g/dL Final    Hematocrit 01/27/2022 42.0  40.7 - 50.3 % Final    MCV 01/27/2022 83.2  82.6 - 102.9 fL Final    MCH 01/27/2022 28.5  25.2 - 33.5 pg Final    MCHC 01/27/2022 34.3  28.4 - 34.8 g/dL Final    RDW 01/27/2022 12.9  11.8 - 14.4 % Final    Platelets 01/08/3971 250  138 - 453 k/uL Final    MPV 01/27/2022 11.8  8.1 - 13.5 fL Final    NRBC Automated 01/27/2022 0.0  0.0 per 100 WBC Final    Differential Type 01/27/2022 NOT REPORTED   Final    Seg Neutrophils 01/27/2022 50  36 - 65 % Final    Lymphocytes 01/27/2022 39  24 - 43 % Final    Monocytes 01/27/2022 9  3 - 12 % Final    Eosinophils % 01/27/2022 1  1 - 4 % Final    Basophils 01/27/2022 1  0 - 2 % Final    Immature Granulocytes 01/27/2022 0  0 % Final    Segs Absolute 01/27/2022 4.52  1.50 - 8.10 k/uL Final    Absolute Lymph # 01/27/2022 3.47  1.10 - 3.70 k/uL Final    Absolute Mono # 01/27/2022 0.79  0.10 - 1.20 k/uL Final    Absolute Eos # 01/27/2022 0.09  0.00 - 0.44 k/uL Final    Basophils Absolute 01/27/2022 0.05  0.00 - 0.20 k/uL Final    Absolute Immature Granulocyte 01/27/2022 <0.03  0.00 - 0.30 k/uL Final    WBC Morphology 01/27/2022 NOT REPORTED   Final    RBC Morphology 01/27/2022 NOT REPORTED   Final    Platelet Estimate 07/68/2776 NOT REPORTED   Final    Glucose 01/27/2022 100* 70 - 99 mg/dL Final    BUN 01/27/2022 12  6 - 20 mg/dL Final    CREATININE 01/27/2022 0.86  0.70 - 1.20 mg/dL Final    Bun/Cre Ratio 01/27/2022 NOT REPORTED  9 - 20 Final    Calcium 01/27/2022 9.7  8.6 - 10.4 mg/dL Final    Sodium 01/27/2022 141  135 - 144 mmol/L Final    Potassium 01/27/2022 4.3  3.7 - 5.3 mmol/L Final    Chloride 01/27/2022 105  98 - 107 mmol/L Final    CO2 01/27/2022 22  20 - 31 mmol/L Final    Anion Gap 01/27/2022 14  9 - 17 mmol/L Final    Alkaline Phosphatase 01/27/2022 56  40 - 129 U/L Final    ALT 01/27/2022 16  5 - 41 U/L Final    AST 01/27/2022 15  <40 U/L Final    Total Bilirubin 01/27/2022 0.18* 0.3 - 1.2 mg/dL Final    Total Protein 01/27/2022 6.9  6.4 - 8.3 g/dL Final    Albumin 01/27/2022 4.5  3.5 - 5.2 g/dL Final    Albumin/Globulin Ratio 01/27/2022 1.9  1.0 - 2.5 Final    GFR Non- 01/27/2022 >60  >60 mL/min Final    GFR  01/27/2022 >60  >60 mL/min Final    GFR Comment 01/27/2022        Final    Comment: Average GFR for 20-28 years old:   116 mL/min/1.73sq m  Chronic Kidney Disease:   <60 mL/min/1.73sq m  Kidney failure:   <15 mL/min/1.73sq m              eGFR calculated using average adult body mass.  Additional eGFR calculator available at:        [email or web address]            GFR Staging 01/27/2022 NOT REPORTED   Final    Ethanol 01/27/2022 <10  <10 mg/dL Final    Ethanol percent 01/27/2022 <0.010  <0.010 % Final    Amphetamine Screen, Ur 01/27/2022 NEGATIVE  NEGATIVE Final    Comment:       (Positive cutoff 1000 ng/mL)                  Barbiturate Screen, Ur 01/27/2022 NEGATIVE  NEGATIVE Final    Comment:       (Positive cutoff 200 ng/mL)                  Benzodiazepine Screen, Urine 01/27/2022 NEGATIVE NEGATIVE Final    Comment:       (Positive cutoff 200 ng/mL)                  Cocaine Metabolite, Urine 01/27/2022 NEGATIVE  NEGATIVE Final    Comment:       (Positive cutoff 300 ng/mL)                  Methadone Screen, Urine 01/27/2022 NEGATIVE  NEGATIVE Final    Comment:       (Positive cutoff 300 ng/mL)                  Opiates, Urine 01/27/2022 NEGATIVE  NEGATIVE Final    Comment:       (Positive cutoff 300 ng/mL)                  Phencyclidine, Urine 01/27/2022 NEGATIVE  NEGATIVE Final    Comment:       (Positive cutoff 25 ng/mL)                  Propoxyphene, Urine 01/27/2022 NOT REPORTED  NEGATIVE Final    Cannabinoid Scrn, Ur 01/27/2022 NEGATIVE  NEGATIVE Final    Comment:       (Positive cutoff 50 ng/mL)                  Oxycodone Screen, Ur 01/27/2022 NEGATIVE  NEGATIVE Final    Comment:       (Positive cutoff 100 ng/mL)                  Methamphetamine, Urine 01/27/2022 NOT REPORTED  NEGATIVE Final    Tricyclic Antidepressants, Urine 01/27/2022 NOT REPORTED  NEGATIVE Final    MDMA, Urine 01/27/2022 NOT REPORTED  NEGATIVE Final    Buprenorphine Urine 01/27/2022 NOT REPORTED  NEGATIVE Final    Test Information 01/27/2022 Assay provides medical screening only. The absence of expected drug(s) and/or metabolite(s) may indicate diluted or adulterated urine, limitations of testing or timing of collection. Final    Comment: Testing for legal purposes should be confirmed by another method. To request confirmation   of test result, please call the lab within 7 days of sample submission.  Specimen Description 01/27/2022 . NASOPHARYNGEAL SWAB   Final    SARS-CoV-2, Rapid 01/27/2022 Not Detected  Not Detected Final    Comment:       Rapid NAAT:  The specimen is NEGATIVE for SARS-CoV-2, the novel coronavirus associated with   COVID-19.         The ID NOW COVID-19 assay is designed to detect the virus that causes COVID-19 in patients   with signs and symptoms of infection who are suspected of COVID-19. An individual without symptoms of COVID-19 and who is not shedding SARS-CoV-2 virus would   expect to have a negative (not detected) result in this assay. Negative results should be treated as presumptive and, if inconsistent with clinical signs   and symptoms or necessary for patient management,  should be tested with an alternative molecular assay. Negative results do not preclude   SARS-CoV-2 infection and   should not be used as the sole basis for patient management decisions. Fact sheet for Healthcare Providers: Angel  Fact sheet for Patients: Angel          Methodology: Isothermal Nucleic Acid Amplification           Reviewed patient's current plan of care and vital signs with nursing staff. Labs reviewed: [x] Yes    Medications  Current Facility-Administered Medications: acetaminophen (TYLENOL) tablet 650 mg, 650 mg, Oral, Q4H PRN  aluminum & magnesium hydroxide-simethicone (MAALOX) 200-200-20 MG/5ML suspension 30 mL, 30 mL, Oral, Q6H PRN  hydrOXYzine (ATARAX) tablet 50 mg, 50 mg, Oral, TID PRN  ibuprofen (ADVIL;MOTRIN) tablet 400 mg, 400 mg, Oral, Q6H PRN  polyethylene glycol (GLYCOLAX) packet 17 g, 17 g, Oral, Daily PRN  traZODone (DESYREL) tablet 50 mg, 50 mg, Oral, Nightly PRN  nicotine polacrilex (NICORETTE) gum 2 mg, 2 mg, Oral, Q1H PRN  FLUoxetine (PROZAC) capsule 20 mg, 20 mg, Oral, Daily  albuterol sulfate  (90 Base) MCG/ACT inhaler 2 puff, 2 puff, Inhalation, 4x Daily PRN  OLANZapine (ZYPREXA) tablet 10 mg, 10 mg, Oral, Nightly  lithium (LITHOBID) extended release tablet 300 mg, 300 mg, Oral, 2 times per day    ASSESSMENT  Schizoaffective disorder, bipolar type ([facility] [address]         PLAN  Patient symptoms are: Modestly improving  Continue current medication regimen. Monitor need and frequency of PRN medications. Encourage participation in groups and milieu. Attempt to develop insight.   Psycho-education conducted. Supportive Therapy conducted. Probable discharge is per attending physician. Follow-up daily while inpatient. Patient continues to be monitored in the inpatient psychiatric facility at [facility] for safety and stabilization. Patient continues to need, on a daily basis, active treatment furnished directly by or requiring the supervision of inpatient psychiatric personnel. Electronically signed by JOSE EDUARDO Adam CNP on 1/30/2022 at 3:18 PM    **This report has been created using voice recognition software. It may contain minor errors which are inherent in voice recognition technology. **    I independently saw and evaluated the patient. I reviewed the nurse practitioners documentation above. Any additional comments or changes to the nurse practitioners documentation are stated below otherwise agree with assessment. Plan will be as follows:  Spent 30 minutes with the patient, of that greater than 16 minutes was spent in supportive psychotherapy. Patient denying side effects to medication. Reports improvement in mood. Discussed to have stable through tomorrow would consider discharge and patient is in agreement  PLAN  Patient s symptoms   are improving  Continue with current medications for now  Attempt to develop insight  Psycho-education conducted. Supportive Therapy conducted.   Probable discharge is tomorrow  Follow-up daily while on inpatient unit

## 2022-01-31 VITALS
OXYGEN SATURATION: 100 % | TEMPERATURE: 98.7 F | WEIGHT: 210 LBS | HEART RATE: 89 BPM | HEIGHT: 67 IN | BODY MASS INDEX: 32.96 KG/M2 | RESPIRATION RATE: 14 BRPM | DIASTOLIC BLOOD PRESSURE: 85 MMHG | SYSTOLIC BLOOD PRESSURE: 148 MMHG

## 2022-01-31 LAB
ANION GAP SERPL CALCULATED.3IONS-SCNC: 13 MMOL/L (ref 9–17)
BUN BLDV-MCNC: 12 MG/DL (ref 6–20)
BUN/CREAT BLD: ABNORMAL (ref 9–20)
CALCIUM SERPL-MCNC: 9.2 MG/DL (ref 8.6–10.4)
CHLORIDE BLD-SCNC: 101 MMOL/L (ref 98–107)
CO2: 22 MMOL/L (ref 20–31)
CREAT SERPL-MCNC: 0.89 MG/DL (ref 0.7–1.2)
GFR AFRICAN AMERICAN: >60 ML/MIN
GFR NON-AFRICAN AMERICAN: >60 ML/MIN
GFR SERPL CREATININE-BSD FRML MDRD: ABNORMAL ML/MIN/{1.73_M2}
GFR SERPL CREATININE-BSD FRML MDRD: ABNORMAL ML/MIN/{1.73_M2}
GLUCOSE BLD-MCNC: 127 MG/DL (ref 70–99)
LITHIUM DATE LAST DOSE: ABNORMAL
LITHIUM DOSE AMOUNT: 300
LITHIUM DOSE TIME: 2042
LITHIUM LEVEL: 0.2 MMOL/L (ref 0.6–1.2)
POTASSIUM SERPL-SCNC: 4 MMOL/L (ref 3.7–5.3)
SODIUM BLD-SCNC: 136 MMOL/L (ref 135–144)

## 2022-01-31 PROCEDURE — 80178 ASSAY OF LITHIUM: CPT

## 2022-01-31 PROCEDURE — 6370000000 HC RX 637 (ALT 250 FOR IP): Performed by: PSYCHIATRY & NEUROLOGY

## 2022-01-31 PROCEDURE — 36415 COLL VENOUS BLD VENIPUNCTURE: CPT

## 2022-01-31 PROCEDURE — 99239 HOSP IP/OBS DSCHRG MGMT >30: CPT | Performed by: PSYCHIATRY & NEUROLOGY

## 2022-01-31 PROCEDURE — 80048 BASIC METABOLIC PNL TOTAL CA: CPT

## 2022-01-31 RX ORDER — TRAZODONE HYDROCHLORIDE 50 MG/1
50 TABLET ORAL NIGHTLY PRN
Qty: 30 TABLET | Refills: 0 | Status: SHIPPED | OUTPATIENT
Start: 2022-01-31

## 2022-01-31 RX ORDER — LITHIUM CARBONATE 300 MG/1
300 TABLET, FILM COATED, EXTENDED RELEASE ORAL EVERY 12 HOURS SCHEDULED
Qty: 60 TABLET | Refills: 3 | Status: SHIPPED | OUTPATIENT
Start: 2022-01-31

## 2022-01-31 RX ADMIN — LITHIUM CARBONATE 300 MG: 300 TABLET, FILM COATED, EXTENDED RELEASE ORAL at 10:45

## 2022-01-31 RX ADMIN — FLUOXETINE HYDROCHLORIDE 20 MG: 20 CAPSULE ORAL at 08:20

## 2022-01-31 RX ADMIN — NICOTINE POLACRILEX 2 MG: 2 GUM, CHEWING BUCCAL at 10:00

## 2022-01-31 NOTE — BH NOTE
.Patient given tobacco quitline number 24605933796 at this time, refusing to call at this time, states \" I just dont want to quit now\"- patient given information as to the dangers of long term tobacco use. Continue to reinforce the importance of tobacco cessation.

## 2022-01-31 NOTE — DISCHARGE SUMMARY
Provider Discharge Summary     Patient ID:  Lidia Mosquera  [id number]  77 y.o.  2000    Admit date: 1/28/2022    Discharge date and time: 1/31/2022  1:14 PM     Admitting Physician: Shalini Garcia MD     Discharge Physician: Sagar Joel MD    Admission Diagnoses: Depression with suicidal ideation [F32. A, R45.851]    Discharge Diagnoses:      Schizoaffective disorder, bipolar type [facility])     Patient Active Problem List   Diagnosis Code    Streptococcal bacteremia: Viridans R78.81, B95.5    Hypokalemia E87.6    Pharyngitis due to Streptococcus species J02.0    Abnormal CT of the chest - possible septic emboli  R93.89    Hyponatremia E87.1    Opacity left lower lobe R93.89    Bacteremia R78.81    Multiple lung nodules on CT R91.8    Schizoaffective disorder (HCC) F25.9    Depressive disorder F32.9    Depression with suicidal ideation F32. A, R45.851    Schizoaffective disorder, bipolar type ([facility] [address] F25.0    Borderline personality disorder ([facility] [address] F60.3        Admission Condition: poor    Discharged Condition: stable    Indication for Admission: threat to self    History of Present Illnes (present tense wording is of findings from admission exam and are not necessarily indicative of current findings):   Lidia Mosquera is a 19 y.o. male who has a past medical history of bipolar and schizoaffective disorder. Patient presented to the ED   \"for a psychiatric evaluation. Patient states that he has a history of Bipolar Disorder. Patient reports that earlier today he was having suicidal thoughts. When this SW asked the patient if he is currently feeling suicidal the patient states, \"I guess. \" Patient reports that he also cut his arms and legs today. Patient states that he was not trying to kill himself when he was cutting. Patient reports that he has a history of self cutting. Patient states that currently he just feels very depressed and wants a medication adjustment.  On top of the Depression, the patient states that he does have manic episodes and with symptoms of impulsive cutting and spending money. Patient denied any current/history of psychosis or anxiety. Patient reports that he feels that his psychiatric medications are not effective and states he is compliant with his Prozac and Lamictal. Patient reports that he is connected with [facility]. The patient denied ever attempting suicide in the past. The patient denied any current drug or alcohol use. Patient states that he wants to be admitted inpatient for a medication adjustment. Plan to contact [facility] after patient is medically cleared. \"               Patient reports currently following with Psychiatric hospital outpatient however has not been following up the past few weeks. Patient endorses current medication includes Prozac, Zyprexa and Lamictal.  Patient reports that all three were helpful in the past however states that they have been less helpful as of recently. Patient reports that she most recently had the addition of Lamictal.  After discussing with physician, will restart Prozac and Zyprexa in addition to lithium and discontinue Lamictal.     When discussing reasons for admission patient reports that as of recently she began feeling like a zombie which she attributes to an increase in medications and states that she harmed herself to \"feel something\". Patient reports compliance with medication in the community reports desire to maintain compliant while working with doctors staff and patient to adjust medications. Patient presents with extensive cuts on the forearms bilaterally and legs. Patient reports that she was feeling suicidal last night however did not have a plan at that time to kill herself. Patient endorses cutting her arms and legs prior to admission. Patient endorses depression as a 6 out of 10 on a 1-10 scale (one being low and 10 being high).   Patient denies current plan how to harm herself and is able to contract for safety on the unit.  Patient endorses a history of generalized anxiety disorder however denies anxiety at this time. Patient endorses a history of panic attacks stating last time she had one was in 2020. Patient also endorses significant history of bay reporting she has gone to 3 days without needing sleep with increased energy and impulsive behavior. Patient reports last time this happened was 2 to 3 days ago. Patient also endorses a history of visual hallucinations reported she sees shadow figures walking around. Reports last time this happened was a few days ago. Patient denies auditory hallucinations. Patient endorses delusions of reference stating that she feels people are watching her and talking about her and recognizes as irrational at times however is unable to prevent the thoughts. Patient also endorses significant symptoms of borderline personality disorder. At this time, the patient is not appropriate for a lower level of care. There is risk of decompensation and patient warrants further hospitalization for safety and stabilization.     When discussing alcohol consumption patient denies all use. Patient does endorse smoking marijuana occasionally however not often. UDS was negative upon admission.         Hospital Course:   Upon admission, Aaron Thomas was provided a safe secure environment, introduced to unit milieu. Patient participated in groups and individual therapies. Meds were adjusted as noted below. After few days of hospital care, patient began to feel improvement. Depression lifted, thoughts to harm self ceased. Sleep improved, appetite was good. On morning rounds 1/31/2022, Aaron Thomas  endorses feeling ready for discharge. Patient denies suicidal or homicidal ideations, denies hallucinations or delusions. Denies SE's from meds. It was decided that maximum benefit from hospital care had been achieved and patient can be discharged.      Consults:   Internal medicine for medical management    Significant Diagnostic Studies: Routine labs and diagnostics    Treatments: Psychotropic medications, therapy with group, milieu, and 1:1 with nurses, social workers, PALEO/CNP, and Attending physician.       Discharge Medications:  Discharge Medication List as of 1/31/2022 11:19 AM      START taking these medications    Details   lithium (LITHOBID) 300 MG extended release tablet Take 1 tablet by mouth every 12 hours, Disp-60 tablet, R-3Normal      traZODone (DESYREL) 50 MG tablet Take 1 tablet by mouth nightly as needed for Sleep, Disp-30 tablet, R-0Normal         CONTINUE these medications which have NOT CHANGED    Details   OLANZapine (ZYPREXA) 10 MG tablet take 1 tablet by mouth once dailyHistorical Med      albuterol sulfate HFA (VENTOLIN HFA) 108 (90 Base) MCG/ACT inhaler Inhale 2 puffs into the lungs 4 times daily as needed for Wheezing, Disp-1 Inhaler,R-0Normal      FLUoxetine (PROZAC) 20 MG capsule Take 20 mg by mouth dailyHistorical Med      hydrOXYzine (VISTARIL) 50 MG capsule Take 50 mg by mouth every 6 hours as needed for ItchingHistorical Med      ibuprofen (ADVIL;MOTRIN) 400 MG tablet Take 400 mg by mouth every 6 hours as needed for PainHistorical Med         STOP taking these medications       itraconazole (SPORANOX) 100 MG capsule Comments:   Reason for Stopping:                Core Measures statement:   Not applicable    Discharge Exam:  Level of consciousness:  Within normal limits  Appearance: Street clothes, seated, with good grooming  Behavior/Motor: No abnormalities noted  Attitude toward examiner:  Cooperative, attentive, good eye contact  Speech:  spontaneous, normal rate, normal volume and well articulated  Mood:  euthymic  Affect:  Full range  Thought processes:  linear, goal directed and coherent  Thought content:  denies homicidal ideation  Suicidal Ideation:  denies suicidal ideation  Delusions:  no evidence of delusions  Perceptual Disturbance:  denies any perceptual disturbance  Cognition:  Intact  Memory: age appropriate  Insight & Judgement: fair  Medication side effects: denies     Disposition: home    Patient Instructions: Activity: activity as tolerated  1. Patient instructed to take medications regularly and follow up with outpatient appointments. Follow-up as scheduled with outpatient [facility]      Signed:    Electronically signed by Justus Yap MD on 1/31/22 at 1:14 PM EST    Time Spent on discharge is more than 30 minutes in the examination, evaluation, counseling and review of medications and discharge plan.

## 2022-01-31 NOTE — BH NOTE
Emergency PRN Medication Administration Note:      Patient is Agitated as evidence by pt reporting anxiety, pt tapping foot repeatedly while sitting and pt complaining of difficulty sleeping. Staff attempted to find and relieve the distress by Talking to patient, Refocusing on new activity and Administer PRN medications Patient is currently  In behavioral control and accepting of oral trazodone for sleep and atarax for anxiety. Medication Administered as prescribed: trazodone 50 mg by mouth for sleep and atarax 50mg by mouth for anxiety. Patient Tolerated medication administration. Will continue to monitor, offer support, and reassess.

## 2022-01-31 NOTE — CARE COORDINATION
Name: Agustin Villanueva    : 2000    Discharge Date:   Primary Auth/Cert #: [id number]  Destination: home     Discharge Medications:      Medication List      START taking these medications    lithium 300 MG extended release tablet  Commonly known as: LITHOBID  Take 1 tablet by mouth every 12 hours  Notes to patient: Mood stabilizer     traZODone 50 MG tablet  Commonly known as: DESYREL  Take 1 tablet by mouth nightly as needed for Sleep  Notes to patient: Sleep aid. CONTINUE taking these medications    albuterol sulfate  (90 Base) MCG/ACT inhaler  Commonly known as: Ventolin HFA  Inhale 2 puffs into the lungs 4 times daily as needed for Wheezing  Notes to patient: wheezing     FLUoxetine 20 MG capsule  Commonly known as: PROZAC  Notes to patient: Mood stabilizer. hydrOXYzine 50 MG capsule  Commonly known as: VISTARIL  Notes to patient: Anxiety/itching     ibuprofen 400 MG tablet  Commonly known as: ADVIL;MOTRIN  Notes to patient: pain     OLANZapine 10 MG tablet  Commonly known as: ZYPREXA  Notes to patient: Mood stabilizer        STOP taking these medications    itraconazole 100 MG capsule  Commonly known as: SPORANOX           Where to Get Your Medications      These medications were sent to [place] [id number], [facility] [address] [address]    Phone: [phone]   · lithium 300 MG extended release tablet  · traZODone 50 MG tablet     Pharmacy Instructions:    [facility]'s to bed           Follow Up Appointment: [facility]  [address] [facility]  [phone]  On 2022  @ 10:45am for case management     [facility]  [address] [facility]  [phone]  On 2022  @ 2pm with Brooks Button for therapy     [facility]  [address]   [address] [facility] [phone]  On 2022  @ 1pm for medication management

## 2022-01-31 NOTE — PROGRESS NOTES
CLINICAL PHARMACY NOTE: MEDS TO BEDS    Total # of Prescriptions Filled: 2   The following medications were delivered to the patient:  · Lithium Carbonate ER 300mg  · Trazodone HCL 50mg    Additional Documentation:  Delivered Medication to [address] [place] [address]

## 2022-01-31 NOTE — BH NOTE
[facility]  Discharge Note    Pt discharged with followings belongings:   Dental Appliances: None  Vision - Corrective Lenses: None  Hearing Aid: None  Jewelry: None  Body Piercings Removed: N/A  Clothing: [place] / coat,Pants,Shirt,Socks,Undergarments (Comment)  Were All Patient Medications Collected?: Not Applicable  Other Valuables: Cell phone,Other (Comment) (vaporizer pen)   Valuables  returned to patient. Patient education on aftercare instructions: yes. Information faxed to [facility] by staff. at 1:00 PM .Patient verbalize understanding of AVS:  Patient denied suicidal and homicidal ideations. Patient discharged to home with his mom VIA car. Patient given all personal belonging at discharge.    Status EXAM upon discharge:  Status and Exam  Normal: Yes  Facial Expression: Brightened  Affect: Appropriate  Level of Consciousness: Alert  Mood:Normal: Yes  Mood: Depressed,Anxious (mild depression and anxiety reported)  Motor Activity:Normal: Yes  Interview Behavior: Cooperative  Preception: [place] to Person,[place] to Time,[place] to Place,[place] to Situation  Attention:Normal: Yes  Attention: Distractible  Thought Processes: Circumstantial  Thought Content:Normal: Yes  Thought Content: Preoccupations  Hallucinations: None  Delusions: No  Memory:Normal: Yes  Insight and Judgment: Yes  Insight and Judgment: Poor Insight  Present Suicidal Ideation: No  Present Homicidal Ideation: No      Metabolic Screening:    No results found for: LABA1C    No results found for: CHOL  No results found for: TRIG  No results found for: HDL  No components found for: LDLCAL  No results found for: Maik Brantley LPN

## 2022-01-31 NOTE — DISCHARGE INSTR - DIET

## 2022-01-31 NOTE — PLAN OF CARE
Problem: Altered Mood, Depressive Behavior:  Goal: Ability to disclose and discuss suicidal ideas will improve  Description: Ability to disclose and discuss suicidal ideas will improve  1/30/2022 1934 by Russ Mclaughlin RN  Outcome: Ongoing  Note: Patient denies any thoughts to suicidal ideations and no signs of self harm were noted. Patient encouraged to inform staff if she starts to develop any thoughts to harm self. Patient voiced understanding. Problem: Altered Mood, Depressive Behavior:  Goal: Absence of self-harm  Description: Absence of self-harm  Outcome: Ongoing  Note: Patient denies any thoughts to harm self and no signs of self harm were noted. Patient encouraged to inform staff if she starts to develop any thoughts to harm self. Patient voiced understanding. Problem: Tobacco Use:  Goal: Inpatient tobacco use cessation counseling participation  Description: Inpatient tobacco use cessation counseling participation  Outcome: Ongoing  Note: Pt reports no desire to quit smoking at this time. Problem: Suicide risk  Goal: Provide patient with safe environment  Description: Provide patient with safe environment  Outcome: Ongoing  Note: Patient is currently on a locked psychiatric unit where every 15 minute checks are performed on patients for safety.

## 2023-03-24 ENCOUNTER — HOSPITAL ENCOUNTER (EMERGENCY)
Age: 23
Discharge: HOME | End: 2023-03-25
Attending: STUDENT IN AN ORGANIZED HEALTH CARE EDUCATION/TRAINING PROGRAM
Payer: COMMERCIAL

## 2023-03-24 VITALS
BODY MASS INDEX: 39.24 KG/M2 | OXYGEN SATURATION: 97 % | WEIGHT: 250 LBS | DIASTOLIC BLOOD PRESSURE: 90 MMHG | HEART RATE: 66 BPM | TEMPERATURE: 98.3 F | RESPIRATION RATE: 16 BRPM | HEIGHT: 67 IN | SYSTOLIC BLOOD PRESSURE: 127 MMHG

## 2023-03-24 DIAGNOSIS — R11.2 NAUSEA AND VOMITING, UNSPECIFIED VOMITING TYPE: Primary | ICD-10-CM

## 2023-03-24 LAB
ABSOLUTE EOS #: 0.1 K/UL (ref 0–0.4)
ABSOLUTE LYMPH #: 3.9 K/UL (ref 1–4.8)
ABSOLUTE MONO #: 0.7 K/UL (ref 0.1–1.3)
ALBUMIN SERPL-MCNC: 4.6 G/DL (ref 3.5–5.2)
ALP SERPL-CCNC: 74 U/L (ref 40–129)
ALT SERPL-CCNC: 55 U/L (ref 5–41)
ANION GAP SERPL CALCULATED.3IONS-SCNC: 13 MMOL/L (ref 9–17)
AST SERPL-CCNC: 55 U/L
BASOPHILS # BLD: 1 % (ref 0–2)
BASOPHILS ABSOLUTE: 0.1 K/UL (ref 0–0.2)
BILIRUB SERPL-MCNC: 0.4 MG/DL (ref 0.3–1.2)
BUN SERPL-MCNC: 12 MG/DL (ref 6–20)
CALCIUM SERPL-MCNC: 9.5 MG/DL (ref 8.6–10.4)
CHLORIDE SERPL-SCNC: 103 MMOL/L (ref 98–107)
CO2 SERPL-SCNC: 23 MMOL/L (ref 20–31)
CREAT SERPL-MCNC: 1.03 MG/DL (ref 0.7–1.2)
EOSINOPHILS RELATIVE PERCENT: 1 % (ref 0–4)
GFR SERPL CREATININE-BSD FRML MDRD: >60 ML/MIN/1.73M2
GLUCOSE SERPL-MCNC: 107 MG/DL (ref 70–99)
HCT VFR BLD AUTO: 45.8 % (ref 41–53)
HGB BLD-MCNC: 15.5 G/DL (ref 13.5–17.5)
LYMPHOCYTES # BLD: 38 % (ref 24–44)
MAGNESIUM SERPL-MCNC: 2.2 MG/DL (ref 1.6–2.6)
MCH RBC QN AUTO: 28.2 PG (ref 26–34)
MCHC RBC AUTO-ENTMCNC: 33.8 G/DL (ref 31–37)
MCV RBC AUTO: 83.5 FL (ref 80–100)
MONOCYTES # BLD: 7 % (ref 1–7)
PDW BLD-RTO: 13.6 % (ref 11.5–14.9)
PLATELET # BLD AUTO: 276 K/UL (ref 150–450)
PMV BLD AUTO: 8.6 FL (ref 6–12)
POTASSIUM SERPL-SCNC: 3.6 MMOL/L (ref 3.7–5.3)
PROT SERPL-MCNC: 7.5 G/DL (ref 6.4–8.3)
RBC # BLD: 5.48 M/UL (ref 4.5–5.9)
SEG NEUTROPHILS: 53 % (ref 36–66)
SEGMENTED NEUTROPHILS ABSOLUTE COUNT: 5.7 K/UL (ref 1.3–9.1)
SODIUM SERPL-SCNC: 139 MMOL/L (ref 135–144)
WBC # BLD AUTO: 10.4 K/UL (ref 3.5–11)

## 2023-03-24 PROCEDURE — 85025 COMPLETE CBC W/AUTO DIFF WBC: CPT

## 2023-03-24 PROCEDURE — 2580000003 HC RX 258: Performed by: STUDENT IN AN ORGANIZED HEALTH CARE EDUCATION/TRAINING PROGRAM

## 2023-03-24 PROCEDURE — 36415 COLL VENOUS BLD VENIPUNCTURE: CPT

## 2023-03-24 PROCEDURE — 6370000000 HC RX 637 (ALT 250 FOR IP): Performed by: STUDENT IN AN ORGANIZED HEALTH CARE EDUCATION/TRAINING PROGRAM

## 2023-03-24 PROCEDURE — 96374 THER/PROPH/DIAG INJ IV PUSH: CPT

## 2023-03-24 PROCEDURE — 83735 ASSAY OF MAGNESIUM: CPT

## 2023-03-24 PROCEDURE — 99284 EMERGENCY DEPT VISIT MOD MDM: CPT

## 2023-03-24 PROCEDURE — 6360000002 HC RX W HCPCS: Performed by: STUDENT IN AN ORGANIZED HEALTH CARE EDUCATION/TRAINING PROGRAM

## 2023-03-24 PROCEDURE — 80053 COMPREHEN METABOLIC PANEL: CPT

## 2023-03-24 RX ORDER — ONDANSETRON 4 MG/1
4 TABLET, ORALLY DISINTEGRATING ORAL 3 TIMES DAILY PRN
Qty: 21 TABLET | Refills: 0 | Status: SHIPPED | OUTPATIENT
Start: 2023-03-24

## 2023-03-24 RX ORDER — 0.9 % SODIUM CHLORIDE 0.9 %
1000 INTRAVENOUS SOLUTION INTRAVENOUS ONCE
Status: COMPLETED | OUTPATIENT
Start: 2023-03-24 | End: 2023-03-25

## 2023-03-24 RX ORDER — ONDANSETRON 2 MG/ML
4 INJECTION INTRAMUSCULAR; INTRAVENOUS ONCE
Status: COMPLETED | OUTPATIENT
Start: 2023-03-24 | End: 2023-03-24

## 2023-03-24 RX ADMIN — POTASSIUM BICARBONATE 40 MEQ: 782 TABLET, EFFERVESCENT ORAL at 23:41

## 2023-03-24 RX ADMIN — SODIUM CHLORIDE 1000 ML: 9 INJECTION, SOLUTION INTRAVENOUS at 22:32

## 2023-03-24 RX ADMIN — ONDANSETRON 4 MG: 2 INJECTION INTRAMUSCULAR; INTRAVENOUS at 22:33

## 2023-03-24 ASSESSMENT — PAIN - FUNCTIONAL ASSESSMENT: PAIN_FUNCTIONAL_ASSESSMENT: NONE - DENIES PAIN

## 2023-03-24 ASSESSMENT — ENCOUNTER SYMPTOMS
EYE REDNESS: 0
RHINORRHEA: 0
SORE THROAT: 0
DIARRHEA: 0
VOMITING: 1
ABDOMINAL PAIN: 0
SHORTNESS OF BREATH: 0
NAUSEA: 1
EYE DISCHARGE: 0

## 2023-03-24 ASSESSMENT — LIFESTYLE VARIABLES
HOW OFTEN DO YOU HAVE A DRINK CONTAINING ALCOHOL: NEVER
HOW MANY STANDARD DRINKS CONTAINING ALCOHOL DO YOU HAVE ON A TYPICAL DAY: PATIENT DOES NOT DRINK

## 2023-03-25 NOTE — ED NOTES
Discharge instructions reviewed with patient, noting all directions and education by provider. Patient verbalizes understanding of all information reviewed, gathered personal items, and transferred under own power off unit to lobby without incident.      James Robertson RN  [id number]

## 2023-03-25 NOTE — ED PROVIDER NOTES
EMERGENCY DEPARTMENT ENCOUNTER    Pt Name: Jcarlos Thorne  MRN: [id number]  [place] 2000  Date of evaluation: 3/24/23  CHIEF COMPLAINT       Chief Complaint   Patient presents with    Emesis     HISTORY OF PRESENT ILLNESS   23year-old presents with vomiting  going on for 2 weeks. Nausea and vomiting. Typically daily    No abdominal pain. No fevers or chills. No dysuria. Having bowel movements. No other symptoms. No recent travel. No recent surgeries. REVIEW OF SYSTEMS     Review of Systems   Constitutional:  Negative for chills and fever. HENT:  Negative for rhinorrhea and sore throat. Eyes:  Negative for discharge and redness. Respiratory:  Negative for shortness of breath. Cardiovascular:  Negative for chest pain. Gastrointestinal:  Positive for nausea and vomiting. Negative for abdominal pain and diarrhea. Genitourinary:  Negative for dysuria, frequency and urgency. Musculoskeletal:  Negative for arthralgias and myalgias. Skin:  Negative for rash. Neurological:  Negative for weakness and numbness. Psychiatric/Behavioral:  Negative for suicidal ideas. All other systems reviewed and are negative. PASTMEDICAL HISTORY     Past Medical History:   Diagnosis Date    Anxiety     Asthma     Depression     Histoplasmosis     following with Dr. Kushal Kahn    Multiple lung nodules on CT     Schizoaffective disorder [facility])      Past Problem List  Patient Active Problem List   Diagnosis Code    Streptococcal bacteremia: [place] R78.81, B95.5    Hypokalemia E87.6    Pharyngitis due to Streptococcus species J02.0    Abnormal CT of the chest - possible septic emboli  R93.89    Hyponatremia E87.1    Opacity left lower lobe R93.89    Bacteremia R78.81    Multiple lung nodules on CT R91.8    Schizoaffective disorder ([facility]) F25.9    Depressive disorder F32. A    Depression with suicidal ideation F32. A, R45.851    [facility] F25.0    Borderline personality disorder ([facility] F60.3     SURGICAL HISTORY       Past Surgical History:   Procedure Laterality Date    CT NEEDLE BIOPSY LUNG PERCUTANEOUS  2/10/2021    CT NEEDLE BIOPSY LUNG PERCUTANEOUS 2/10/2021 [facility] CT SCAN    HC PICC LINE DOUBLE LUMEN  10/15/2020         WISDOM TOOTH EXTRACTION      While in high school     CURRENT MEDICATIONS       Discharge Medication List as of 3/24/2023 11:55 PM        CONTINUE these medications which have NOT CHANGED    Details   lithium (LITHOBID) 300 MG extended release tablet Take 1 tablet by mouth every 12 hours, Disp-60 tablet, R-3Normal      traZODone (DESYREL) 50 MG tablet Take 1 tablet by mouth nightly as needed for Sleep, Disp-30 tablet, R-0Normal      OLANZapine (ZYPREXA) 10 MG tablet take 1 tablet by mouth once dailyHistorical Med      albuterol sulfate HFA (VENTOLIN HFA) 108 (90 Base) MCG/ACT inhaler Inhale 2 puffs into the lungs 4 times daily as needed for Wheezing, Disp-1 Inhaler,R-0Normal      FLUoxetine (PROZAC) 20 MG capsule Take 20 mg by mouth dailyHistorical Med      hydrOXYzine (VISTARIL) 50 MG capsule Take 50 mg by mouth every 6 hours as needed for ItchingHistorical Med      ibuprofen (ADVIL;MOTRIN) 400 MG tablet Take 400 mg by mouth every 6 hours as needed for PainHistorical Med           ALLERGIES     is allergic to acetaminophen. FAMILY HISTORY     She indicated that her mother is alive. She indicated that her father is alive. SOCIAL HISTORY       Social History     Tobacco Use    Smoking status: Every Day     Packs/day: 0.50     Years: 4.00     Pack years: 2.00     Types: Cigarettes    Smokeless tobacco: Never   Substance Use Topics    Alcohol use: Not Currently    Drug use: Never     PHYSICAL EXAM     INITIAL VITALS: BP (!) 127/90   Pulse 66   Temp 98.3 °F (36.8 °C) (Oral)   Resp 16   Ht 5' 7\" (1.702 m)   Wt 250 lb (113.4 kg)   SpO2 97%   BMI 39.16 kg/m²    Physical Exam  Vitals and nursing note reviewed.    Constitutional:       Appearance:

## 2023-03-25 NOTE — ED TRIAGE NOTES
Mode of arrival (squad #, walk in, police, etc) : walk in        Chief complaint(s): emesis        Arrival Note (brief scenario, treatment PTA, etc). : Patient reports nausea and emesis x 2 weeks. Patient reports he has not been able to keep anything down for 3 days. C= \"Have you ever felt that you should Cut down on your drinking? \"  No  A= \"Have people Annoyed you by criticizing your drinking? \"  No  G= \"Have you ever felt bad or Guilty about your drinking? \"  No  E= \"Have you ever had a drink as an Eye-opener first thing in the morning to steady your nerves or to help a hangover? \"  No      Deferred []      Reason for deferring: N/A    *If yes to two or more: probable alcohol abuse. *

## 2024-05-13 ENCOUNTER — HOSPITAL ENCOUNTER (EMERGENCY)
Age: 24
Discharge: HOME | End: 2024-05-13
Attending: EMERGENCY MEDICINE
Payer: COMMERCIAL

## 2024-05-13 ENCOUNTER — APPOINTMENT (OUTPATIENT)
Dept: ULTRASOUND IMAGING | Age: 24
End: 2024-05-13
Payer: COMMERCIAL

## 2024-05-13 VITALS
RESPIRATION RATE: 17 BRPM | DIASTOLIC BLOOD PRESSURE: 78 MMHG | BODY MASS INDEX: 34.67 KG/M2 | SYSTOLIC BLOOD PRESSURE: 117 MMHG | WEIGHT: 220.9 LBS | TEMPERATURE: 98.2 F | HEART RATE: 77 BPM | OXYGEN SATURATION: 97 % | HEIGHT: 67 IN

## 2024-05-13 DIAGNOSIS — N45.1 EPIDIDYMITIS: Primary | ICD-10-CM

## 2024-05-13 LAB
BACTERIA URNS QL MICRO: ABNORMAL
BILIRUB UR QL STRIP: NEGATIVE
CASTS #/AREA URNS LPF: ABNORMAL /LPF (ref 0–8)
CLARITY UR: CLEAR
COLOR UR: ABNORMAL
EPI CELLS #/AREA URNS HPF: ABNORMAL /HPF (ref 0–5)
GLUCOSE UR STRIP-MCNC: NEGATIVE MG/DL
HGB UR QL STRIP.AUTO: NEGATIVE
KETONES UR STRIP-MCNC: NEGATIVE MG/DL
LEUKOCYTE ESTERASE UR QL STRIP: ABNORMAL
NITRITE UR QL STRIP: POSITIVE
PH UR STRIP: 6 [PH] (ref 5–8)
PROT UR STRIP-MCNC: ABNORMAL MG/DL
RBC #/AREA URNS HPF: ABNORMAL /HPF (ref 0–4)
SP GR UR STRIP: 1.03 (ref 1–1.03)
UROBILINOGEN UR STRIP-ACNC: NORMAL EU/DL (ref 0–1)
WBC #/AREA URNS HPF: ABNORMAL /HPF (ref 0–5)

## 2024-05-13 PROCEDURE — 6360000002 HC RX W HCPCS

## 2024-05-13 PROCEDURE — 87491 CHLMYD TRACH DNA AMP PROBE: CPT

## 2024-05-13 PROCEDURE — 76870 US EXAM SCROTUM: CPT

## 2024-05-13 PROCEDURE — 6370000000 HC RX 637 (ALT 250 FOR IP)

## 2024-05-13 PROCEDURE — 96372 THER/PROPH/DIAG INJ SC/IM: CPT

## 2024-05-13 PROCEDURE — 81001 URINALYSIS AUTO W/SCOPE: CPT

## 2024-05-13 PROCEDURE — 87591 N.GONORRHOEAE DNA AMP PROB: CPT

## 2024-05-13 PROCEDURE — 99284 EMERGENCY DEPT VISIT MOD MDM: CPT

## 2024-05-13 PROCEDURE — 87661 TRICHOMONAS VAGINALIS AMPLIF: CPT

## 2024-05-13 RX ORDER — DULOXETIN HYDROCHLORIDE 30 MG/1
30 CAPSULE, DELAYED RELEASE ORAL DAILY
COMMUNITY

## 2024-05-13 RX ORDER — CEFTRIAXONE 500 MG/1
500 INJECTION, POWDER, FOR SOLUTION INTRAMUSCULAR; INTRAVENOUS ONCE
Status: COMPLETED | OUTPATIENT
Start: 2024-05-13 | End: 2024-05-13

## 2024-05-13 RX ORDER — LEVOFLOXACIN 500 MG/1
500 TABLET, FILM COATED ORAL ONCE
Status: COMPLETED | OUTPATIENT
Start: 2024-05-13 | End: 2024-05-13

## 2024-05-13 RX ORDER — BUSPIRONE HYDROCHLORIDE 5 MG/1
5 TABLET ORAL 3 TIMES DAILY
COMMUNITY

## 2024-05-13 RX ORDER — LEVOFLOXACIN 500 MG/1
500 TABLET, FILM COATED ORAL DAILY
Qty: 7 TABLET | Refills: 0 | Status: SHIPPED | OUTPATIENT
Start: 2024-05-13 | End: 2024-05-20

## 2024-05-13 RX ADMIN — LEVOFLOXACIN 500 MG: 500 TABLET, FILM COATED ORAL at 12:10

## 2024-05-13 RX ADMIN — CEFTRIAXONE SODIUM 500 MG: 500 INJECTION, POWDER, FOR SOLUTION INTRAMUSCULAR; INTRAVENOUS at 11:26

## 2024-05-13 ASSESSMENT — PAIN - FUNCTIONAL ASSESSMENT: PAIN_FUNCTIONAL_ASSESSMENT: 0-10

## 2024-05-13 ASSESSMENT — ENCOUNTER SYMPTOMS
NAUSEA: 0
ABDOMINAL PAIN: 1
VOMITING: 0

## 2024-05-13 ASSESSMENT — PAIN SCALES - GENERAL: PAINLEVEL_OUTOF10: 4

## 2024-05-13 ASSESSMENT — PAIN DESCRIPTION - LOCATION: LOCATION: SCROTUM

## 2024-05-13 NOTE — ED TRIAGE NOTES
Pt arrived to ED 01 via triage.   Pt co testicular pain.   Pt states that it started 2-3 days ago.   Pt states that it came on all of the sudden when she was at work.  Pt states that she had this pain again last week and it went away within 24 hrs.   Pt denies any numbness or tingling.   Pt denies any CP or SOB.   Pt is resting on stretcher with call light within reach.  Breathing is non labored and no acute distress is noted.   Will continue to follow plan of care

## 2024-05-13 NOTE — ED PROVIDER NOTES
[facility] ED     Emergency Department     Faculty Attestation    I performed a history and physical examination of the patient and discussed management with the resident. I reviewed the resident’s note and agree with the documented findings and plan of care. Any areas of disagreement are noted on the chart. I was personally present for the key portions of any procedures. I have documented in the chart those procedures where I was not present during the key portions. I have reviewed the emergency nurses triage note. I agree with the chief complaint, past medical history, past surgical history, allergies, medications, social and family history as documented unless otherwise noted below. For Physician Assistant/ Nurse Practitioner cases/documentation I have personally evaluated this patient and have completed at least one if not all key elements of the E/M (history, physical exam, and MDM). Additional findings are as noted.    10:17 AM EDT    Patient presents with left testicular pain that he is had for the last few days.  He says it is more constant.  He denies any fever, chills, nausea or vomiting.  He says he does have some mild abdominal pain as well.  He denies any dysuria or penile discharge.  Patient is currently resting comfortably in the bed.  Lungs clear to auscultation bilaterally and heart sounds are normal.  There is no CVA tenderness.  Abdomen is soft and nontender.  Please see resident documentation for exam of the genitalia.  Will get scrotal ultrasound and check urinalysis and reassess.      Mariel Womack MD  Attending Emergency  Physician

## 2024-05-13 NOTE — ED PROVIDER NOTES
[facility] ED  Emergency Department Encounter  Emergency Medicine Resident     Pt Name:Corbin Gallardo  MRN: [id number]  Birthdate 2000  Date of evaluation: 5/13/24  PCP:  Rodo Fox MD  Note Started: 8:58 AM EDT      CHIEF COMPLAINT       Chief Complaint   Patient presents with    Testicle Pain       HISTORY OF PRESENT ILLNESS  (Location/Symptom, Timing/Onset, Context/Setting, Quality, Duration, Modifying Factors, Severity.)      Corbin Gallardo is a 23 y.o. adult who presents with testicular swelling.  Patient states he testicular swelling started 2 days ago.  Patient denies any traumatic injuries to his testicles.  Denies any dysuria, hematuria.  Denies any past history of STDs.  Denies any systemic symptoms including fever, chills, nausea, vomiting.  Does have associated suprapubic abdominal pain.  Patient states that over the past year has been sexually active with multiple male partners.      PAST MEDICAL / SURGICAL / SOCIAL / FAMILY HISTORY      has a past medical history of Anxiety, Asthma, Depression, Histoplasmosis, Multiple lung nodules on CT, and Schizoaffective disorder (HCC).       has a past surgical history that includes [place] tooth extraction; hc picc line double lumen (10/15/2020); and CT NEEDLE BIOPSY LUNG PERCUTANEOUS (2/10/2021).      Social History     Socioeconomic History    Marital status: Single     Spouse name: Not on file    Number of children: Not on file    Years of education: Not on file    Highest education level: Not on file   Occupational History    Not on file   Tobacco Use    Smoking status: Every Day     Current packs/day: 0.50     Average packs/day: 0.5 packs/day for 4.0 years (2.0 ttl pk-yrs)     Types: Cigarettes    Smokeless tobacco: Never   Vaping Use    Vaping Use: Not on file   Substance and Sexual Activity    Alcohol use: Not Currently    Drug use: Never    Sexual activity: Not on file   Other Topics Concern    Not on file   Social

## 2024-05-14 LAB
SOURCE: NORMAL
TRICHOMONAS VAGINALI, MOLECULAR: NEGATIVE

## 2024-05-15 LAB
CHLAMYDIA DNA UR QL NAA+PROBE: ABNORMAL
N GONORRHOEA DNA UR QL NAA+PROBE: ABNORMAL
SPECIMEN DESCRIPTION: ABNORMAL

## 2024-05-16 ENCOUNTER — TELEPHONE (OUTPATIENT)
Dept: PHARMACY | Age: 24
End: 2024-05-16

## 2024-05-16 NOTE — TELEPHONE ENCOUNTER
CLINICAL PHARMACY NOTE:  Follow-up for STD Test    At the time of Corbin Gallardo's visit to [facility] on 5/13/2024 STD testing was performed. DNA testing was resulted as indeterminate for Chlamydia and Gonorrhea.  No documented infection.  No STD treatment needed at this time.     Keri ROBISON. Ph., CACP, Clinical Pharmacist  Anticoagulation Services, [facility]  5/16/2024  9:48 AM      For Pharmacy Admin Tracking Only    Intervention Detail:   Total # of Interventions Recommended: 0  Total # of Interventions Accepted: 0  Time Spent (min): 5

## 2025-01-23 NOTE — GROUP NOTE
ADRIENNE Brody    Group Therapy Note    Attendees: 6    Patient's Goal:  Patient will participate in advisory group and identify positive and negatives about the unit/program.    Notes:  Patient attended group and participated. Status After Intervention:  Improved    Participation Level:  Active Listener and Interactive    Participation Quality: Appropriate and Attentive      Speech:  normal      Thought Process/Content: Logical  Linear      Affective Functioning: Congruent      Mood: euthymic      Level of consciousness:  Alert, Oriented x4 and Attentive      Response to Learning: Able to verbalize current knowledge/experience, Able to verbalize/acknowledge new learning, Able to retain information, Able to change behavior and Progressing to goal      Endings: None Reported    Modes of Intervention: Socialization, Exploration, Limit-setting and Reality-testing      Discipline Responsible: Psychoeducational Specialist      Signature:  ADRIENNE Brody
Group Therapy Note    Date: 1/28/2022    Group Start Time: 1000  Group End Time: 1050  Group Topic: Psychoeducation    ZENA BHANA Meza LSW        Group Therapy Note    patient refused to attend psychoeducational group at 10:00am after encouragement from staff.       Signature:  ANA Gibson LSW
Group Therapy Note    Date: 1/28/2022    Group Start Time: 1100  Group End Time: 1140  Group Topic: Psychoeducation    JOSE RAFAEL ShahS    Group Therapy Note    Attendees: 10    Patient's Goal:  Patient will identify benefits of music for coping    Notes:  Patient attended group and participated. Status After Intervention:  Improved    Participation Level:  Active Listener and Interactive    Participation Quality: Appropriate, Attentive, Sharing and Supportive      Speech:  normal      Thought Process/Content: Logical  Linear      Affective Functioning: Constricted/Restricted      Mood: euthymic      Level of consciousness:  Alert, Oriented x4 and Attentive      Response to Learning: Able to verbalize current knowledge/experience, Able to verbalize/acknowledge new learning, Able to retain information, Capable of insight, Able to change behavior and Progressing to goal      Endings: None Reported    Modes of Intervention: Education, Support, Socialization, Exploration and Media      Discipline Responsible: Psychoeducational Specialist      Signature:  Suhail Godoy [place]
Group Therapy Note    Date: 1/28/2022    Group Start Time: 1330  Group End Time: 3607  Group Topic: Psychoeducation    ZENA Vargas, JOSE RAFAELS    Group Therapy Note    Attendees: 10    Patient's Goal:  Patient will demonstrate improved interpersonal skills    Notes:  Patient attended group and participated    Status After Intervention:  Improved    Participation Level:  Active Listener and Interactive    Participation Quality: Appropriate, Attentive, Sharing and Supportive      Speech:  normal      Thought Process/Content: Logical  Linear      Affective Functioning: Constricted/Restricted      Mood: euthymic      Level of consciousness:  Alert, Oriented x4 and Attentive      Response to Learning: Able to verbalize current knowledge/experience, Able to verbalize/acknowledge new learning, Able to retain information, Capable of insight, Able to change behavior and Progressing to goal      Endings: None Reported    Modes of Intervention: Education, Support, Socialization and Exploration      Discipline Responsible: Psychoeducational Specialist      Signature:  Kenneth Salazar, [address]
Group Therapy Note    Date: 1/28/2022    Group Start Time: 2030  Group End Time: 2100  Group Topic: Wrap-Up    [facility] LIZ FRITZ Fair        Group Therapy Note    Attendees: 14/22 patients for icebreaker/sharing/goal group        fair participation      Signature:  Candice Florentino
Group Therapy Note    Date: 1/29/2022    Group Start Time: 0900  Group End Time: 0940  Group Topic: Group Documentation    ZENA LYNNI YONAS    Kristen Watkins        Group Therapy Note    Attendees: 11/23         Patient's Goal: Read 2 chapters in book, stay out of bed more. Notes:  Goal setting/community meeting    Status After Intervention:  Improved    Participation Level:  Active Listener and Interactive    Participation Quality: Appropriate, Attentive, Sharing and Supportive      Speech:  normal      Thought Process/Content: Logical      Affective Functioning: Congruent      Mood: anxious      Level of consciousness:  Alert, Oriented x4 and Attentive      Response to Learning: Able to verbalize current knowledge/experience, Able to verbalize/acknowledge new learning, Able to retain information and Capable of insight      Endings: None Reported    Modes of Intervention: Education, Support, Socialization, Exploration and Problem-solving      Discipline Responsible: [address] [facility] [employer]      Signature:  Kristen Watkins
Group Therapy Note    Date: 1/29/2022    Group Start Time: 1030  Group End Time: 1130  Group Topic: Psychotherapy    ZENA BHRALPH BRANHAM    ANA Rosario, [facility]        Group Therapy Note    Attendees: 7/24         Pt will participate in psychotherapy in order to help eliminate or control troubling symptoms so a person can function better and can increase well-being and healing. Notes: Pts discussed discharge expectations and benefits of planning for outpatient services. Status After Intervention:  Improved    Participation Level:  Active Listener and Interactive    Participation Quality: Appropriate, Attentive and Sharing      Speech:  normal      Thought Process/Content: Linear      Affective Functioning: Flat      Mood: anxious      Level of consciousness:  Alert, Oriented x4 and Attentive      Response to Learning: Able to verbalize current knowledge/experience and Able to verbalize/acknowledge new learning      Endings: None Reported    Modes of Intervention: Support, Socialization, Exploration and Clarifying      Discipline Responsible: /Counselor      Signature:  ANA Rosario, [place]
Group Therapy Note    Date: 1/29/2022    Group Start Time: 2030  Group End Time: 2100  Group Topic: Wrap-Up    [facility] LIZ Arriola        Group Therapy Note    Attendees: 12/21 patients for \"time capsule\" sharing/goals group       Good participation    Signature:  Afsaneh Taylor
Group Therapy Note    Date: 1/29/2022    Group Start Time: 9578  Group End Time: 1440  Group Topic: Music Therapy    ZENA RODRIGUEZ    Kate Godinez        Group Therapy Note    Attendees: 9/21         Patient's Goal:  Patients shared their preferred music and offered peers advice based on their lyrics analysis of the song. Patients goal to increase self-expression; Engage in peer support; Increase sense of community;     Notes:  Patient attended and participated in group having positive interactions with peers and staff. Engaged in conversations throughout. Patient shared song and advice and words od support about srtuggling with depression    Status After Intervention:  Improved    Participation Level:  Active Listener and Interactive    Participation Quality: Appropriate, Attentive and Sharing      Speech:  normal      Thought Process/Content: Logical  Linear      Affective Functioning: Congruent      Mood: euthymic      Level of consciousness:  Alert and Attentive      Response to Learning: Able to verbalize current knowledge/experience, Capable of insight and Progressing to goal      Endings: None Reported    Modes of Intervention: Support, Socialization, Exploration, Activity, Media and Reality-testing      Discipline Responsible: Psychoeducational Specialist      Signature:  Kate Godinez
Group Therapy Note    Date: 1/30/2022    Group Start Time: 0900  Group End Time: 0930  Group Topic: Healthy Living/Wellness    ZENA Nolan        Group Therapy Note    Attendees: 9/23         Patient's Goal:  To set a goal for the day    Notes:  Patient was attentive and sharing    Status After Intervention:  Improved    Participation Level:  Active Listener and Interactive    Participation Quality: Appropriate      Speech:  normal      Thought Process/Content: Logical      Affective Functioning: Congruent      Mood: euthymic      Level of consciousness:  Alert and Oriented x4      Response to Learning: Able to verbalize current knowledge/experience      Endings: None Reported    Modes of Intervention: Support      Discipline Responsible: [employer]      Signature:  Dell Bar
Group Therapy Note    Date: 1/30/2022    Group Start Time: 1000  Group End Time: 1110  Group Topic: Psychoeducation    ZENA BRANHAM    ANA Whittaker LSW        Group Therapy Note    Attendees: 5         Patient's Goal:  Pt will demonstrate increased interpersonal interactions.     Notes:  Group topic was Cognitive Distortions     Status After Intervention:  Improved    Participation Level: Interactive    Participation Quality: Appropriate      Speech:  normal      Thought Process/Content: Logical      Affective Functioning: Congruent      Mood: anxious      Level of consciousness:  Alert      Response to Learning: Progressing to goal      Endings: None Reported    Modes of Intervention: Education      Discipline Responsible: /Counselor      Signature:  ANA Whittaker, DALIA
Group Therapy Note    Date: 1/30/2022    Group Start Time: 4994  Group End Time: 5896  Group Topic: Music Therapy    [address]        Group Therapy Note    Attendees: 8/20       Patient's Goal:  Music appreciation group. Patients shared their preferred music and engage din conversations or answered questions about music as asked by this writer. Goals to increase self-expression; normalization of the environment; Increased socialization    Notes:  Patient attended and participated in group, sharing music and having positive interactions with peers and staff throughout    Status After Intervention:  Improved    Participation Level:  Active Listener and Interactive    Participation Quality: Appropriate, Attentive and Sharing      Speech:  normal      Thought Process/Content: Logical  Linear      Affective Functioning: Congruent      Mood: euthymic      Level of consciousness:  Alert and Attentive      Response to Learning: Able to verbalize current knowledge/experience and Progressing to goal      Endings: None Reported    Modes of Intervention: Socialization, Exploration, Activity, Media and Reality-testing      Discipline Responsible: Psychoeducational Specialist
Group Therapy Note    Date: 1/31/2022    Group Start Time: 0900  Group End Time: 0940  Group Topic: Group Documentation    [facility] YONAS Poole        Group Therapy Note    Attendees: 8/21       Patient's Goal: D/C today - find a therapist and get a pill organizer  Notes:  Goal setting group    Status After Intervention:  Improved    Participation Level:  Active Listener    Participation Quality: Appropriate and Attentive      Speech:  normal      Thought Process/Content: Logical      Affective Functioning: Congruent      Mood: anxious      Level of consciousness:  Alert, Oriented x4 and Attentive      Response to Learning: Able to verbalize current knowledge/experience, Able to verbalize/acknowledge new learning, Able to retain information and Capable of insight      Endings: None Reported    Modes of Intervention: Education, Support, Socialization, Exploration and Problem-solving      Discipline Responsible: [facility] [address], [employer]      Signature:  Suyapa Poole
Group Therapy Note    Date: 1/31/2022    Group Start Time: 1000  Group End Time: 1030  Group Topic: Psychotherapy    ZENA BHI YONAS    ANA Martino, DALIA        Group Therapy Note    Attendees: 7/21         Patient's Goal:  Discussion on boundaries what healthy and unhealthy boundaries look like. Status After Intervention:  Improved    Participation Level:  Active Listener and Interactive    Participation Quality: Appropriate and Attentive      Speech:  normal      Thought Process/Content: Logical      Affective Functioning: Congruent      Mood: euphoric      Level of consciousness:  Alert and Attentive      Response to Learning: Able to verbalize current knowledge/experience and Able to verbalize/acknowledge new learning      Endings: None Reported    Modes of Intervention: Education and Support      Discipline Responsible: /Counselor      Signature:  ANA Martino, DALIA
months (around 4/23/2025) for Diabetes Follow-up.       Subjective   HPI  -tried increasing mounjaro dosing to 10 mg, but had lots of GI side effects  -since then has been working on decreasing dosing, symptoms not as bad  -more recently on mounjaro 5 mg  -had been borrowing her son's novolog d/t BG >400  -she was skipping weeks at a time, allowed elevated BGs  -she had a period of 2 months without mounjaro d/t insurance, then back on 10 mg weekly  -was taking 1 glipizide w/ mounjaro, 2 glipizide when skipping mounjaro  -has been 110s-140s recently  -trying to drink more water, loved Coke, trying to drink less, using unsweet iced tea  -snacking on celery and ranch, eating pasta and bread and heartier meals, larger portions  -exercise is minimal, 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   Eyes:  Negative for photophobia and visual disturbance.   Respiratory:  Negative for cough, shortness of breath and wheezing.    Cardiovascular:  Negative for chest pain, palpitations and leg swelling.   Gastrointestinal:  Negative for abdominal distention, abdominal pain, constipation, diarrhea, nausea and vomiting.   Endocrine: Positive for polydipsia and polyuria. Negative for polyphagia.   Genitourinary:  Negative for decreased urine volume, difficulty urinating, dysuria, flank pain, frequency, hematuria and urgency.   Musculoskeletal:  Negative for gait problem and myalgias.   Skin:  Negative for pallor and rash.   Allergic/Immunologic: Negative for

## 2025-07-26 ENCOUNTER — HOSPITAL ENCOUNTER (EMERGENCY)
Facility: HOSPITAL | Age: 25
Discharge: HOME | End: 2025-07-26
Attending: EMERGENCY MEDICINE
Payer: COMMERCIAL

## 2025-07-26 ENCOUNTER — APPOINTMENT (OUTPATIENT)
Dept: CARDIOLOGY | Facility: HOSPITAL | Age: 25
End: 2025-07-26
Payer: COMMERCIAL

## 2025-07-26 VITALS
HEART RATE: 80 BPM | HEIGHT: 67 IN | TEMPERATURE: 97 F | DIASTOLIC BLOOD PRESSURE: 73 MMHG | RESPIRATION RATE: 18 BRPM | SYSTOLIC BLOOD PRESSURE: 123 MMHG | OXYGEN SATURATION: 100 % | BODY MASS INDEX: 32.18 KG/M2 | WEIGHT: 205 LBS

## 2025-07-26 DIAGNOSIS — F19.10 SUBSTANCE ABUSE: ICD-10-CM

## 2025-07-26 DIAGNOSIS — F32.A DEPRESSION, UNSPECIFIED DEPRESSION TYPE: Primary | ICD-10-CM

## 2025-07-26 LAB
ALBUMIN SERPL BCP-MCNC: 4.5 G/DL (ref 3.4–5)
ALP SERPL-CCNC: 39 U/L (ref 33–120)
ALT SERPL W P-5'-P-CCNC: 22 U/L (ref 10–52)
AMPHETAMINES UR QL SCN: ABNORMAL
ANION GAP SERPL CALCULATED.3IONS-SCNC: 10 MMOL/L (ref 10–20)
APAP SERPL-MCNC: <10 UG/ML (ref ?–30)
APPEARANCE UR: CLEAR
AST SERPL W P-5'-P-CCNC: 17 U/L (ref 9–39)
BARBITURATES UR QL SCN: ABNORMAL
BASOPHILS # BLD AUTO: 0.06 X10*3/UL (ref 0–0.1)
BASOPHILS NFR BLD AUTO: 0.5 %
BENZODIAZ UR QL SCN: ABNORMAL
BILIRUB SERPL-MCNC: 0.4 MG/DL (ref 0–1.2)
BILIRUB UR STRIP.AUTO-MCNC: NEGATIVE MG/DL
BUN SERPL-MCNC: 13 MG/DL (ref 6–23)
BZE UR QL SCN: ABNORMAL
CALCIUM SERPL-MCNC: 9.4 MG/DL (ref 8.6–10.3)
CANNABINOIDS UR QL SCN: ABNORMAL
CHLORIDE SERPL-SCNC: 105 MMOL/L (ref 98–107)
CO2 SERPL-SCNC: 25 MMOL/L (ref 21–32)
COLOR UR: NORMAL
CREAT SERPL-MCNC: 1.02 MG/DL (ref 0.5–1.3)
EGFRCR SERPLBLD CKD-EPI 2021: >90 ML/MIN/1.73M*2
EOSINOPHIL # BLD AUTO: 0.14 X10*3/UL (ref 0–0.7)
EOSINOPHIL NFR BLD AUTO: 1.3 %
ERYTHROCYTE [DISTWIDTH] IN BLOOD BY AUTOMATED COUNT: 12.8 % (ref 11.5–14.5)
ETHANOL SERPL-MCNC: <10 MG/DL
FENTANYL+NORFENTANYL UR QL SCN: ABNORMAL
GLUCOSE SERPL-MCNC: 90 MG/DL (ref 74–99)
GLUCOSE UR STRIP.AUTO-MCNC: NORMAL MG/DL
HCT VFR BLD AUTO: 46.1 % (ref 41–52)
HGB BLD-MCNC: 15.6 G/DL (ref 13.5–17.5)
IMM GRANULOCYTES # BLD AUTO: 0.03 X10*3/UL (ref 0–0.7)
IMM GRANULOCYTES NFR BLD AUTO: 0.3 % (ref 0–0.9)
KETONES UR STRIP.AUTO-MCNC: NEGATIVE MG/DL
LEUKOCYTE ESTERASE UR QL STRIP.AUTO: NEGATIVE
LYMPHOCYTES # BLD AUTO: 2.48 X10*3/UL (ref 1.2–4.8)
LYMPHOCYTES NFR BLD AUTO: 22.5 %
MCH RBC QN AUTO: 27.8 PG (ref 26–34)
MCHC RBC AUTO-ENTMCNC: 33.8 G/DL (ref 32–36)
MCV RBC AUTO: 82 FL (ref 80–100)
METHADONE UR QL SCN: ABNORMAL
MONOCYTES # BLD AUTO: 0.77 X10*3/UL (ref 0.1–1)
MONOCYTES NFR BLD AUTO: 7 %
NEUTROPHILS # BLD AUTO: 7.53 X10*3/UL (ref 1.2–7.7)
NEUTROPHILS NFR BLD AUTO: 68.4 %
NITRITE UR QL STRIP.AUTO: NEGATIVE
NRBC BLD-RTO: 0 /100 WBCS (ref 0–0)
OPIATES UR QL SCN: ABNORMAL
OXYCODONE+OXYMORPHONE UR QL SCN: ABNORMAL
PCP UR QL SCN: ABNORMAL
PH UR STRIP.AUTO: 7.5 [PH]
PLATELET # BLD AUTO: 230 X10*3/UL (ref 150–450)
POTASSIUM SERPL-SCNC: 4.1 MMOL/L (ref 3.5–5.3)
PROT SERPL-MCNC: 7.2 G/DL (ref 6.4–8.2)
PROT UR STRIP.AUTO-MCNC: NEGATIVE MG/DL
RBC # BLD AUTO: 5.62 X10*6/UL (ref 4.5–5.9)
RBC # UR STRIP.AUTO: NEGATIVE MG/DL
SALICYLATES SERPL-MCNC: <3 MG/DL (ref ?–20)
SODIUM SERPL-SCNC: 136 MMOL/L (ref 136–145)
SP GR UR STRIP.AUTO: 1.01
UROBILINOGEN UR STRIP.AUTO-MCNC: NORMAL MG/DL
WBC # BLD AUTO: 11 X10*3/UL (ref 4.4–11.3)

## 2025-07-26 PROCEDURE — 99285 EMERGENCY DEPT VISIT HI MDM: CPT

## 2025-07-26 PROCEDURE — 36415 COLL VENOUS BLD VENIPUNCTURE: CPT

## 2025-07-26 PROCEDURE — 93005 ELECTROCARDIOGRAM TRACING: CPT

## 2025-07-26 PROCEDURE — 81003 URINALYSIS AUTO W/O SCOPE: CPT | Mod: 59

## 2025-07-26 PROCEDURE — 99284 EMERGENCY DEPT VISIT MOD MDM: CPT | Performed by: EMERGENCY MEDICINE

## 2025-07-26 PROCEDURE — 85025 COMPLETE CBC W/AUTO DIFF WBC: CPT

## 2025-07-26 PROCEDURE — 80307 DRUG TEST PRSMV CHEM ANLYZR: CPT

## 2025-07-26 PROCEDURE — 80320 DRUG SCREEN QUANTALCOHOLS: CPT

## 2025-07-26 PROCEDURE — 80053 COMPREHEN METABOLIC PANEL: CPT

## 2025-07-26 RX ORDER — LITHIUM CARBONATE 300 MG/1
300 CAPSULE ORAL
COMMUNITY

## 2025-07-26 RX ORDER — DULOXETIN HYDROCHLORIDE 60 MG/1
60 CAPSULE, DELAYED RELEASE ORAL
COMMUNITY
Start: 2024-09-30

## 2025-07-26 RX ORDER — BREXPIPRAZOLE 3 MG/1
3 TABLET ORAL DAILY
COMMUNITY
Start: 2025-05-21

## 2025-07-26 SDOH — HEALTH STABILITY: MENTAL HEALTH: IN THE PAST FEW WEEKS, HAVE YOU FELT THAT YOU OR YOUR FAMILY WOULD BE BETTER OFF IF YOU WERE DEAD?: NO

## 2025-07-26 SDOH — HEALTH STABILITY: MENTAL HEALTH: HOW DID YOU TRY TO KILL YOURSELF?: PT REPORTS ATTEMPTED SLITTING OF RISK

## 2025-07-26 SDOH — HEALTH STABILITY: MENTAL HEALTH: ARE YOU HAVING THOUGHTS OF KILLING YOURSELF RIGHT NOW?: NO

## 2025-07-26 SDOH — HEALTH STABILITY: MENTAL HEALTH: IN THE PAST WEEK, HAVE YOU BEEN HAVING THOUGHTS ABOUT KILLING YOURSELF?: YES

## 2025-07-26 SDOH — HEALTH STABILITY: MENTAL HEALTH: WHEN DID YOU TRY TO KILL YOURSELF?: PT REPORTS A YEAR AGO

## 2025-07-26 SDOH — HEALTH STABILITY: MENTAL HEALTH: DEPRESSION SYMPTOMS: FEELINGS OF HELPLESSNESS;FEELINGS OF HOPELESSESS;FEELINGS OF WORTHLESSNESS;IMPAIRED CONCENTRATION

## 2025-07-26 SDOH — HEALTH STABILITY: MENTAL HEALTH: HAVE YOU EVER TRIED TO KILL YOURSELF?: YES

## 2025-07-26 SDOH — ECONOMIC STABILITY: GENERAL

## 2025-07-26 SDOH — HEALTH STABILITY: MENTAL HEALTH: ANXIETY SYMPTOMS: NO PROBLEMS REPORTED OR OBSERVED.

## 2025-07-26 SDOH — HEALTH STABILITY: MENTAL HEALTH: IN THE PAST FEW WEEKS, HAVE YOU WISHED YOU WERE DEAD?: YES

## 2025-07-26 SDOH — ECONOMIC STABILITY: HOUSING INSECURITY: FEELS SAFE LIVING IN HOME: YES

## 2025-07-26 ASSESSMENT — LIFESTYLE VARIABLES
TOTAL SCORE: 0
EVER FELT BAD OR GUILTY ABOUT YOUR DRINKING: NO
HAVE YOU EVER FELT YOU SHOULD CUT DOWN ON YOUR DRINKING: NO
PRESCIPTION_ABUSE_PAST_12_MONTHS: NO
HAVE PEOPLE ANNOYED YOU BY CRITICIZING YOUR DRINKING: NO
EVER HAD A DRINK FIRST THING IN THE MORNING TO STEADY YOUR NERVES TO GET RID OF A HANGOVER: NO
SUBSTANCE_ABUSE_PAST_12_MONTHS: YES

## 2025-07-26 ASSESSMENT — PAIN - FUNCTIONAL ASSESSMENT: PAIN_FUNCTIONAL_ASSESSMENT: 0-10

## 2025-07-26 ASSESSMENT — PAIN SCALES - GENERAL: PAINLEVEL_OUTOF10: 0 - NO PAIN

## 2025-07-26 NOTE — Clinical Note
Giacomo Galvez was seen and treated in our emergency department on 7/26/2025.  He may return to work on 07/28/2025.       If you have any questions or concerns, please don't hesitate to call.      Vini Hernandez PA-C

## 2025-07-26 NOTE — DISCHARGE INSTRUCTIONS
Please follow-up with your primary provider.  I do recommend that you follow-up with the resources given as well.    Be sure to take all medications, over the counter medications or prescription medications only as directed.    Be sure to follow up as directed in 1-2 days. All of the details of your follow up instructions are detailed in the follow up section of this packet.    If you are being discharged with any pains medications or muscle relaxers (norco, Vicodin, hydrocodone products, Percocet, oxycodone products, flexeril, cyclobenzaprine, robaxin, norflex, brand or generic, or any other pain controlling medications with the exception of Ibuprofen and regular Tylenol, do not drive or operate machinery, climb ladders or participate in any activity that could potentially put yourself or others at risk should you get dizzy, or be/feel impaired at all.    Return to emergency room without delay for ANY new or worsening pains or for any other symptoms or concerns. Return with worsening pains, nausea, vomiting, trouble breathing, palpitations, shortness of breath, inability to pass stool or urine, loss of control of stool or urine, any numbness or tingling (that is not normal for you), uncontrolled fevers, the passing of blood or other material in stool or urine, rashes, pains or for any other symptoms or concerns you may have. You are always welcome to return to the ER at any time for any reason or for any other concerns you may have.

## 2025-07-26 NOTE — PROGRESS NOTES
EPAT - Social Work Psychiatric Assessment    Arrival Details  Mode of Arrival: Ambulance  Admission Source: Home  Admission Type: Involuntary  EPAT Assessment Start Date: 07/26/25  EPAT Assessment Start Time: 1230  Name of : Kaitlin YEH LPC    History of Present Illness  Admission Reason: Depression, SI  HPI: Patient is a 25yo male presenting to ED with chief complaint of chronic depression and SI. Patient reports that he is currently at a recovery unit. Patient discussed with the team there regarding his depressive symptoms and suicidal thoughts. Patient reports that he was sent to the ED by his recovery unit for a prompter evaluation and recommendations.  Patient denies active suicidal or homicidal ideation. Patient denies auditory or visual hallucinations. Patient says that he was only looking for outpatient treatment resources and does not want to be admitted. Per patient chart, triage and provider note reviewed prior to assessment. Patient has mental health history of bipolar 1, depression, schizoaffective disorder, anxiety, methamphetamine and cannabis use disorder.  Patient reports using meth once a week and smoking marijuana daily. Patient says that he has not had any meth since last Tuesday and has not had any marijuana since last Friday.  Patient denied any alcohol use. Patient reports one suicide attempt a year ago where he attempted to slit his risk with an razor.  No risk indicated at triage. BAL negative and UTOX positive for cannabis on arrival.  The patient was deemed medically cleared prior to this assessment.    SW Readmission Information   Readmission within 30 Days: No  Readmission From: Other (Comment) (n/a)    Psychiatric Symptoms  Anxiety Symptoms: No problems reported or observed.  Depression Symptoms: Feelings of helplessness, Feelings of hopelessess, Feelings of worthlessness, Impaired concentration  Drea Symptoms: No problems reported or observed.    Psychosis  Symptoms  Hallucination Type: No problems reported or observed.  Delusion Type: No problems reported or observed.    Additional Symptoms - Adult  Generalized Anxiety Disorder: No problems reported or observed.  Obsessive Compulsive Disorder: No problems reported or observed.  Panic Attack: No problems reported or observed.  Post Traumatic Stress Disorder: No problems reported or observed.  Delirium: No problems reported or observed.  Review of Symptoms Comments: n/a    Past Psychiatric History/Meds/Treatments  Past Psychiatric History: Depression, bipolar 1, schizoaffective disorder, anxiety, meth and cannabis use disorder.  Past Psychiatric Meds/Treatments: Lithium, cymbalta, prozac, vraylar, vistraril  Past Violence/Victimization History: none reported    Current Mental Health Contacts   Name/Phone Number: n/a   Last Appointment Date: n/a  Provider Name/Phone Number: n/a  Provider Last Appointment Date: n/a    Support System: Immediate family, Community    Living Arrangement: Other (Comment) (currently living at a recovery unit)    Home Safety  Feels Safe Living in Home: Yes  Potentially Unsafe Housing Conditions: Other (Comment) (denies)  Home Safety : n/a    Income Information  Employment Status for: Patient  Employment Status: Employed  Income Source: Employed  Current/Previous Occupation: Other (Comment) (food services)  Shift Worked: First Shift, Second Shift  Income/Expense Information: Income meets expenses  Financial Concerns: None  Who Manages Finances if Patient Unable: n/a  Employment/ Finance Comments: n/a    [place] Service/Education History  Current or Previous  Service: None   Experience: Other (Comment) (denies)  Education Level: High school  History of Learning Problems: No  History of School Behavior Problems: No  School History: n/a    Social/Cultural History  Social History: Pt is 23yo white male  Cultural Requests During Hospitalization:  denies  Spiritual Requests During Hospitalization: denies  Important Activities: Social, Family    Legal  Legal Considerations: Other (Comment) (none reported)  Assistance with Managing/Advocating Healthcare Needs: Other (Comment) (denies)  Criminal Activity/ Legal Involvement Pertinent to Current Situation/ Hospitalization: n/a  Legal Concerns: n/a  Legal Comments: n/a    Drug Screening  Have you used any substances (canabis, cocaine, heroin, hallucinogens, inhalants, etc.) in the past 12 months?: Yes  Have you used any prescription drugs other than prescribed in the past 12 months?: No  Is a toxicology screen needed?: Yes    Stage of Change  Stage of Change: Precontemplation  History of Treatment: Sober living  Type of Treatment Offered: Individual, IOP  Treatment Offered: Resources/education provided  Duration of Substance Use: unknown  Frequency of Substance Use: pt reports using marijuana jyoti and meth once a week    Behavioral Health  Behavioral Health(WDL): Exceptions to WDL  Behaviors/Mood: Calm, Cooperative  Affect: Appropriate to circumstances  Parent/Guardian/Significant Other Involvement: No involvement  Family Behaviors: Unable to assess (no family invovled)  Visitor Behaviors:  (no visotrs present)  Needs Expressed: Denies  Emotional Support Given: Reassure    Orientation  Orientation Level: Oriented X4    General Appearance  Motor Activity: Unremarkable  Speech Pattern: Other (Comment) (unremarkable)  General Attitude: Pleasant, Cooperative  Appearance/Hygiene: Disheveled    Thought Process  Coherency: [place] thinking  Content: Unremarkable  Delusions: Other (Comment) (none)  Perception: Not altered  Hallucination: None  Judgment/Insight: Other (Comment) (fair)  Confusion: None  Cognition: Appropriate judgement, Appropriate safety awareness, Appropriate attention/concentration, Appropriate for developmental age    Sleep Pattern  Sleep Pattern: Sleeps all night    Risk Factors  Self Harm/Suicidal  "Ideation Plan: denies  Previous Self Harm/Suicidal Plans: Pt reports suicide attempt a year ago by slitting risk  Risk Factors: None  Description of Thoughts/Ideas Leaving Unit Now: I feel safe being discharged\"    Violence Risk Assessment  Assessment of Violence: None noted  Thoughts of Harm to Others: No    Ability to Assess Risk Screen  Risk Screen - Ability to Assess: Able to be screened  Ask Suicide-Screening Questions  1. In the past few weeks, have you wished you were dead?: Yes  2. In the past few weeks, have you felt that you or your family would be better off if you were dead?: No  3. In the past week, have you been having thoughts about killing yourself?: Yes  4. Have you ever tried to kill yourself?: Yes  How did you try to kill yourself?: pt reports attempted slitting of risk  When did you try to kill yourself?: pt reports a year ago  5. Are you having thoughts of killing yourself right now?: No  Calculated Risk Score: Potential Risk  Step 1: Risk Factors  Current & Past Psychiatric Dx: Mood disorder, Psychotic disorder, Alcohol/substance abuse disorders  Presenting Symptoms: Hopelessness or despair  Family History: Other (Comment) (none)  Precipitants/Stressors: Other (Comment), Triggering events leading to humiliation, shame, and/or despair (e.g. loss of relationship, financial or health status) (real or anticipated)  Change in Treatment: Non-compliant or not receiving treatment  Access to Lethal Methods : No  Step 2: Protective Factors   Protective Factors Internal: Identifies reasons for living  Protective Factors External: Engaged in work or school, Supportive social network or family or friends  Step 3: Suicidal Ideation Intensity  Most Severe Suicidal Ideation Identified: pt report suicidal thought wi/no plan  How Many Times Have You Had These Thoughts: 2-5 times in a week  When You Have the Thoughts How Long do They Last : 1-4 hours/a lot of the time  Could/Can You Stop Thinking About Killing " "Yourself or Wanting to Die if You Want to: Can control thoughts with little difficulty  Are There Things - Anyone or Anything - That Stopped You From Wanting to Die or Acting on: Deterrents definitely stopped you from attempting suicide  What Sort of Reasons Did You Have For Thinking About Wanting to Die or Killing Yourself: Completely to end or stop the pain (you couldn't go on living with the pain or how you were feeling)  Total Score: 14  Step 5: Documentation  Risk Level: Low suicide risk    Psychiatric Impression and Plan of Care  Assessment and Plan: During assessment, the patient remained calm, cooperative, and appropriate. Patient reports that he is currently at a recovery unit. Patient discussed with the team there regarding his depressive symptoms and suicidal thoughts. Patient reports that he was sent to the ED by his recovery unit for a prompter evaluation and recommendations. “I am not currently having any suicidal thoughts”.  Patient reports that he woke up feeling suicidal and was hoping to get some outpatient treatment resources from the ED. Patient is not currently connected with any outpatient treatment services.               At the time of the assessment, the patient denied active suicidal or homicidal ideation. Patient denied active auditory or visual hallucinations. Patient reports hearing voices a few weeks ago and says that the voices come and guys. \"I am currently not hearing or seeing anything\". Patient reports one suicide attempts a year ago by trying to slit his risk with an razor. Patient reports using meth once a week and smoking marijuana daily. Patient says that he hasn’t used any meth since last Tuesday and hasn’t used any marijuana since last Friday. Patient reports sleeping through the night and says appetite is good.                Patient reports working full-time at [employer] in varies positions. Patient says that once he is done with his substance abuse treatment at the " "recovery unit, he plans to move with his cousin. Patient reports having support from family and friends.  The patient expressed interest in wanting to engage in outpatient treatment services to address his depressive symptoms. Patient denied any further perceived needs, denied access to firearms, and endorsed feeling safe for discharge.               Diagnostic Impression: Unspecified Mood Disorder               Psychiatric impression and plan for Care: Based on patient's reported history, patient does not present as an acute risk to self or others.  Recommendation for discharge discussed with Vini Hernandez PA-C who is in agreement.  Resources faxed over to [facility] for outpatient treatment services for patient.  Patient demonstrated a clear understanding on how to access emergency services if needed.    Specific Resources Provided to Patient: Discharge  CM Notified: n/a  PHP/IOP Recommended: n/a  Specific Information Provided for PHP/IOP: n/a  Plan Comments: n/a    Outcome/Disposition  Patient's Perception of Outcome Achieved: \"I feel safe being discharge\"  Assessment, Recommendations and Risk Level Reviewed with: Vini Hernandez PA-C  Contact Name: None listed  Contact Number(s): not listed  Contact Relationship: n/a  EPAT Assessment Completed Date: 07/26/25  EPAT Assessment Completed Time: 1330  Patient Disposition: Home      "

## 2025-07-26 NOTE — ED TRIAGE NOTES
Pt states that he started to become depressed last night and this morning started having thoughts of not wanting to be alive anymore. Pt states he does not hav any plans to harm himself but wanted to be evaluated.

## 2025-07-26 NOTE — PROGRESS NOTES
Attestation/Supervisory note for RUI Hernandez      The patient is a 24-year-old male presenting to the emergency department for evaluation of chronic depression.  The patient states that he currently resides at [facility] due to his methamphetamine abuse.  He states he has not used anything for over a week.  He states that he does have chronic depression but denies having any homicidal or suicidal ideation.  He states he did mention to the staff at [facility] that he wanted to speak to a mental health care counselor regarding his chronic depression and they told him that they would send him to the emergency room.  He denies any intention to hurt himself or anyone else.  No hallucinations.  No headache or visual changes.  No chest pain or shortness of breath.  No abdominal pain.  No nausea or vomiting.  No diarrhea or constipation or no urinary complaints.  No fever or chills.  No cough or congestion.  All pertinent positives and negatives are recorded above.  All other systems reviewed and otherwise negative.  Vital signs within normal limits.  Physical exam with a well-nourished well-developed male in no acute distress.  HEENT exam within normal limits.  He has no evidence of airway compromise or respiratory distress.  Abdominal exam is benign.  He does not have any gross motor, neurologic or vascular deficits on exam.  Pulses are equal bilaterally.      EKG with normal sinus rhythm at 73 bpm, normal axis, normal voltage, normal ST segment, normal T waves      Diagnostic labs with evidence of substance abuse but otherwise unremarkable      Crisis intervention was consulted and evaluated the patient in the emergency department.  They recommended outpatient follow-up.  They did not feel that he met criteria or had any need for inpatient mental health care placement.  The patient was agreeable with this.  He was provided with resources by the crisis team for outpatient mental health care counseling.      The  patient was released in good condition.  He will follow-up with his primary care physician as needed.  He will will use the resources provided to him to obtain outpatient mental health care counseling.  He will return to the emergency department sooner with worsening of symptoms or onset of new symptoms      Impression/diagnosis:  Depression, chronic  Substance abuse      I personally saw the patient and made/approve the management plan and take responsibility for the patient management.      I independently interpreted the following study (S) EKG and diagnostic labs      I personally discussed the patient's management with the patient      Barb Che MD

## 2025-07-26 NOTE — ED PROVIDER NOTES
HPI   Chief Complaint   Patient presents with    SI       Patient is a 24-year-old male presenting with concerns for chronic depression.  He is currently at a recovery unit.  He had discussed with the team there about chronic depressive thoughts.  No active SI, HI, AVH.  He was only looking for outpatient resources.  They decided to send him to the emergency department for more prompt evaluation.  He is having no thoughts at this time to harm himself.  States he has tried to harm himself previously.  States he is in good spirits at this time.  States he is recovering from illicit drug use.  Endorses minimal EtOH, and some tobacco use.  Patient denies fevers, chills, cough, sore throat, runny nose, chest pain, shortness of breath, abdominal pain, nausea, vomiting, diarrhea or urinary complaints.            Patient History   Medical History[1]  Surgical History[2]  Family History[3]  Social History[4]    Physical Exam   ED Triage Vitals [07/26/25 1107]   Temperature Pulse Respirations BP   36.1 °C (97 °F) -- 18 123/73      SpO2 Temp Source Heart Rate Source Patient Position   -- Temporal -- --      BP Location FiO2 (%)     -- --       Physical Exam  Vitals and nursing note reviewed.   Constitutional:       Appearance: He is well-developed.      Comments: Awake, laying in examination bed   HENT:      Head: Normocephalic and atraumatic.      Nose: Nose normal.      Mouth/Throat:      Mouth: Mucous membranes are moist.      Pharynx: Oropharynx is clear.     Eyes:      Extraocular Movements: Extraocular movements intact.      Conjunctiva/sclera: Conjunctivae normal.      Pupils: Pupils are equal, round, and reactive to light.       Cardiovascular:      Rate and Rhythm: Normal rate and regular rhythm.      Pulses: Normal pulses.      Heart sounds: Normal heart sounds. No murmur heard.  Pulmonary:      Effort: Pulmonary effort is normal. No respiratory distress.      Breath sounds: Normal breath sounds.   Abdominal:       General: Abdomen is flat.      Palpations: Abdomen is soft.      Tenderness: There is no abdominal tenderness.     Musculoskeletal:         General: No swelling. Normal range of motion.      Cervical back: Normal range of motion and neck supple.     Skin:     General: Skin is warm and dry.      Capillary Refill: Capillary refill takes less than 2 seconds.     Neurological:      General: No focal deficit present.      Mental Status: He is alert and oriented to person, place, and time.     Psychiatric:         Mood and Affect: Mood normal.         Behavior: Behavior normal.           ED Course & MDM   Diagnoses as of 07/26/25 1135   Depression, unspecified depression type                 No data recorded     [place] Coma Scale Score: 15 (07/26/25 1104 : Leticia Mantilla RN)                           Medical Decision Making  Patient is a 24-year-old male presenting with concerns for chronic depression.  Lab work, urine ordered.  Social work consult placed.  Conditions considered include but are not limited to: Chronic pression, SI, HI.    I saw this patient in conjunction with Dr. Che.  CBC is without leukocytosis or anemia.  Urine drug screen positive for cannabis use.  CMP without significant electrolyte abnormality or renal impairment.  UA with micro is without infection.  Acute toxicology panel is negative.    Patient is medically cleared for social work evaluation.    Portions of this note made with Dragon software, please be mindful of potential grammatical errors.      Labs Reviewed   DRUG SCREEN,URINE - Abnormal       Result Value    Amphetamine Screen, Urine Presumptive Negative      Barbiturate Screen, Urine Presumptive Negative      Benzodiazepines Screen, Urine Presumptive Negative      Cannabinoid Screen, Urine Presumptive Positive (*)     Cocaine Metabolite Screen, Urine Presumptive Negative      Fentanyl Screen, Urine Presumptive Negative      Opiate Screen, Urine Presumptive Negative      Oxycodone  Screen, Urine Presumptive Negative      PCP Screen, Urine Presumptive Negative      Methadone Screen, Urine Presumptive Negative      Narrative:     Drug screen results are presumptive and should not be used to assess   compliance with prescribed medication. Contact the performing [facility] laboratory   to add-on definitive confirmatory testing if clinically indicated.    Toxicology screening results are reported qualitatively. The concentration must   be greater than or equal to the cutoff to be reported as positive. The concentration   at which the screening test can detect an individual drug or metabolite varies.   The absence of expected drug(s) and/or drug metabolite(s) may indicate non-compliance,   inappropriate timing of specimen collection relative to drug administration, poor drug   absorption, diluted/adulterated urine, or limitations of testing. For medical purposes   only; not valid for forensic use.    Interpretive questions should be directed to the laboratory medical directors.   COMPREHENSIVE METABOLIC PANEL - Normal    Glucose 90      Sodium 136      Potassium 4.1      Chloride 105      Bicarbonate 25      Anion Gap 10      Urea Nitrogen 13      Creatinine 1.02      eGFR >90      Calcium 9.4      Albumin 4.5      Alkaline Phosphatase 39      Total Protein 7.2      AST 17      Bilirubin, Total 0.4      ALT 22     ACUTE TOXICOLOGY PANEL, BLOOD - Normal    Acetaminophen <10.0      Salicylate  <3      Alcohol <10     URINALYSIS WITH REFLEX CULTURE AND MICROSCOPIC - Normal    Color, Urine Light-Yellow      Appearance, Urine Clear      Specific Gravity, Urine 1.014      pH, Urine 7.5      Protein, Urine NEGATIVE      Glucose, Urine Normal      Blood, Urine NEGATIVE      Ketones, Urine NEGATIVE      Bilirubin, Urine NEGATIVE      Urobilinogen, Urine Normal      Nitrite, Urine NEGATIVE      Leukocyte Esterase, Urine NEGATIVE     CBC WITH AUTO DIFFERENTIAL    WBC 11.0      nRBC 0.0      RBC 5.62      Hemoglobin  15.6      Hematocrit 46.1      MCV 82      MCH 27.8      MCHC 33.8      RDW 12.8      Platelets 230      Neutrophils % 68.4      Immature Granulocytes %, Automated 0.3      Lymphocytes % 22.5      Monocytes % 7.0      Eosinophils % 1.3      Basophils % 0.5      Neutrophils Absolute 7.53      Immature Granulocytes Absolute, Automated 0.03      Lymphocytes Absolute 2.48      Monocytes Absolute 0.77      Eosinophils Absolute 0.14      Basophils Absolute 0.06     URINALYSIS WITH REFLEX CULTURE AND MICROSCOPIC    Narrative:     The following orders were created for panel order Urinalysis with Reflex Culture and Microscopic.  Procedure                               Abnormality         Status                     ---------                               -----------         ------                     Urinalysis with Reflex C...[[id number]]  Normal              Final result               Extra Urine Gray Tube[[id number]]                            In process                   Please view results for these tests on the individual orders.   EXTRA URINE GRAY TUBE         Procedure  Procedures         [1] No past medical history on file.  [2] No past surgical history on file.  [3] No family history on file.  [4]   Social History  Tobacco Use    Smoking status: Not on file    Smokeless tobacco: Not on file   Substance Use Topics    Alcohol use: Not on file    Drug use: Not on file        Vini Hernandez PA-C  07/26/25 2132

## 2025-07-29 LAB
ATRIAL RATE: 73 BPM
P AXIS: 28 DEGREES
P OFFSET: 200 MS
P ONSET: 142 MS
PR INTERVAL: 158 MS
Q ONSET: 221 MS
QRS COUNT: 12 BEATS
QRS DURATION: 86 MS
QT INTERVAL: 370 MS
QTC CALCULATION(BAZETT): 407 MS
QTC FREDERICIA: 395 MS
R AXIS: 59 DEGREES
T AXIS: 31 DEGREES
T OFFSET: 406 MS
VENTRICULAR RATE: 73 BPM

## 2025-08-03 ENCOUNTER — HOSPITAL ENCOUNTER (EMERGENCY)
Facility: HOSPITAL | Age: 25
Discharge: LEFT BEFORE DISCHARGE | End: 2025-08-03
Payer: COMMERCIAL

## 2025-08-03 VITALS
HEIGHT: 67 IN | BODY MASS INDEX: 32.11 KG/M2 | DIASTOLIC BLOOD PRESSURE: 94 MMHG | OXYGEN SATURATION: 97 % | SYSTOLIC BLOOD PRESSURE: 153 MMHG | HEART RATE: 88 BPM | RESPIRATION RATE: 20 BRPM | TEMPERATURE: 99.2 F

## 2025-08-03 PROCEDURE — 99281 EMR DPT VST MAYX REQ PHY/QHP: CPT

## 2025-08-03 PROCEDURE — 4500999001 HC ED NO CHARGE

## 2025-08-03 ASSESSMENT — PAIN SCALES - GENERAL: PAINLEVEL_OUTOF10: 6

## 2025-08-03 ASSESSMENT — PAIN - FUNCTIONAL ASSESSMENT: PAIN_FUNCTIONAL_ASSESSMENT: 0-10

## 2025-08-03 ASSESSMENT — PAIN DESCRIPTION - PAIN TYPE: TYPE: ACUTE PAIN

## 2025-08-04 NOTE — ED TRIAGE NOTES
Pt arrives to the ED with c/o left sided abdominal pain, described as possibly gas pain. Pt has had abdominal pain x3 months, seen and treated by PCP, the pain worsened today for the first time. Pt c/o some nausea, no vomiting. Some constipation x4 days, no diarrhea.     Hx of Bipolar I and schizoaffective- compliant with medications. No SI/HI.